# Patient Record
Sex: FEMALE | Race: WHITE | Employment: OTHER | URBAN - METROPOLITAN AREA
[De-identification: names, ages, dates, MRNs, and addresses within clinical notes are randomized per-mention and may not be internally consistent; named-entity substitution may affect disease eponyms.]

---

## 2017-01-12 ENCOUNTER — TRANSFERRED RECORDS (OUTPATIENT)
Dept: HEALTH INFORMATION MANAGEMENT | Facility: CLINIC | Age: 57
End: 2017-01-12

## 2017-06-17 ENCOUNTER — HEALTH MAINTENANCE LETTER (OUTPATIENT)
Age: 57
End: 2017-06-17

## 2018-06-23 ASSESSMENT — ENCOUNTER SYMPTOMS
WEAKNESS: 1
HEARTBURN: 1
CHILLS: 0
DIZZINESS: 0
ARTHRALGIAS: 1
CONSTIPATION: 0
HEMATURIA: 0
FEVER: 0
EYE PAIN: 0
HEADACHES: 0
ABDOMINAL PAIN: 1
NAUSEA: 0
MYALGIAS: 1
HEMATOCHEZIA: 0
DYSURIA: 0
FREQUENCY: 0
SHORTNESS OF BREATH: 0
SORE THROAT: 0
DIARRHEA: 0
NERVOUS/ANXIOUS: 0
JOINT SWELLING: 1
PARESTHESIAS: 0
PALPITATIONS: 0
COUGH: 0

## 2018-06-26 ENCOUNTER — RADIANT APPOINTMENT (OUTPATIENT)
Dept: MAMMOGRAPHY | Facility: CLINIC | Age: 58
End: 2018-06-26
Attending: PEDIATRICS
Payer: COMMERCIAL

## 2018-06-26 ENCOUNTER — OFFICE VISIT (OUTPATIENT)
Dept: PEDIATRICS | Facility: CLINIC | Age: 58
End: 2018-06-26
Payer: COMMERCIAL

## 2018-06-26 VITALS
BODY MASS INDEX: 38.27 KG/M2 | DIASTOLIC BLOOD PRESSURE: 72 MMHG | HEART RATE: 84 BPM | HEIGHT: 65 IN | WEIGHT: 229.7 LBS | TEMPERATURE: 97.6 F | OXYGEN SATURATION: 98 % | SYSTOLIC BLOOD PRESSURE: 103 MMHG

## 2018-06-26 DIAGNOSIS — M25.561 BILATERAL CHRONIC KNEE PAIN: ICD-10-CM

## 2018-06-26 DIAGNOSIS — G89.29 BILATERAL CHRONIC KNEE PAIN: ICD-10-CM

## 2018-06-26 DIAGNOSIS — R13.19 ESOPHAGEAL DYSPHAGIA: ICD-10-CM

## 2018-06-26 DIAGNOSIS — Z86.018 HISTORY OF DYSPLASTIC NEVUS: ICD-10-CM

## 2018-06-26 DIAGNOSIS — Z11.4 SCREENING FOR HIV (HUMAN IMMUNODEFICIENCY VIRUS): ICD-10-CM

## 2018-06-26 DIAGNOSIS — Z83.49 FAMILY HISTORY OF THYROID DISORDER: ICD-10-CM

## 2018-06-26 DIAGNOSIS — Z12.31 VISIT FOR SCREENING MAMMOGRAM: ICD-10-CM

## 2018-06-26 DIAGNOSIS — K44.9 HIATAL HERNIA: ICD-10-CM

## 2018-06-26 DIAGNOSIS — F43.9 STRESS: ICD-10-CM

## 2018-06-26 DIAGNOSIS — Z11.59 NEED FOR HEPATITIS C SCREENING TEST: ICD-10-CM

## 2018-06-26 DIAGNOSIS — Z82.61 FAMILY HISTORY OF RHEUMATOID ARTHRITIS: ICD-10-CM

## 2018-06-26 DIAGNOSIS — B37.2 CANDIDAL INTERTRIGO: ICD-10-CM

## 2018-06-26 DIAGNOSIS — Z83.3 FAMILY HISTORY OF DIABETES MELLITUS: ICD-10-CM

## 2018-06-26 DIAGNOSIS — M25.562 BILATERAL CHRONIC KNEE PAIN: ICD-10-CM

## 2018-06-26 DIAGNOSIS — R10.31 RLQ ABDOMINAL PAIN: ICD-10-CM

## 2018-06-26 DIAGNOSIS — E66.01 MORBID OBESITY (H): ICD-10-CM

## 2018-06-26 DIAGNOSIS — Z00.00 ROUTINE GENERAL MEDICAL EXAMINATION AT A HEALTH CARE FACILITY: Primary | ICD-10-CM

## 2018-06-26 LAB
ERYTHROCYTE [DISTWIDTH] IN BLOOD BY AUTOMATED COUNT: 13.7 % (ref 10–15)
ERYTHROCYTE [SEDIMENTATION RATE] IN BLOOD BY WESTERGREN METHOD: 28 MM/H (ref 0–30)
HBA1C MFR BLD: 5.7 % (ref 0–5.6)
HCT VFR BLD AUTO: 38 % (ref 35–47)
HGB BLD-MCNC: 11.9 G/DL (ref 11.7–15.7)
MCH RBC QN AUTO: 26.9 PG (ref 26.5–33)
MCHC RBC AUTO-ENTMCNC: 31.3 G/DL (ref 31.5–36.5)
MCV RBC AUTO: 86 FL (ref 78–100)
PLATELET # BLD AUTO: 397 10E9/L (ref 150–450)
RBC # BLD AUTO: 4.42 10E12/L (ref 3.8–5.2)
WBC # BLD AUTO: 10.7 10E9/L (ref 4–11)

## 2018-06-26 PROCEDURE — 77063 BREAST TOMOSYNTHESIS BI: CPT | Mod: TC

## 2018-06-26 PROCEDURE — 87389 HIV-1 AG W/HIV-1&-2 AB AG IA: CPT | Performed by: PEDIATRICS

## 2018-06-26 PROCEDURE — 99214 OFFICE O/P EST MOD 30 MIN: CPT | Mod: 25 | Performed by: PEDIATRICS

## 2018-06-26 PROCEDURE — 80061 LIPID PANEL: CPT | Performed by: PEDIATRICS

## 2018-06-26 PROCEDURE — 83036 HEMOGLOBIN GLYCOSYLATED A1C: CPT | Performed by: PEDIATRICS

## 2018-06-26 PROCEDURE — 99396 PREV VISIT EST AGE 40-64: CPT | Performed by: PEDIATRICS

## 2018-06-26 PROCEDURE — 86803 HEPATITIS C AB TEST: CPT | Performed by: PEDIATRICS

## 2018-06-26 PROCEDURE — 85652 RBC SED RATE AUTOMATED: CPT | Performed by: PEDIATRICS

## 2018-06-26 PROCEDURE — 86200 CCP ANTIBODY: CPT | Performed by: PEDIATRICS

## 2018-06-26 PROCEDURE — 36415 COLL VENOUS BLD VENIPUNCTURE: CPT | Performed by: PEDIATRICS

## 2018-06-26 PROCEDURE — 84443 ASSAY THYROID STIM HORMONE: CPT | Performed by: PEDIATRICS

## 2018-06-26 PROCEDURE — 86431 RHEUMATOID FACTOR QUANT: CPT | Performed by: PEDIATRICS

## 2018-06-26 PROCEDURE — 77067 SCR MAMMO BI INCL CAD: CPT | Mod: TC

## 2018-06-26 PROCEDURE — 85027 COMPLETE CBC AUTOMATED: CPT | Performed by: PEDIATRICS

## 2018-06-26 PROCEDURE — 80053 COMPREHEN METABOLIC PANEL: CPT | Performed by: PEDIATRICS

## 2018-06-26 RX ORDER — ECONAZOLE NITRATE 10 MG/G
CREAM TOPICAL DAILY
Qty: 85 G | Refills: 3 | Status: SHIPPED | OUTPATIENT
Start: 2018-06-26 | End: 2024-02-20

## 2018-06-26 RX ORDER — ECONAZOLE NITRATE 10 MG/G
CREAM TOPICAL DAILY
COMMUNITY
End: 2018-06-26

## 2018-06-26 ASSESSMENT — ENCOUNTER SYMPTOMS
JOINT SWELLING: 1
FEVER: 0
SHORTNESS OF BREATH: 0
WEAKNESS: 1
FREQUENCY: 0
MYALGIAS: 1
PARESTHESIAS: 0
SORE THROAT: 0
DYSURIA: 0
HEADACHES: 0
COUGH: 0
HEMATURIA: 0
ARTHRALGIAS: 1
EYE PAIN: 0
HEARTBURN: 1
ABDOMINAL PAIN: 1
NAUSEA: 0
PALPITATIONS: 0
DIARRHEA: 0
DIZZINESS: 0
HEMATOCHEZIA: 0
CONSTIPATION: 0
NERVOUS/ANXIOUS: 0
CHILLS: 0

## 2018-06-26 NOTE — PROGRESS NOTES
SUBJECTIVE:   CC: Berna Lee is an 58 year old woman who presents for preventive health visit.     Physical   Annual:     Getting at least 3 servings of Calcium per day:  NO    Bi-annual eye exam:  Yes    Dental care twice a year:  Yes    Sleep apnea or symptoms of sleep apnea:  Excessive snoring    Diet:  Regular (no restrictions) and Breakfast skipped    Frequency of exercise:  1 day/week    Duration of exercise:  15-30 minutes    Taking medications regularly:  Not Applicable    Additional concerns today:  YES (not getting enough of sleep, pain both knees, hip and shoulder pain, acid reflex, due to xrays from kidney stones, pt claims to have hiatal hernia, refill antifungal cream, pain in overy area, Pt  is concerned about her depression)          Today's PHQ-2 Score:   PHQ-2 ( 1999 [employer]) 6/23/2018   Q1: Little interest or pleasure in doing things 1   Q2: Feeling down, depressed or hopeless 1   PHQ-2 Score 2   Q1: Little interest or pleasure in doing things Several days   Q2: Feeling down, depressed or hopeless Several days   PHQ-2 Score 2       Abuse: Current or Past(Physical, Sexual or Emotional)- No  Do you feel safe in your environment - Yes    Social History   Substance Use Topics     Smoking status: Passive Smoke Exposure - Never Smoker     Smokeless tobacco: Never Used      Comment: Live with a smoker     Alcohol use 0.0 oz/week      Comment: occasional , social     Alcohol Use 6/23/2018   If you drink alcohol do you typically have greater than 3 drinks per day OR greater than 7 drinks per week? No   No flowsheet data found.    Reviewed orders with patient.  Reviewed health maintenance and updated orders accordingly - Yes    Patient over age 50, mutual decision to screen reflected in health maintenance.    Pertinent mammograms are reviewed under the imaging tab.  History of abnormal Pap smear: Status post benign hysterectomy. Health Maintenance and Surgical History updated.     Reviewed  and updated as needed this visit by clinical staff  Tobacco  Allergies  Meds  Med Hx  Surg Hx  Fam Hx  Soc Hx        Reviewed and updated as needed this visit by Provider    Concerns today:    Mood -  worried about depression - has been a very difficult year with taking care of her son who lives in [place] - he had been diagnosed with a brain tumor, then diagnosis changed to sarcoidosis.  Now, she is busy with planning her son's wedding - he is getting  in August.  She has had lower mood and increased stress, but denies thoughts of self harm and does not feel that she needs a medication.  Chronic pain has also caused her great frustrating and limited her ability to be active, compounding her mood symptoms.   Previously had good results with seeing a counselor after her  had a job change.    Knees started hurting in April - started as muscle pain - persistently painful and worsening since that time in the entire joint and behind the joint.  Left knee hurts more, but right knee also painful.  Gets some knee swelling.  Left ankle swells since she broke it.  Pain extends to ankle on the left side.   She has been limiting her activity due to pain and weight is climbing.   Strong family hx of RA and she is nervous about her current symptoms representing the onset of this issue.  Previously followed by [facility] for her ankle injury and she knows that she has had knee imaging performed there in the past.    Acid reflux - occasionally at night - worse if eats too late.  Hiatal hernia noted on past imaging.  Sometimes with eating food gets stuck and then is really painful.  This has been happening more often.  Food will get stuck in the esophagus with pain right under her sternum.  No blood in stools or black tarry stools.  Using TUMS intermittently.    Pain in ovary area - right side - had pain there with ovulation while she was menstruating and now pain comes on and lasts for several  "days. This has been happening chronically - worsening over time.  Denies associated abdominal bloating.    Hysterectomy when 36    Sweaty summer - yeast of infections of skin under breasts.   Has used several different products for this in the past and notes that econazole cream works the best.               Review of Systems   Constitutional: Negative for chills and fever.   HENT: Negative for congestion, ear pain, hearing loss and sore throat.    Eyes: Negative for pain and visual disturbance.   Respiratory: Negative for cough and shortness of breath.    Cardiovascular: Negative for chest pain, palpitations and peripheral edema.   Gastrointestinal: Positive for abdominal pain and heartburn. Negative for constipation, diarrhea, hematochezia and nausea.   Genitourinary: Negative for dysuria, frequency, genital sores, hematuria, pelvic pain, urgency, vaginal bleeding and vaginal discharge.   Musculoskeletal: Positive for arthralgias, joint swelling and myalgias.   Skin: Negative for rash.   Neurological: Positive for weakness. Negative for dizziness, headaches and paresthesias.   Psychiatric/Behavioral: Positive for mood changes. The patient is not nervous/anxious.           OBJECTIVE:   /72 (BP Location: Right arm, Patient Position: Chair, Cuff Size: Adult Large)  Pulse 84  Temp 97.6  F (36.4  C) (Tympanic)  Ht 5' 5\" (1.651 m)  Wt 229 lb 11.2 oz (104.2 kg)  SpO2 98%  BMI 38.22 kg/m2  Physical Exam  GENERAL: healthy, alert and no distress  EYES: Eyes grossly normal to inspection, PERRL and conjunctivae and sclerae normal  HENT: ear canals and TM's normal, nose and mouth without ulcers or lesions  NECK: no adenopathy, no asymmetry, masses, or scars and thyroid normal to palpation  RESP: lungs clear to auscultation - no rales, rhonchi or wheezes  BREAST: normal without masses, tenderness or nipple discharge and no palpable axillary masses or adenopathy  CV: regular rate and rhythm, normal S1 S2, no S3 or " S4, no murmur, click or rub, no peripheral edema and peripheral pulses strong  ABDOMEN: soft, nontender, no hepatosplenomegaly, no masses and bowel sounds normal  MS: bilateral knees with limited flexion, tenderness to palpation in left knee posterior to joint and along lateral joint line, no ballotable effusion present  SKIN: confluent erythematous patches with satellite lesions under breasts bilaterally - no open areas or breaks in skin  NEURO: Normal strength and tone, mentation intact and speech normal  PSYCH: mentation appears normal, affect normal/bright    Diagnostic Test Results:  pending    ASSESSMENT/PLAN:       ICD-10-CM    1. Routine general medical examination at a health care facility Z00.00 Hemoglobin A1c     TSH with free T4 reflex     Lipid panel reflex to direct LDL Fasting     Comprehensive metabolic panel   2. Bilateral chronic knee pain M25.561 ORTHOPEDICS ADULT REFERRAL    M25.562 Erythrocyte sedimentation rate auto    G89.29 Rheumatoid factor     Cyclic Citrullinated Peptide Antibody IgG     CBC with platelets    Recommended orthopedics evaluation promptly, additional lab work today to evaluate for underlying rheumatologic etiology, though most concerned that symptoms represent advanced OA   3. Esophageal dysphagia R13.10 GASTROENTEROLOGY ADULT REF PROCEDURE ONLY [facility]  [phone]; [facility]    Persistent episodes of food getting stuck in esophagus - needs endoscopy, possible esophageal stricture   4. Morbid obesity (H) E66.01 Hemoglobin A1c     TSH with free T4 reflex     Lipid panel reflex to direct LDL Fasting     Comprehensive metabolic panel    Discussed recommendations for lifestyle change   5. Hiatal hernia K44.9 GASTROENTEROLOGY ADULT REF PROCEDURE ONLY [facility]  [phone]; [facility]   6. RLQ abdominal pain R10.31 US Pelvic Complete with Transvaginal    Worsening of RLQ pain over time - recommended imaging to evaluate for ovarian abnormality   7. Candidal  "intertrigo B37.2 econazole nitrate 1 % cream   8. Family history of thyroid disorder Z83.49 TSH with free T4 reflex   9. Family history of rheumatoid arthritis Z82.61 Erythrocyte sedimentation rate auto     Rheumatoid factor     Cyclic Citrullinated Peptide Antibody IgG     CBC with platelets   10. Screening for HIV (human immunodeficiency virus) Z11.4 HIV Screening   11. History of dysplastic nevus Z86.018    12. Need for hepatitis C screening test Z11.59 **Hepatitis C Screen Reflex to RNA FUTURE anytime   13. Family history of diabetes mellitus Z83.3 Hemoglobin A1c   14. Visit for screening mammogram Z12.31 MA Screening Digital Bilateral   15.     Stress - F43.9 - discussed mood concerns at length - plan for patient to call to schedule follow up with a counselor who has been helpful for her in the past - patient feels comfortable watching these symptoms and working on self care/counseling for now - will return to see me if mood is worsening over time.    COUNSELING:  Special attention given to:        Regular exercise       Healthy diet/nutrition       Vision screening       Osteoporosis Prevention/Bone Health       Consider Hep C screening for patients born between 1945 and 1965       HIV screeninx in teen years, 1x in adult years, and at intervals if high risk    BP Readings from Last 1 Encounters:   18 103/72     Estimated body mass index is 38.22 kg/(m^2) as calculated from the following:    Height as of this encounter: 5' 5\" (1.651 m).    Weight as of this encounter: 229 lb 11.2 oz (104.2 kg).      Weight management plan: Discussed healthy diet and exercise guidelines and patient will follow up in 12 months in clinic to re-evaluate.     reports that she is a non-smoker but has been exposed to tobacco smoke. She has never used smokeless tobacco.      Counseling Resources:  ATP IV Guidelines  Pooled Cohorts Equation Calculator  Breast Cancer Risk Calculator  FRAX Risk Assessment  ICSI Preventive " Guidelines  Dietary Guidelines for Americans, 2010  USDA's MyPlate  ASA Prophylaxis  Lung CA Screening    Bing Fung MD  [facility]

## 2018-06-26 NOTE — PATIENT INSTRUCTIONS
Call for visit with [facility]    Expect a phone call to schedule an upper endoscopy    [facility] [phone] - call to set up a visit for a pelvic ultrasound    Lab work today    Antifungal cream refilled    Call for visit with your past counselor - let me know if you need orders.  Keep me posted about your mood and come back in if worsening please    Schedule your mammogram - orders are in - # to schedule [phone]              Preventive Health Recommendations  Female Ages 50 - 64    Yearly exam: See your health care provider every year in order to  o Review health changes.   o Discuss preventive care.    o Review your medicines if your doctor has prescribed any.      Get a Pap test every three years (unless you have an abnormal result and your provider advises testing more often).    If you get Pap tests with HPV test, you only need to test every 5 years, unless you have an abnormal result.     You do not need a Pap test if your uterus was removed (hysterectomy) and you have not had cancer.    You should be tested each year for STDs (sexually transmitted diseases) if you're at risk.     Have a mammogram every 1 to 2 years.    Have a colonoscopy at age 50, or have a yearly FIT test (stool test). These exams screen for colon cancer.      Have a cholesterol test every 5 years, or more often if advised.    Have a diabetes test (fasting glucose) every three years. If you are at risk for diabetes, you should have this test more often.     If you are at risk for osteoporosis (brittle bone disease), think about having a bone density scan (DEXA).    Shots: Get a flu shot each year. Get a tetanus shot every 10 years.    Nutrition:     Eat at least 5 servings of fruits and vegetables each day.    Eat whole-grain bread, whole-wheat pasta and brown rice instead of white grains and rice.    Get adequate Calcium and Vitamin D.     Lifestyle    Exercise at least 150 minutes a week (30  minutes a day, 5 days a week). This will help you control your weight and prevent disease.    Limit alcohol to one drink per day.    No smoking.     Wear sunscreen to prevent skin cancer.     See your dentist every six months for an exam and cleaning.    See your eye doctor every 1 to 2 years.

## 2018-06-26 NOTE — MR AVS SNAPSHOT
After Visit Summary   2018    Berna Lee    MRN: [id number]           Patient Information     Date Of Birth          1960        Visit Information        Provider Department      2018 10:40 AM Bing Fung MD [facility]        Today's Diagnoses     Routine general medical examination at a health care facility    -  1    Family history of thyroid disorder        Family history of diabetes mellitus        Visit for screening mammogram        Need for hepatitis C screening test        History of dysplastic nevus        Screening for HIV (human immunodeficiency virus)        Bilateral chronic knee pain        Esophageal dysphagia        Hiatal hernia        RLQ abdominal pain        Candidal intertrigo        Family history of rheumatoid arthritis          Care Instructions    Call for visit with [facility]    Expect a phone call to schedule an upper endoscopy    [facility] [phone] - call to set up a visit for a pelvic ultrasound    Lab work today    Antifungal cream refilled    Call for visit with your past counselor - let me know if you need orders.  Keep me posted about your mood and come back in if worsening please    Schedule your mammogram - orders are in - # to schedule [phone]              Preventive Health Recommendations  Female Ages 50 - 64    Yearly exam: See your health care provider every year in order to  o Review health changes.   o Discuss preventive care.    o Review your medicines if your doctor has prescribed any.      Get a Pap test every three years (unless you have an abnormal result and your provider advises testing more often).    If you get Pap tests with HPV test, you only need to test every 5 years, unless you have an abnormal result.     You do not need a Pap test if your uterus was removed (hysterectomy) and you have not had cancer.    You should be tested each year for STDs (sexually  transmitted diseases) if you're at risk.     Have a mammogram every 1 to 2 years.    Have a colonoscopy at age 50, or have a yearly FIT test (stool test). These exams screen for colon cancer.      Have a cholesterol test every 5 years, or more often if advised.    Have a diabetes test (fasting glucose) every three years. If you are at risk for diabetes, you should have this test more often.     If you are at risk for osteoporosis (brittle bone disease), think about having a bone density scan (DEXA).    Shots: Get a flu shot each year. Get a tetanus shot every 10 years.    Nutrition:     Eat at least 5 servings of fruits and vegetables each day.    Eat whole-grain bread, whole-wheat pasta and brown rice instead of white grains and rice.    Get adequate Calcium and Vitamin D.     Lifestyle    Exercise at least 150 minutes a week (30 minutes a day, 5 days a week). This will help you control your weight and prevent disease.    Limit alcohol to one drink per day.    No smoking.     Wear sunscreen to prevent skin cancer.     See your dentist every six months for an exam and cleaning.    See your eye doctor every 1 to 2 years.            Follow-ups after your visit        Additional Services     GASTROENTEROLOGY ADULT REF PROCEDURE ONLY [facility]ay [phone]; [facility]       Last Lab Result: Creatinine (mg/dL)       Date                     Value                 08/24/2015               0.71             ----------  Body mass index is 38.22 kg/(m^2).      Patient will be contacted to schedule procedure.     Please be aware that coverage of these services is subject to the terms and limitations of your health insurance plan.  Call member services at your health plan with any benefit or coverage questions.  Any procedures must be performed at a [place] facility OR coordinated by your clinic's referral office.    Please bring the following with you to your appointment:    (1) Any X-Rays, CTs or MRIs which have been  performed.  Contact the facility where they were done to arrange for  prior to your scheduled appointment.    (2) List of current medications   (3) This referral request   (4) Any documents/labs given to you for this referral            ORTHOPEDICS ADULT REFERRAL       Your provider has referred you to: [facility].      Please be aware that coverage of these services is subject to the terms and limitations of your health insurance plan.  Call member services at your health plan with any benefit or coverage questions.      Please bring the following to your appointment:    >>   Any x-rays, CTs or MRIs which have been performed.  Contact the facility where they were done to arrange for  prior to your scheduled appointment.    >>   List of current medications   >>   This referral request   >>   Any documents/labs given to you for this referral                  Future tests that were ordered for you today     Open Future Orders        Priority Expected Expires Ordered    US Pelvic Complete with Transvaginal Routine  6/26/2019 6/26/2018    *MA Screening Digital Bilateral Routine  6/26/2019 6/26/2018            Who to contact     If you have questions or need follow up information about today's clinic visit or your schedule please contact [facility] directly at [phone].  Normal or non-critical lab and imaging results will be communicated to you by MyChart, letter or phone within 4 business days after the clinic has received the results. If you do not hear from us within 7 days, please contact the clinic through MyChart or phone. If you have a critical or abnormal lab result, we will notify you by phone as soon as possible.  Submit refill requests through [employer] or call your pharmacy and they will forward the refill request to us. Please allow 3 business days for your refill to be completed.          Additional Information About Your Visit        MyChart Information     [employer] gives  "you secure access to your electronic health record. If you see a primary care provider, you can also send messages to your care team and make appointments. If you have questions, please call your primary care clinic.  If you do not have a primary care provider, please call [phone] and they will assist you.        Care EveryWhere ID     This is your Care EveryWhere ID. This could be used by other organizations to access your [facility] medical records  [id number]        Your Vitals Were     Pulse Temperature Height Pulse Oximetry BMI (Body Mass Index)       84 97.6  F (36.4  C) (Tympanic) 5' 5\" (1.651 m) 98% 38.22 kg/m2        Blood Pressure from Last 3 Encounters:   06/26/18 103/72   08/21/16 124/83   02/04/16 100/80    Weight from Last 3 Encounters:   06/26/18 229 lb 11.2 oz (104.2 kg)   08/21/16 230 lb (104.3 kg)   02/04/16 217 lb (98.4 kg)              We Performed the Following     **Hepatitis C Screen Reflex to RNA FUTURE anytime     CBC with platelets     Comprehensive metabolic panel     Cyclic Citrullinated Peptide Antibody IgG     Erythrocyte sedimentation rate auto     GASTROENTEROLOGY ADULT REF PROCEDURE ONLY [facility]  [phone]; [facility]     Hemoglobin A1c     HIV Screening     Lipid panel reflex to direct LDL Fasting     ORTHOPEDICS ADULT REFERRAL     Rheumatoid factor     TSH with free T4 reflex          Where to get your medicines      These medications were sent to [id number] - [place]SE [place]EL GUTIERRE[place] - [address]  [address], [place] [address]     Phone:  [phone]     econazole nitrate 1 % cream          Primary Care Provider Office Phone # Fax #    Bing Fung [phone] [address] [place] [address]        Equal Access to Services     PRIMITIVO CERVANTES AH: Kvng talberto Lucia, waaxda luqadaha, qaybta kaalmada mary, juliano recinos ah. So wac " [phone].    ATENCIÓN: Si kaylee arriaza, tiene a perez disposición servicios gratuitos de asistencia lingüística. Jeet al [phone].    We comply with applicable federal civil rights laws and [place] laws. We do not discriminate on the basis of race, color, national origin, age, disability, sex, sexual orientation, or gender identity.            Thank you!     Thank you for choosing [facility] [place]  for your care. Our goal is always to provide you with excellent care. Hearing back from our patients is one way we can continue to improve our services. Please take a few minutes to complete the written survey that you may receive in the mail after your visit with us. Thank you!             Your Updated Medication List - Protect others around you: Learn how to safely use, store and throw away your medicines at www.disposemymeds.org.          This list is accurate as of 6/26/18 11:38 AM.  Always use your most recent med list.                   Brand Name Dispense Instructions for use Diagnosis    ALLEGRA ALLERGY PO      Take 180 mg by mouth daily        BREATHE RIGHT           econazole nitrate 1 % cream     85 g    Apply topically daily    Candidal intertrigo       MUCINEX DM PO      Take by mouth At Bedtime        PROBIOTIC PRODUCT PO      Take by mouth daily

## 2018-06-27 LAB
ALBUMIN SERPL-MCNC: 3.7 G/DL (ref 3.4–5)
ALP SERPL-CCNC: 117 U/L (ref 40–150)
ALT SERPL W P-5'-P-CCNC: 16 U/L (ref 0–50)
ANION GAP SERPL CALCULATED.3IONS-SCNC: 8 MMOL/L (ref 3–14)
AST SERPL W P-5'-P-CCNC: 11 U/L (ref 0–45)
BILIRUB SERPL-MCNC: 0.4 MG/DL (ref 0.2–1.3)
BUN SERPL-MCNC: 21 MG/DL (ref 7–30)
CALCIUM SERPL-MCNC: 9.1 MG/DL (ref 8.5–10.1)
CHLORIDE SERPL-SCNC: 105 MMOL/L (ref 94–109)
CHOLEST SERPL-MCNC: 214 MG/DL
CO2 SERPL-SCNC: 26 MMOL/L (ref 20–32)
CREAT SERPL-MCNC: 0.68 MG/DL (ref 0.52–1.04)
GFR SERPL CREATININE-BSD FRML MDRD: 88 ML/MIN/1.7M2
GLUCOSE SERPL-MCNC: 98 MG/DL (ref 70–99)
HCV AB SERPL QL IA: NONREACTIVE
HDLC SERPL-MCNC: 84 MG/DL
HIV 1+2 AB+HIV1 P24 AG SERPL QL IA: NONREACTIVE
LDLC SERPL CALC-MCNC: 109 MG/DL
NONHDLC SERPL-MCNC: 130 MG/DL
POTASSIUM SERPL-SCNC: 3.9 MMOL/L (ref 3.4–5.3)
PROT SERPL-MCNC: 7.4 G/DL (ref 6.8–8.8)
RHEUMATOID FACT SER NEPH-ACNC: <20 IU/ML (ref 0–20)
SODIUM SERPL-SCNC: 139 MMOL/L (ref 133–144)
TRIGL SERPL-MCNC: 105 MG/DL
TSH SERPL DL<=0.005 MIU/L-ACNC: 2.03 MU/L (ref 0.4–4)

## 2018-06-28 LAB — CCP AB SER IA-ACNC: 1 U/ML

## 2018-07-09 ENCOUNTER — TRANSFERRED RECORDS (OUTPATIENT)
Dept: HEALTH INFORMATION MANAGEMENT | Facility: CLINIC | Age: 58
End: 2018-07-09

## 2019-06-09 ENCOUNTER — APPOINTMENT (OUTPATIENT)
Dept: CT IMAGING | Facility: CLINIC | Age: 59
End: 2019-06-09
Payer: COMMERCIAL

## 2019-06-09 ENCOUNTER — HOSPITAL ENCOUNTER (EMERGENCY)
Facility: CLINIC | Age: 59
Discharge: HOME | End: 2019-06-09
Admitting: PHYSICIAN ASSISTANT
Payer: COMMERCIAL

## 2019-06-09 VITALS
HEART RATE: 76 BPM | SYSTOLIC BLOOD PRESSURE: 134 MMHG | RESPIRATION RATE: 20 BRPM | DIASTOLIC BLOOD PRESSURE: 78 MMHG | TEMPERATURE: 96.9 F | OXYGEN SATURATION: 98 %

## 2019-06-09 DIAGNOSIS — N20.1 URETEROLITHIASIS: ICD-10-CM

## 2019-06-09 DIAGNOSIS — N13.30 HYDRONEPHROSIS: ICD-10-CM

## 2019-06-09 DIAGNOSIS — K44.9 HIATAL HERNIA: ICD-10-CM

## 2019-06-09 LAB
ALBUMIN SERPL-MCNC: 3.5 G/DL (ref 3.4–5)
ALBUMIN UR-MCNC: 30 MG/DL
ALP SERPL-CCNC: 119 U/L (ref 40–150)
ALT SERPL W P-5'-P-CCNC: 20 U/L (ref 0–50)
ANION GAP SERPL CALCULATED.3IONS-SCNC: 5 MMOL/L (ref 3–14)
APPEARANCE UR: ABNORMAL
AST SERPL W P-5'-P-CCNC: 14 U/L (ref 0–45)
BASOPHILS # BLD AUTO: 0.1 10E9/L (ref 0–0.2)
BASOPHILS NFR BLD AUTO: 0.7 %
BILIRUB SERPL-MCNC: 0.3 MG/DL (ref 0.2–1.3)
BILIRUB UR QL STRIP: NEGATIVE
BUN SERPL-MCNC: 17 MG/DL (ref 7–30)
CALCIUM SERPL-MCNC: 8.5 MG/DL (ref 8.5–10.1)
CHLORIDE SERPL-SCNC: 109 MMOL/L (ref 94–109)
CO2 SERPL-SCNC: 28 MMOL/L (ref 20–32)
COLOR UR AUTO: YELLOW
CREAT SERPL-MCNC: 0.86 MG/DL (ref 0.52–1.04)
DIFFERENTIAL METHOD BLD: ABNORMAL
EOSINOPHIL # BLD AUTO: 0.3 10E9/L (ref 0–0.7)
EOSINOPHIL NFR BLD AUTO: 2.3 %
ERYTHROCYTE [DISTWIDTH] IN BLOOD BY AUTOMATED COUNT: 16.2 % (ref 10–15)
GFR SERPL CREATININE-BSD FRML MDRD: 73 ML/MIN/{1.73_M2}
GLUCOSE SERPL-MCNC: 126 MG/DL (ref 70–99)
GLUCOSE UR STRIP-MCNC: NEGATIVE MG/DL
HCT VFR BLD AUTO: 32.8 % (ref 35–47)
HGB BLD-MCNC: 9.5 G/DL (ref 11.7–15.7)
HGB UR QL STRIP: ABNORMAL
IMM GRANULOCYTES # BLD: 0.1 10E9/L (ref 0–0.4)
IMM GRANULOCYTES NFR BLD: 0.8 %
KETONES UR STRIP-MCNC: NEGATIVE MG/DL
LEUKOCYTE ESTERASE UR QL STRIP: NEGATIVE
LIPASE SERPL-CCNC: 89 U/L (ref 73–393)
LYMPHOCYTES # BLD AUTO: 2 10E9/L (ref 0.8–5.3)
LYMPHOCYTES NFR BLD AUTO: 17.5 %
MCH RBC QN AUTO: 22.9 PG (ref 26.5–33)
MCHC RBC AUTO-ENTMCNC: 29 G/DL (ref 31.5–36.5)
MCV RBC AUTO: 79 FL (ref 78–100)
MONOCYTES # BLD AUTO: 1 10E9/L (ref 0–1.3)
MONOCYTES NFR BLD AUTO: 8.4 %
MUCOUS THREADS #/AREA URNS LPF: PRESENT /LPF
NEUTROPHILS # BLD AUTO: 8.2 10E9/L (ref 1.6–8.3)
NEUTROPHILS NFR BLD AUTO: 70.3 %
NITRATE UR QL: NEGATIVE
NRBC # BLD AUTO: 0 10*3/UL
NRBC BLD AUTO-RTO: 0 /100
PH UR STRIP: 5.5 PH (ref 5–7)
PLATELET # BLD AUTO: 454 10E9/L (ref 150–450)
POTASSIUM SERPL-SCNC: 4.1 MMOL/L (ref 3.4–5.3)
PROT SERPL-MCNC: 7.4 G/DL (ref 6.8–8.8)
RBC # BLD AUTO: 4.15 10E12/L (ref 3.8–5.2)
RBC #/AREA URNS AUTO: >182 /HPF (ref 0–2)
SODIUM SERPL-SCNC: 142 MMOL/L (ref 133–144)
SOURCE: ABNORMAL
SP GR UR STRIP: 1.02 (ref 1–1.03)
SQUAMOUS #/AREA URNS AUTO: 1 /HPF (ref 0–1)
UROBILINOGEN UR STRIP-MCNC: NORMAL MG/DL (ref 0–2)
WBC # BLD AUTO: 11.7 10E9/L (ref 4–11)
WBC #/AREA URNS AUTO: <1 /HPF (ref 0–5)
YEAST #/AREA URNS HPF: ABNORMAL /HPF

## 2019-06-09 PROCEDURE — 83690 ASSAY OF LIPASE: CPT | Performed by: PHYSICIAN ASSISTANT

## 2019-06-09 PROCEDURE — 85025 COMPLETE CBC W/AUTO DIFF WBC: CPT | Performed by: PHYSICIAN ASSISTANT

## 2019-06-09 PROCEDURE — 96361 HYDRATE IV INFUSION ADD-ON: CPT

## 2019-06-09 PROCEDURE — 99284 EMERGENCY DEPT VISIT MOD MDM: CPT | Mod: 25

## 2019-06-09 PROCEDURE — 81001 URINALYSIS AUTO W/SCOPE: CPT | Performed by: PHYSICIAN ASSISTANT

## 2019-06-09 PROCEDURE — 80053 COMPREHEN METABOLIC PANEL: CPT | Performed by: PHYSICIAN ASSISTANT

## 2019-06-09 PROCEDURE — 96374 THER/PROPH/DIAG INJ IV PUSH: CPT

## 2019-06-09 PROCEDURE — 96375 TX/PRO/DX INJ NEW DRUG ADDON: CPT

## 2019-06-09 PROCEDURE — 25000128 H RX IP 250 OP 636: Performed by: PHYSICIAN ASSISTANT

## 2019-06-09 PROCEDURE — 74176 CT ABD & PELVIS W/O CONTRAST: CPT

## 2019-06-09 RX ORDER — OXYCODONE HYDROCHLORIDE 5 MG/1
5 TABLET ORAL EVERY 6 HOURS PRN
Qty: 12 TABLET | Refills: 0 | Status: ON HOLD | OUTPATIENT
Start: 2019-06-09 | End: 2019-06-15

## 2019-06-09 RX ORDER — ONDANSETRON 2 MG/ML
4 INJECTION INTRAMUSCULAR; INTRAVENOUS EVERY 30 MIN PRN
Status: DISCONTINUED | OUTPATIENT
Start: 2019-06-09 | End: 2019-06-09 | Stop reason: HOSPADM

## 2019-06-09 RX ORDER — TAMSULOSIN HYDROCHLORIDE 0.4 MG/1
0.4 CAPSULE ORAL DAILY
Qty: 10 CAPSULE | Refills: 0 | Status: ON HOLD | OUTPATIENT
Start: 2019-06-09 | End: 2019-06-15

## 2019-06-09 RX ORDER — KETOROLAC TROMETHAMINE 15 MG/ML
15 INJECTION, SOLUTION INTRAMUSCULAR; INTRAVENOUS ONCE
Status: COMPLETED | OUTPATIENT
Start: 2019-06-09 | End: 2019-06-09

## 2019-06-09 RX ORDER — ONDANSETRON 4 MG/1
4 TABLET, ORALLY DISINTEGRATING ORAL EVERY 8 HOURS PRN
Qty: 10 TABLET | Refills: 0 | Status: ON HOLD | OUTPATIENT
Start: 2019-06-09 | End: 2019-06-15

## 2019-06-09 RX ORDER — SODIUM CHLORIDE 9 MG/ML
1000 INJECTION, SOLUTION INTRAVENOUS CONTINUOUS
Status: DISCONTINUED | OUTPATIENT
Start: 2019-06-09 | End: 2019-06-09 | Stop reason: HOSPADM

## 2019-06-09 RX ADMIN — KETOROLAC TROMETHAMINE 15 MG: 15 INJECTION, SOLUTION INTRAMUSCULAR; INTRAVENOUS at 15:59

## 2019-06-09 RX ADMIN — SODIUM CHLORIDE 1000 ML: 9 INJECTION, SOLUTION INTRAVENOUS at 16:00

## 2019-06-09 RX ADMIN — ONDANSETRON 4 MG: 2 INJECTION INTRAMUSCULAR; INTRAVENOUS at 15:57

## 2019-06-09 ASSESSMENT — ENCOUNTER SYMPTOMS
HEMATURIA: 0
FLANK PAIN: 1
ABDOMINAL PAIN: 1
DIARRHEA: 0
FEVER: 0
NAUSEA: 1
DYSURIA: 0
BLOOD IN STOOL: 0
VOMITING: 1
FREQUENCY: 0

## 2019-06-09 NOTE — ED AVS SNAPSHOT
[facility] Emergency Department  [address]  [facility] [address]  Phone:  [phone]  Fax:  [phone]                                    Berna Lee   MRN: [id number]    Department:  [facility] Emergency Department   Date of Visit:  6/9/2019           After Visit Summary Signature Page    I have received my discharge instructions, and my questions have been answered. I have discussed any challenges I see with this plan with the nurse or doctor.    ..........................................................................................................................................  Patient/Patient Representative Signature      ..........................................................................................................................................  Patient Representative Print Name and Relationship to Patient    ..................................................               ................................................  Date                                   Time    ..........................................................................................................................................  Reviewed by Signature/Title    ...................................................              ..............................................  Date                                               Time          22EPIC Rev 08/18

## 2019-06-09 NOTE — DISCHARGE INSTRUCTIONS
Tylenol and ibuprofen at home for pain control. If pain is not controlled, take oxycodone. Zofran for nausea. Begin taking the flomax. Strain your urine at home. Dr. Samson is the urologist that you have seen in the past, his information is above. Call if not seeing any improvement in 3-5 days. Return for changing or worsening symptoms, uncontrolled nausea or vomiting, fevers, worsening/uncontrolled pain, new concerns.       Discharge Instructions  Kidney Stones    Kidney stones are a common problem that can cause a lot of pain but fortunately are usually not dangerous and can be generally treated with medicine at home.  However, sometimes your condition may be worse than it seemed at first, or may get worse with time.     You need to follow-up with your regular doctor within 3 days.    Most kidney stones will pass on their own, but occasionally stones may need to be removed by an urologist. We will send you home with a urine strainer. Be sure to urinate into this, or urinate into a container and pour the urine through the fine filter to catch the kidney stone as it comes out. The stone will seem like a pebble or grain of sand. Be sure to save this in a Ziploc  bag and take it to the doctor?s office with you.       Return to the Emergency Department if:  Your pain is not controlled.  You are vomiting and can?t keep fluids or medications down.  You develop fever (>101).  You feel much more ill or develop new symptoms.  What can I do to help myself?  Be sure to drink plenty of fluids.  Staying active is good, and may help the stone to pass. You may do whatever you feel up to doing without restrictions.   Treatment:  Non-steroidal anti-inflammatory drugs (NSAIDs). This includes prescription medicines like Toradol  (ketorolac) and non-prescription medicines like Advil  (ibuprofen) and Nuprin  (ibuprofen). These pain relievers are very effective for kidney stones.  Narcotic pain pills. If you have been given a narcotic  such as Vicodin  (hydrocodone with acetaminophen), Percocet  (oxycodone with acetaminophen), or codeine, do not drive for four hours after you have taken it. If the narcotic contains Tylenol  (acetaminophen), do not take Tylenol  with it. All narcotics will cause constipation, so eat a high fiber diet.    Nausea medication.  Nausea and vomiting are common with kidney stones, so your physician may send you home with medicine for this.   Flomax  (tamsulosin). This medicine is sometimes used for men with prostate problems, but also can help kidney stones to pass. This medicine can lower blood pressure, and you may feel faint, especially when you first stand up. Be sure to get up gradually, sit down if you feel faint, and avoid activity where feeling faint would be dangerous, such as climbing ladders.   If you were given a prescription for medicine here today, be sure to read all of the information (including the package insert) that comes with your prescription.  This will include important information about the medicine, its side effects, and any warnings that you need to know about.  The pharmacist who fills the prescription can provide more information and answer questions you may have about the medicine.  If you have questions or concerns that the pharmacist cannot address, please call or return to the Emergency Department.   Opioid Medication Information    Pain medications are among the most commonly prescribed medicines, so we are including this information for all our patients. If you did not receive pain medication or get a prescription for pain medicine, you can ignore it.     You may have been given a prescription for an opioid (narcotic) pain medicine and/or have received a pain medicine while here in the Emergency Department. These medicines can make you drowsy or impaired. You must not drive, operate dangerous equipment, or engage in any other dangerous activities while taking these medications. If you  drive while taking these medications, you could be arrested for DUI, or driving under the influence. Do not drink any alcohol while you are taking these medications.     Opioid pain medications can cause addiction. If you have a history of chemical dependency of any type, you are at a higher risk of becoming addicted to pain medications.  Only take these prescribed medications to treat your pain when all other options have been tried. Take it for as short a time and as few doses as possible. Store your pain pills in a secure place, as they are frequently stolen and provide a dangerous opportunity for children or visitors in your house to start abusing these powerful medications. We will not replace any lost or stolen medicine.  As soon as your pain is better, you should flush all your remaining medication.     Many prescription pain medications contain Tylenol  (acetaminophen), including Vicodin , Tylenol #3 , Norco , Lortab , and Percocet .  You should not take any extra pills of Tylenol  if you are using these prescription medications or you can get very sick.  Do not ever take more than 3000 mg of acetaminophen in any 24 hour period.    All opioids tend to cause constipation. Drink plenty of water and eat foods that have a lot of fiber, such as fruits, vegetables, prune juice, apple juice and high fiber cereal.  Take a laxative if you don?t move your bowels at least every other day. Miralax , Milk of Magnesia, Colace , or Senna  can be used to keep you regular.      Remember that you can always come back to the Emergency Department if you are not able to see your regular doctor in the amount of time listed above, if you get any new symptoms, or if there is anything that worries you.

## 2019-06-09 NOTE — ED PROVIDER NOTES
History     Chief Complaint:  Flank Pain and Abdominal Pain    HPI   Berna Lee is a 59 year old female with a history of kidney stones and lithotripsy who presents to the emergency department today with flank pain and abdominal pain. Patient woke up at 0730 this morning with left flank pain that radiates to the left abdomen. She went back to bed until 1330 (2 hours ago), at which point she woke up with associated nausea and vomiting. The patient usually gets vomiting with her kidney stones. Patient rates her pain as 8/10 and states this is greatly increased from her previous kidney stone pain. She took Zofran, but was not able to keep it down. Patient denies diarrhea, fever, urinary changes, hematuria, blood in her stools.  She does have an urologist, Dr. Samson, who was performed lithotripsy for her stones in the past.     Allergies:  Augmentin  Codeine Sulfate  Ivp Dye [Contrast Dye]  Sulfa Drugs  Tetracyclines    Medications:    Mucinex  Allegra allergy     Past Medical History:    Allergic state   Depressive disorder   Blood transfusion   Dysplastic nevus  Kidney stones   Nephrolithiasis  Obesity  PONV (postoperative nausea and vomiting)     Past Surgical History:    Abdomen Surgery   Biopsy   Colonoscopy   Combined Cystoscopy, Retrogrades, Ureteroscopy, Laser Holmium Lithotripsy Ureter(S), Insert Stent   ENT Surgery   Genitourinary Surgery   [place] Tooth Surgery   Hysterectomy, Pap No Longer Indicated   Orthopedic Surgery   Soft Tissue Surgery     Family History:    Thyroid disorder      Rheumatiod Arthritis/HypoThyroid  Anxiety Disorder  Arthritis  Cerebrovascular Disease  Coronary Artery Disease  Depression  Diabetes  Gastrointestinal Disease  Genitourinary Problems  Hyperlipidemia  Hypertension  Lipids  Neurologic Disorder  Obesity  Osteoporosis  Thyroid Disease    Social History:  The patient was accompanied to the ED by .  Smoking Status: Passive smoke exposure, never smoker  Smokeless  Tobacco: Never  Alcohol Use: Yes   Marital Status:       Review of Systems   Constitutional: Negative for fever.   Gastrointestinal: Positive for abdominal pain (left), nausea and vomiting. Negative for blood in stool and diarrhea.   Genitourinary: Positive for flank pain (left). Negative for dysuria, frequency and hematuria.   All other systems reviewed and are negative.    Physical Exam     Patient Vitals for the past 24 hrs:   BP Temp Temp src Pulse Resp SpO2   06/09/19 1630 160/74 -- -- 76 -- 99 %   06/09/19 1600 168/86 -- -- 79 -- 98 %   06/09/19 1532 (!) 199/113 96.9  F (36.1  C) Temporal 97 20 99 %      Physical Exam  General: Resting in chair with occasional emesis, appears uncomfortable. Mildly diaphoretic.   Skin: Good turgor, no rash, no unusual bruising or prominent lesions.  HEENT: Head: Normocephalic, atraumatic, no visible masses.   Eyes: Conjunctiva clear.  Cardiac: Normal rate and regular rhythm, no murmur or gallop.   Lungs: Clear to auscultation.  Abdomen: Left CVA tenderness.   Musculoskeletal: Normal gait and station. No calf tenderness or swelling.   Neurologic: Oriented x 3. GCS: 15.  Psychiatric: Intact recent and remote memory, judgment and insight, normal mood and affect.    Emergency Department Course   Imaging:  Radiology findings were communicated with the patient and family who voiced understanding of the findings.  CT Abdomen Pelvis w/o Contrast   Final Result   IMPRESSION:      1. 5 mm stone in the proximal left ureter with mild left   hydronephrosis.   2. Additional nonobstructing 8 mm stone in the upper left kidney.   3. Moderate to large hiatal hernia.      ZO RICHARD MD      Report per radiology      Laboratory:  Laboratory findings were communicated with the patient and family who voiced understanding of the findings.  CBC: AWNL (WBC 11.7, HGB 9.5, )   CMP: 126 Glucose o/w WNL (Creatinine 0.86)   Lipase: 89   UA: slightly cloudy, yellow urine with large blood, 30  protein albumin, >182 RBC, many yeast, mucous present o/w WNL     Interventions:  1557: Zofran 4mg IV   1559: Toradol 15mg IV injection  1600:  0.9% NaCl 1L IV Bolus     Emergency Department Course:  Nursing notes and vitals reviewed.  1521: I performed an exam of the patient as documented above.   IV was inserted and blood was drawn for laboratory testing, results above.  The patient provided a urine sample here in the emergency department. This was sent for laboratory testing, findings above.  The patient was sent for a CT Abdomen Pelvis while in the emergency department, results above.   1638: Patient rechecked and updated.    1646: Findings and plan explained to the Patient and spouse. Patient discharged home with instructions regarding supportive care, medications, and reasons to return. The importance of close follow-up was reviewed. The patient was prescribed Zofran, Roxicodone, and Flomax.    I personally reviewed the laboratory and imaging results with the Patient and spouse and answered all related questions prior to discharge.     Impression & Plan    Medical Decision Making:  Berna Lee is a 59 year old female who presented with unilateral flank and abdominal pain consistent with renal colic. CT confirms a ureteral stone.  Obtaining CT was discussed to the patient kidney stones.  However, due to her significant pain, and ill appearance, it was pain is controlled with interventions in the Emergency Department. There is no fever or evidence of a urinary tract infection.  The patient will be discharged with opioid analgesics and Ibuprofen for pain. Side effects of narcotic medications were discussed. Flomax will be prescribed daily to attempt to ease stone passage.  Other etiologies for these symptoms such as AAA, pyelonephritis, muscular strain, appendicitis, amongst others are considered and have been excluded.  She also had a hiatal hernia on CT, patient has known of this for a great deal of  time.  She will follow with urology if not improving in the next week.  The patient was told to return if if increasing pain not controlled with pain meds, vomiting, and fever. All questions were answered prior to the patient's discharge. She was in agreement with the plan stated above.     Diagnosis:    ICD-10-CM    1. Ureterolithiasis N20.1    2. Hydronephrosis N13.30    3. Hiatal hernia K44.9        Disposition:  discharged to home    Discharge Medications:     Medication List      Started    ondansetron 4 MG ODT tab  Commonly known as:  ZOFRAN ODT  4 mg, Oral, EVERY 8 HOURS PRN     oxyCODONE 5 MG tablet  Commonly known as:  ROXICODONE  5 mg, Oral, EVERY 6 HOURS PRN     tamsulosin 0.4 MG capsule  Commonly known as:  FLOMAX  0.4 mg, Oral, DAILY            Scribe Disclosure:  SEBASTIEN Ellijillian Bell, am serving as a scribe at 5:00 PM on 6/9/2019 to document services personally performed by Jennifer Hutchins PA-C based on my observations and the provider's statements to me.    6/9/2019   [facility] EMERGENCY DEPARTMENT    This was created at least in part with a voice recognition software. Mistakes/typos may be present.      Jennifer Hutchins PA  06/09/19 [id number]

## 2019-06-09 NOTE — ED TRIAGE NOTES
Left flank pain radiates to left abdominal pain 7 :30 this morning. Nausea and vomiting. Patient states she has a history of kidney stones.   ABC intact alert and no distress.

## 2019-06-14 ENCOUNTER — APPOINTMENT (OUTPATIENT)
Dept: GENERAL RADIOLOGY | Facility: CLINIC | Age: 59
End: 2019-06-14
Attending: EMERGENCY MEDICINE
Payer: COMMERCIAL

## 2019-06-14 ENCOUNTER — APPOINTMENT (OUTPATIENT)
Dept: CT IMAGING | Facility: CLINIC | Age: 59
End: 2019-06-14
Attending: EMERGENCY MEDICINE
Payer: COMMERCIAL

## 2019-06-14 ENCOUNTER — HOSPITAL ENCOUNTER (OUTPATIENT)
Facility: CLINIC | Age: 59
Setting detail: OBSERVATION
Discharge: HOME | End: 2019-06-15
Attending: EMERGENCY MEDICINE | Admitting: INTERNAL MEDICINE
Payer: COMMERCIAL

## 2019-06-14 DIAGNOSIS — Z91.89 AT RISK FOR INADEQUATE PAIN CONTROL: ICD-10-CM

## 2019-06-14 DIAGNOSIS — N23 URETERAL COLIC: ICD-10-CM

## 2019-06-14 PROBLEM — N20.1 URETEROLITHIASIS: Status: ACTIVE | Noted: 2019-06-14

## 2019-06-14 LAB
ALBUMIN UR-MCNC: NEGATIVE MG/DL
ANION GAP SERPL CALCULATED.3IONS-SCNC: 6 MMOL/L (ref 3–14)
APPEARANCE UR: CLEAR
BILIRUB UR QL STRIP: NEGATIVE
BUN SERPL-MCNC: 16 MG/DL (ref 7–30)
CALCIUM SERPL-MCNC: 8.8 MG/DL (ref 8.5–10.1)
CHLORIDE SERPL-SCNC: 104 MMOL/L (ref 94–109)
CO2 SERPL-SCNC: 28 MMOL/L (ref 20–32)
COLOR UR AUTO: ABNORMAL
CREAT SERPL-MCNC: 0.68 MG/DL (ref 0.52–1.04)
ERYTHROCYTE [DISTWIDTH] IN BLOOD BY AUTOMATED COUNT: 16 % (ref 10–15)
GFR SERPL CREATININE-BSD FRML MDRD: >90 ML/MIN/{1.73_M2}
GLUCOSE SERPL-MCNC: 121 MG/DL (ref 70–99)
GLUCOSE UR STRIP-MCNC: NEGATIVE MG/DL
HCT VFR BLD AUTO: 31.8 % (ref 35–47)
HGB BLD-MCNC: 9.3 G/DL (ref 11.7–15.7)
HGB UR QL STRIP: NEGATIVE
KETONES UR STRIP-MCNC: ABNORMAL MG/DL
LEUKOCYTE ESTERASE UR QL STRIP: NEGATIVE
MCH RBC QN AUTO: 22.6 PG (ref 26.5–33)
MCHC RBC AUTO-ENTMCNC: 29.2 G/DL (ref 31.5–36.5)
MCV RBC AUTO: 77 FL (ref 78–100)
MUCOUS THREADS #/AREA URNS LPF: PRESENT /LPF
NITRATE UR QL: NEGATIVE
PH UR STRIP: 7.5 PH (ref 5–7)
PLATELET # BLD AUTO: 450 10E9/L (ref 150–450)
POTASSIUM SERPL-SCNC: 4.3 MMOL/L (ref 3.4–5.3)
RBC # BLD AUTO: 4.11 10E12/L (ref 3.8–5.2)
RBC #/AREA URNS AUTO: 4 /HPF (ref 0–2)
SODIUM SERPL-SCNC: 139 MMOL/L (ref 133–144)
SOURCE: ABNORMAL
SP GR UR STRIP: 1.02 (ref 1–1.03)
SQUAMOUS #/AREA URNS AUTO: 1 /HPF (ref 0–1)
UROBILINOGEN UR STRIP-MCNC: NORMAL MG/DL (ref 0–2)
WBC # BLD AUTO: 13.6 10E9/L (ref 4–11)
WBC #/AREA URNS AUTO: 1 /HPF (ref 0–5)

## 2019-06-14 PROCEDURE — 96361 HYDRATE IV INFUSION ADD-ON: CPT

## 2019-06-14 PROCEDURE — 25800030 ZZH RX IP 258 OP 636: Performed by: INTERNAL MEDICINE

## 2019-06-14 PROCEDURE — 96375 TX/PRO/DX INJ NEW DRUG ADDON: CPT

## 2019-06-14 PROCEDURE — 81001 URINALYSIS AUTO W/SCOPE: CPT | Performed by: EMERGENCY MEDICINE

## 2019-06-14 PROCEDURE — 96376 TX/PRO/DX INJ SAME DRUG ADON: CPT

## 2019-06-14 PROCEDURE — 85027 COMPLETE CBC AUTOMATED: CPT | Performed by: EMERGENCY MEDICINE

## 2019-06-14 PROCEDURE — G0378 HOSPITAL OBSERVATION PER HR: HCPCS

## 2019-06-14 PROCEDURE — 25000132 ZZH RX MED GY IP 250 OP 250 PS 637: Performed by: EMERGENCY MEDICINE

## 2019-06-14 PROCEDURE — 25000128 H RX IP 250 OP 636: Performed by: EMERGENCY MEDICINE

## 2019-06-14 PROCEDURE — 80048 BASIC METABOLIC PNL TOTAL CA: CPT | Performed by: EMERGENCY MEDICINE

## 2019-06-14 PROCEDURE — 96374 THER/PROPH/DIAG INJ IV PUSH: CPT

## 2019-06-14 PROCEDURE — 99219 ZZC INITIAL OBSERVATION CARE,LEVL II: CPT | Performed by: INTERNAL MEDICINE

## 2019-06-14 PROCEDURE — 87086 URINE CULTURE/COLONY COUNT: CPT | Performed by: EMERGENCY MEDICINE

## 2019-06-14 PROCEDURE — 25000132 ZZH RX MED GY IP 250 OP 250 PS 637: Performed by: INTERNAL MEDICINE

## 2019-06-14 PROCEDURE — 99285 EMERGENCY DEPT VISIT HI MDM: CPT | Mod: 25

## 2019-06-14 PROCEDURE — 25000128 H RX IP 250 OP 636: Performed by: INTERNAL MEDICINE

## 2019-06-14 PROCEDURE — 74176 CT ABD & PELVIS W/O CONTRAST: CPT

## 2019-06-14 PROCEDURE — 74019 RADEX ABDOMEN 2 VIEWS: CPT

## 2019-06-14 RX ORDER — NALOXONE HYDROCHLORIDE 0.4 MG/ML
.1-.4 INJECTION, SOLUTION INTRAMUSCULAR; INTRAVENOUS; SUBCUTANEOUS
Status: DISCONTINUED | OUTPATIENT
Start: 2019-06-14 | End: 2019-06-15 | Stop reason: HOSPADM

## 2019-06-14 RX ORDER — LORAZEPAM 2 MG/ML
0.5 INJECTION INTRAMUSCULAR EVERY 4 HOURS PRN
Status: DISCONTINUED | OUTPATIENT
Start: 2019-06-14 | End: 2019-06-15 | Stop reason: HOSPADM

## 2019-06-14 RX ORDER — OXYCODONE HYDROCHLORIDE 5 MG/1
5 TABLET ORAL ONCE
Status: COMPLETED | OUTPATIENT
Start: 2019-06-14 | End: 2019-06-14

## 2019-06-14 RX ORDER — AMOXICILLIN 250 MG
1 CAPSULE ORAL 2 TIMES DAILY
Status: DISCONTINUED | OUTPATIENT
Start: 2019-06-14 | End: 2019-06-15 | Stop reason: HOSPADM

## 2019-06-14 RX ORDER — BISACODYL 10 MG
10 SUPPOSITORY, RECTAL RECTAL DAILY PRN
Status: DISCONTINUED | OUTPATIENT
Start: 2019-06-14 | End: 2019-06-15 | Stop reason: HOSPADM

## 2019-06-14 RX ORDER — ACETAMINOPHEN 650 MG/1
650 SUPPOSITORY RECTAL EVERY 4 HOURS PRN
Status: DISCONTINUED | OUTPATIENT
Start: 2019-06-14 | End: 2019-06-15 | Stop reason: HOSPADM

## 2019-06-14 RX ORDER — ONDANSETRON 4 MG/1
4 TABLET, ORALLY DISINTEGRATING ORAL EVERY 6 HOURS PRN
Status: DISCONTINUED | OUTPATIENT
Start: 2019-06-14 | End: 2019-06-15 | Stop reason: HOSPADM

## 2019-06-14 RX ORDER — ONDANSETRON 2 MG/ML
4 INJECTION INTRAMUSCULAR; INTRAVENOUS ONCE
Status: COMPLETED | OUTPATIENT
Start: 2019-06-14 | End: 2019-06-14

## 2019-06-14 RX ORDER — ACETAMINOPHEN 325 MG/1
650 TABLET ORAL EVERY 4 HOURS PRN
Status: DISCONTINUED | OUTPATIENT
Start: 2019-06-14 | End: 2019-06-15 | Stop reason: HOSPADM

## 2019-06-14 RX ORDER — HYDROMORPHONE HYDROCHLORIDE 1 MG/ML
0.5 INJECTION, SOLUTION INTRAMUSCULAR; INTRAVENOUS; SUBCUTANEOUS
Status: COMPLETED | OUTPATIENT
Start: 2019-06-14 | End: 2019-06-14

## 2019-06-14 RX ORDER — PROCHLORPERAZINE MALEATE 5 MG
10 TABLET ORAL EVERY 6 HOURS PRN
Status: DISCONTINUED | OUTPATIENT
Start: 2019-06-14 | End: 2019-06-15 | Stop reason: HOSPADM

## 2019-06-14 RX ORDER — KETOROLAC TROMETHAMINE 15 MG/ML
15 INJECTION, SOLUTION INTRAMUSCULAR; INTRAVENOUS ONCE
Status: COMPLETED | OUTPATIENT
Start: 2019-06-14 | End: 2019-06-14

## 2019-06-14 RX ORDER — HYDRALAZINE HYDROCHLORIDE 20 MG/ML
10 INJECTION INTRAMUSCULAR; INTRAVENOUS EVERY 4 HOURS PRN
Status: DISCONTINUED | OUTPATIENT
Start: 2019-06-14 | End: 2019-06-15 | Stop reason: HOSPADM

## 2019-06-14 RX ORDER — POLYETHYLENE GLYCOL 3350 17 G/17G
17 POWDER, FOR SOLUTION ORAL DAILY PRN
Status: DISCONTINUED | OUTPATIENT
Start: 2019-06-14 | End: 2019-06-15 | Stop reason: HOSPADM

## 2019-06-14 RX ORDER — ONDANSETRON 2 MG/ML
4 INJECTION INTRAMUSCULAR; INTRAVENOUS EVERY 6 HOURS PRN
Status: DISCONTINUED | OUTPATIENT
Start: 2019-06-14 | End: 2019-06-15 | Stop reason: HOSPADM

## 2019-06-14 RX ORDER — OXYCODONE HYDROCHLORIDE 5 MG/1
5-10 TABLET ORAL EVERY 4 HOURS PRN
Status: DISCONTINUED | OUTPATIENT
Start: 2019-06-14 | End: 2019-06-15 | Stop reason: HOSPADM

## 2019-06-14 RX ORDER — IBUPROFEN 200 MG
200-400 TABLET ORAL
COMMUNITY
End: 2021-09-02

## 2019-06-14 RX ORDER — AMOXICILLIN 250 MG
2 CAPSULE ORAL 2 TIMES DAILY
Status: DISCONTINUED | OUTPATIENT
Start: 2019-06-14 | End: 2019-06-15 | Stop reason: HOSPADM

## 2019-06-14 RX ORDER — TAMSULOSIN HYDROCHLORIDE 0.4 MG/1
0.4 CAPSULE ORAL DAILY
Status: DISCONTINUED | OUTPATIENT
Start: 2019-06-15 | End: 2019-06-15 | Stop reason: HOSPADM

## 2019-06-14 RX ORDER — SODIUM CHLORIDE 9 MG/ML
INJECTION, SOLUTION INTRAVENOUS CONTINUOUS
Status: DISCONTINUED | OUTPATIENT
Start: 2019-06-14 | End: 2019-06-15 | Stop reason: HOSPADM

## 2019-06-14 RX ORDER — PROCHLORPERAZINE 25 MG
25 SUPPOSITORY, RECTAL RECTAL EVERY 12 HOURS PRN
Status: DISCONTINUED | OUTPATIENT
Start: 2019-06-14 | End: 2019-06-15 | Stop reason: HOSPADM

## 2019-06-14 RX ORDER — MORPHINE SULFATE 2 MG/ML
2 INJECTION, SOLUTION INTRAMUSCULAR; INTRAVENOUS
Status: DISCONTINUED | OUTPATIENT
Start: 2019-06-14 | End: 2019-06-15 | Stop reason: HOSPADM

## 2019-06-14 RX ADMIN — ONDANSETRON 4 MG: 2 INJECTION INTRAMUSCULAR; INTRAVENOUS at 13:51

## 2019-06-14 RX ADMIN — SODIUM CHLORIDE: 9 INJECTION, SOLUTION INTRAVENOUS at 22:36

## 2019-06-14 RX ADMIN — KETOROLAC TROMETHAMINE 15 MG: 15 INJECTION, SOLUTION INTRAMUSCULAR; INTRAVENOUS at 17:50

## 2019-06-14 RX ADMIN — ONDANSETRON 4 MG: 2 INJECTION INTRAMUSCULAR; INTRAVENOUS at 22:08

## 2019-06-14 RX ADMIN — OXYCODONE HYDROCHLORIDE 5 MG: 5 TABLET ORAL at 19:48

## 2019-06-14 RX ADMIN — SODIUM CHLORIDE 1000 ML: 9 INJECTION, SOLUTION INTRAVENOUS at 13:51

## 2019-06-14 RX ADMIN — HYDROMORPHONE HYDROCHLORIDE 0.5 MG: 1 INJECTION, SOLUTION INTRAMUSCULAR; INTRAVENOUS; SUBCUTANEOUS at 15:19

## 2019-06-14 RX ADMIN — OXYCODONE HYDROCHLORIDE 5 MG: 5 TABLET ORAL at 22:01

## 2019-06-14 ASSESSMENT — ACTIVITIES OF DAILY LIVING (ADL)
RETIRED_COMMUNICATION: 0-->UNDERSTANDS/COMMUNICATES WITHOUT DIFFICULTY
DRESS: 0-->INDEPENDENT
TRANSFERRING: 0-->INDEPENDENT
RETIRED_EATING: 0-->INDEPENDENT
FALL_HISTORY_WITHIN_LAST_SIX_MONTHS: NO
TOILETING: 0-->INDEPENDENT
AMBULATION: 0-->INDEPENDENT
COGNITION: 0 - NO COGNITION ISSUES REPORTED
BATHING: 0-->INDEPENDENT
SWALLOWING: 0-->SWALLOWS FOODS/LIQUIDS WITHOUT DIFFICULTY

## 2019-06-14 ASSESSMENT — ENCOUNTER SYMPTOMS
DYSURIA: 0
FEVER: 0
VOMITING: 1
ABDOMINAL PAIN: 1
FLANK PAIN: 1
NAUSEA: 1
BLOOD IN STOOL: 0
DIARRHEA: 0
HEMATURIA: 0
FREQUENCY: 0
CHILLS: 0

## 2019-06-14 ASSESSMENT — MIFFLIN-ST. JEOR: SCORE: 1650.89

## 2019-06-14 NOTE — ED TRIAGE NOTES
ABC's intact.  Alert and oriented x4.    Pt dx with kidney stones 6/9 in this ED.  Pt states pain improved on Tuesday and was completely resolved on Wednesday.  Pt states she could feel pain tracking lower and lower until it resolved.  At 0630 this morning, pain returned high in her L flank.  Took one oxycodone at 0800.  At 0830 hand nausea and episode of vomiting.  Took ODT zofran at 0840.  Nausea improved but continues with significant flank pain.  Pt does not report fevers at home.

## 2019-06-14 NOTE — ED PROVIDER NOTES
History     Chief Complaint:  Flank Pain    HPI   Berna Lee is a 59 year old female with a history of calcium oxalate kidney stones who presents with left flank pain. The patient originally developed left flank pain on Sunday, 5 days ago, at which time she was seen here in the ED by Jennifer Hutchins PA-C who obtained laboratory studies and CT abdomen pelvis, ultimately finding a 5 mm stone in the left proximal ureter as well as an 8 mm stone in the left upper kidney. The patient was discharged home with Flomax, Oxycodone, and Zofran and was doing improving steadily; she notes her pain migrated lower and lower until it resolved two days ago, though has not noticed the stone in the strainer. However, this morning round 0630, the patient reports her upper left flank pain returned and persisted, so she took Oxycodone around 0800. About 30 minutes later, the patient notes she became very nauseated and had one episode of vomiting after which she took ODT Zofran. Since then, she states her nausea has improved, but the flank pain has been worsening, so her spouse called her Dr. Samson's clinic and spoke with a nurse who recommended she come to the ED. Here, the patient states her pain is almost identical to her kidney stone pain, though somewhat more severe. She also endorses some upper abdominal pain from dry heaving, but denies any fevers, urinary symptoms, black or bloody stools, or other acute symptoms.    6/9/29 CT Abdomen Pelvis w/o Contrast  1. 5 mm stone in the proximal left ureter with mild left  hydronephrosis.  2. Additional nonobstructing 8 mm stone in the upper left kidney.  3. Moderate to large hiatal hernia.  As read by Radiology.    Allergies:  Augmentin  Codeine sulfate  Contrast dye  Sulfa drugs  Tetracyclines     Medications:    Allegra  Flomax  Oxycodone    Past Medical History:    Kidney stones  Obesity  Allergic state  Depression  Dysplastic nerves    Past Surgical History:    Abdominal  hysterectomy  Combined cystoscopy with retrogrades, lithotripsy, and right ureteral stent placement  Tonsillectomy and adenoidectomy  [place] teeth removal  Left wrist ganglion cyst removal  Left ankle surgery  Bilateral ingrown toenail removal    Family History:    Hypertension  Rheumatoid arthritis  Thyroid disease  Osteoporosis  Cerebrovascular disease  Hyperlipidemia  Coronary artery disease  Obesity  Neurological disorder  Sarcoidosis  Diabetes mellitus    Social History:  Smoking status: No - lives with a smoker  Alcohol use: Yes, socially  Drug use: No  PCP: Bing Fung  Presents to the ED with her spouse  Marital Status:  [2]     Review of Systems   Constitutional: Negative for chills and fever.   Gastrointestinal: Positive for abdominal pain, nausea and vomiting. Negative for blood in stool and diarrhea.   Genitourinary: Positive for flank pain. Negative for dysuria, frequency, hematuria and urgency.   All other systems reviewed and are negative.    Physical Exam     Patient Vitals for the past 24 hrs:   BP Temp Temp src Pulse Resp SpO2   06/14/19 2000 -- -- -- 91 -- 98 %   06/14/19 1900 139/70 -- -- 85 -- 95 %   06/14/19 1815 185/88 -- -- 83 -- 96 %   06/14/19 1800 (!) 192/96 -- -- 84 -- 95 %   06/14/19 1745 (!) 200/109 -- -- 89 -- 94 %   06/14/19 1730 (!) 198/113 -- -- 85 -- 93 %   06/14/19 1715 (!) 184/100 -- -- 85 -- --   06/14/19 1700 167/90 -- -- 83 -- 95 %   06/14/19 1645 172/89 -- -- 84 -- 94 %   06/14/19 1630 168/90 -- -- 84 -- 98 %   06/14/19 1545 (!) 181/102 -- -- 79 -- 94 %   06/14/19 1530 (!) 175/93 -- -- 80 -- 95 %   06/14/19 1224 (!) 187/103 98.8  F (37.1  C) Oral 82 20 98 %     Physical Exam  General: Resting on the bed.  Head: No obvious trauma to head.  Ears, Nose, Throat:  External ears normal.  Nose normal.    Eyes:  Conjunctivae clear.  Pupils are equal, round, and reactive.   Neck: Normal range of motion.  Neck supple.   CV: Regular rate and rhythm.  No murmurs.       Respiratory: Effort normal and breath sounds normal.  No wheezing or crackles.   Gastrointestinal: Soft.  No distension. There is no tenderness.  Musculoskeletal: left cva tenderness   Neuro: Alert. Moving all extremities appropriately.  Normal speech.    Skin: Skin is warm and dry.  No rash noted.     Emergency Department Course   Imaging:  Radiographic findings were communicated with the patient who voiced understanding of the findings.    XR KUB  4 mm calcification overlying the upper pole of the left  kidney. The bowel gas pattern is unremarkable.   As read by Radiology.    CT Abdomen Pelvis w/o contrast:  1. Moderate left hydronephrosis and hydroureter related to a 5 mm  calculus in the distal left ureter. Left perinephric infiltration is  likely related to obstruction, although superimposed pyelonephritis is  not excluded.  2. Moderate to large hiatal hernia.  3. Fatty infiltration of the liver.  As read by Radiology.    Laboratory:  CBC: WBC 13.6 (H), HGB 9.3 (L), o/w WNL ()  BMP: Glucose 121 (H), o/w WNL (Creatinine 0.68)  UA: Trace ketones, pH 7.5 (H), RBC 4 (H), mucous present, o/w negative  Urine culture: In process    Interventions:  1351: NS 1L IV Bolus  1351: 4 mg Zofran IV  1519: 0.5 mg Dilaudid IV  1750: 15 mg Toradol IV  1948: 5 mg Oxycodone PO    Emergency Department Course:  Past medical records, nursing notes, and vitals reviewed.  1520: I performed an exam of the patient and obtained history, as documented above.  IV inserted and blood drawn. UA performed, results above.  The patient was sent for a KUB x-ray while in the emergency department, findings above.    1724: I rechecked the patient. We will send her for a CT abdomen pelvis, findings above.    2003: I rechecked the patient. Findings and plan explained to the patient who consents to admission.     2027: Discussed the patient with Dr. Santos, who will admit the patient to an observation bed for further monitoring, evaluation,  and treatment.     Impression & Plan    Medical Decision Making:  Berna Lee is a 59-year-old female with history of known renal stones presents with left flank pain. Vital signs show hypertension initially, but this improved with pain control. A broad differential was pursued including, but not limited to infected stone, ureteral colic, electric/metabolic/renal dysfunction, obstruction, diverticulitis, colitis, etc. CBC shows mild leukocytosis at 13.6, which I suspect is stress demargination in the setting of pain. The patient has ongoing anemia at 9.3, which is unchanged from previous check. BMP shows no acute electrolyte, metabolic, or renal dysfunction. UA shows 4 red blood cells, but no overt signs of infection. Culture is pending. I do not suspect infected stone, pyelonephritis, or UTI. KUB was initially obtained, showing a 4 mm stone in the left kidney, but no other acute etiology. The patient's pain is much lower. After risk-benefit discussion with the patient, we opted to repeat a CT scan. CT shows moderate left hydronephrosis and hydroureter related to a 5 mm calculus in the distal left ureter. There is also some perinephric infiltration secondary to obstruction. There were no other acute findings noted on CT scan. The patient's pain was difficult to control, so I will plan to admit her for observation for pain control. She was discussed with the hospitalist who graciously accepted.    Diagnosis:    ICD-10-CM   1. At risk for inadequate pain control Z91.89   2. Ureteral colic N23     Disposition: Admitted to observation    Jenna King  6/14/2019   [facility] EMERGENCY DEPARTMENT    I, Jenna King, am serving as a scribe at 3:20 PM on 6/14/2019 to document services personally performed by Ilana Anglin MD based on my observations and the provider's statements to me.      Ilana Anglin MD  06/15/19 0028

## 2019-06-14 NOTE — ED NOTES
"Patient put on call light requesting pain medication. No orders for medication at this time. Patient was told that writer would speak with MD to get orders. Patient's  stated \"this is emergent, we have been here for three hours\". Writer apologized and stated that the ER was very busy today.  stated \"we know that but we need pain medication\". Patient then states she need \"IV pain medication\". MD notified and orders for Dilaudid where placed and administered per MAR.   "

## 2019-06-15 VITALS
DIASTOLIC BLOOD PRESSURE: 68 MMHG | RESPIRATION RATE: 18 BRPM | WEIGHT: 238.1 LBS | HEIGHT: 65 IN | OXYGEN SATURATION: 97 % | HEART RATE: 88 BPM | BODY MASS INDEX: 39.67 KG/M2 | SYSTOLIC BLOOD PRESSURE: 145 MMHG | TEMPERATURE: 98.4 F

## 2019-06-15 LAB
ANION GAP SERPL CALCULATED.3IONS-SCNC: 7 MMOL/L (ref 3–14)
BACTERIA SPEC CULT: NORMAL
BASOPHILS # BLD AUTO: 0 10E9/L (ref 0–0.2)
BASOPHILS NFR BLD AUTO: 0.2 %
BUN SERPL-MCNC: 13 MG/DL (ref 7–30)
CALCIUM SERPL-MCNC: 8.4 MG/DL (ref 8.5–10.1)
CHLORIDE SERPL-SCNC: 105 MMOL/L (ref 94–109)
CO2 SERPL-SCNC: 26 MMOL/L (ref 20–32)
CREAT SERPL-MCNC: 0.92 MG/DL (ref 0.52–1.04)
DIFFERENTIAL METHOD BLD: ABNORMAL
EOSINOPHIL # BLD AUTO: 0 10E9/L (ref 0–0.7)
EOSINOPHIL NFR BLD AUTO: 0.2 %
ERYTHROCYTE [DISTWIDTH] IN BLOOD BY AUTOMATED COUNT: 16.1 % (ref 10–15)
GFR SERPL CREATININE-BSD FRML MDRD: 67 ML/MIN/{1.73_M2}
GLUCOSE SERPL-MCNC: 114 MG/DL (ref 70–99)
HCT VFR BLD AUTO: 28.8 % (ref 35–47)
HGB BLD-MCNC: 8.5 G/DL (ref 11.7–15.7)
IMM GRANULOCYTES # BLD: 0.1 10E9/L (ref 0–0.4)
IMM GRANULOCYTES NFR BLD: 0.6 %
LYMPHOCYTES # BLD AUTO: 2.2 10E9/L (ref 0.8–5.3)
LYMPHOCYTES NFR BLD AUTO: 13.4 %
Lab: NORMAL
MCH RBC QN AUTO: 23 PG (ref 26.5–33)
MCHC RBC AUTO-ENTMCNC: 29.5 G/DL (ref 31.5–36.5)
MCV RBC AUTO: 78 FL (ref 78–100)
MONOCYTES # BLD AUTO: 1.2 10E9/L (ref 0–1.3)
MONOCYTES NFR BLD AUTO: 7.7 %
NEUTROPHILS # BLD AUTO: 12.5 10E9/L (ref 1.6–8.3)
NEUTROPHILS NFR BLD AUTO: 77.9 %
NRBC # BLD AUTO: 0 10*3/UL
NRBC BLD AUTO-RTO: 0 /100
PLATELET # BLD AUTO: 387 10E9/L (ref 150–450)
POTASSIUM SERPL-SCNC: 3.9 MMOL/L (ref 3.4–5.3)
RBC # BLD AUTO: 3.69 10E12/L (ref 3.8–5.2)
RETICS # AUTO: 77.9 10E9/L (ref 25–95)
RETICS/RBC NFR AUTO: 2.1 % (ref 0.5–2)
SODIUM SERPL-SCNC: 138 MMOL/L (ref 133–144)
SPECIMEN SOURCE: NORMAL
WBC # BLD AUTO: 16 10E9/L (ref 4–11)

## 2019-06-15 PROCEDURE — 96375 TX/PRO/DX INJ NEW DRUG ADDON: CPT

## 2019-06-15 PROCEDURE — 25000132 ZZH RX MED GY IP 250 OP 250 PS 637: Performed by: INTERNAL MEDICINE

## 2019-06-15 PROCEDURE — 85025 COMPLETE CBC W/AUTO DIFF WBC: CPT | Performed by: INTERNAL MEDICINE

## 2019-06-15 PROCEDURE — 40000847 ZZHCL STATISTIC MORPHOLOGY W/INTERP HISTOLOGY TC 85060: Performed by: INTERNAL MEDICINE

## 2019-06-15 PROCEDURE — 25000131 ZZH RX MED GY IP 250 OP 636 PS 637: Performed by: INTERNAL MEDICINE

## 2019-06-15 PROCEDURE — 99217 ZZC OBSERVATION CARE DISCHARGE: CPT | Performed by: PHYSICIAN ASSISTANT

## 2019-06-15 PROCEDURE — G0378 HOSPITAL OBSERVATION PER HR: HCPCS

## 2019-06-15 PROCEDURE — 80048 BASIC METABOLIC PNL TOTAL CA: CPT | Performed by: INTERNAL MEDICINE

## 2019-06-15 PROCEDURE — 85045 AUTOMATED RETICULOCYTE COUNT: CPT | Performed by: INTERNAL MEDICINE

## 2019-06-15 PROCEDURE — 36415 COLL VENOUS BLD VENIPUNCTURE: CPT | Performed by: INTERNAL MEDICINE

## 2019-06-15 PROCEDURE — 96361 HYDRATE IV INFUSION ADD-ON: CPT

## 2019-06-15 PROCEDURE — 96376 TX/PRO/DX INJ SAME DRUG ADON: CPT

## 2019-06-15 PROCEDURE — 25000128 H RX IP 250 OP 636: Performed by: INTERNAL MEDICINE

## 2019-06-15 PROCEDURE — 85060 BLOOD SMEAR INTERPRETATION: CPT | Performed by: INTERNAL MEDICINE

## 2019-06-15 PROCEDURE — 25800030 ZZH RX IP 258 OP 636: Performed by: INTERNAL MEDICINE

## 2019-06-15 RX ORDER — OXYCODONE HYDROCHLORIDE 5 MG/1
5-10 TABLET ORAL EVERY 4 HOURS PRN
Qty: 20 TABLET | Refills: 0 | Status: SHIPPED | OUTPATIENT
Start: 2019-06-15 | End: 2019-06-26

## 2019-06-15 RX ORDER — TAMSULOSIN HYDROCHLORIDE 0.4 MG/1
0.4 CAPSULE ORAL DAILY
Qty: 10 CAPSULE | Refills: 0 | Status: SHIPPED | OUTPATIENT
Start: 2019-06-15 | End: 2019-06-26

## 2019-06-15 RX ORDER — ONDANSETRON 4 MG/1
4 TABLET, ORALLY DISINTEGRATING ORAL EVERY 8 HOURS PRN
Qty: 10 TABLET | Refills: 0 | Status: SHIPPED | OUTPATIENT
Start: 2019-06-15 | End: 2019-06-26

## 2019-06-15 RX ADMIN — TAMSULOSIN HYDROCHLORIDE 0.4 MG: 0.4 CAPSULE ORAL at 07:53

## 2019-06-15 RX ADMIN — ONDANSETRON 4 MG: 4 TABLET, ORALLY DISINTEGRATING ORAL at 16:07

## 2019-06-15 RX ADMIN — SODIUM CHLORIDE: 9 INJECTION, SOLUTION INTRAVENOUS at 08:02

## 2019-06-15 RX ADMIN — SENNOSIDES AND DOCUSATE SODIUM 1 TABLET: 8.6; 5 TABLET ORAL at 07:53

## 2019-06-15 RX ADMIN — ONDANSETRON 4 MG: 2 INJECTION INTRAMUSCULAR; INTRAVENOUS at 04:31

## 2019-06-15 RX ADMIN — ACETAMINOPHEN 650 MG: 325 TABLET, FILM COATED ORAL at 16:07

## 2019-06-15 RX ADMIN — OXYCODONE HYDROCHLORIDE 10 MG: 5 TABLET ORAL at 14:23

## 2019-06-15 RX ADMIN — OXYCODONE HYDROCHLORIDE 10 MG: 5 TABLET ORAL at 02:21

## 2019-06-15 RX ADMIN — OXYCODONE HYDROCHLORIDE 10 MG: 5 TABLET ORAL at 07:53

## 2019-06-15 RX ADMIN — PROCHLORPERAZINE EDISYLATE 10 MG: 5 INJECTION INTRAMUSCULAR; INTRAVENOUS at 09:31

## 2019-06-15 NOTE — H&P
[facility]    History and Physical - Hospitalist Service       Date of Admission:  6/14/2019    Assessment & Plan   Berna Lee is a 59 year old female admitted on 6/14/2019. She has a past medical history significant for kidney stones and depression.  She presented to emergency room with left flank pain.  Found to have ureterolithiasis.    1.  Ureterolithiasis.  Urology consult.  Pain medications as needed.  N.p.o. after midnight.  Continuous IV fluids.  Tamsulosin 0.4 mg daily.    2.  Hypertension.  Likely related to pain.  Pain medications as needed.  IV hydralazine if needed.    3.  Anemia.  No obvious ongoing hemorrhage.  Check peripheral blood smear and reticulocyte count.     Diet: Regular Diet Adult  NPO for Medical/Clinical Reasons Except for: No Exceptions    DVT Prophylaxis: Ambulate every shift  Ashley Catheter: not present  Code Status: Full Code      Disposition Plan   Expected discharge: Tomorrow  Entered: Marcos Santos DO 06/14/2019, 9:42 PM         Marcos Santos, DO  [facility]    ______________________________________________________________________    Chief Complaint   Left flank pain.    History is obtained from the patient    History of Present Illness   Berna Lee is a 59 year old female who has a past medical history significant for depression and nephrolithiasis.  She presented to emergency room with left flank pain a few days ago.  Diagnosed with kidney stone at that time.  Sent home with pain medications and nausea medications.  Had actually felt better for the past 2 days.  Unfortunately, earlier today, he began having significantly increased left flank pain.  Was unable to control pain with medications at home.  Presented to emergency room for further evaluation.  She continues to have left flank pain at this time.  Pain medications given in the emergency room did help with pain.  She is also having nausea and vomiting today.  She  has felt chills.  No fevers.  No other complaints.    Review of Systems    The 10 point Review of Systems is negative other than noted in the HPI     Past Medical History    I have reviewed this patient's medical history and updated it with pertinent information if needed.   Past Medical History:   Diagnosis Date     Allergic state      Depressive disorder 2003 - 2004    treated with Celexa     History of blood transfusion     prior to hyst     Kidney stones 1989, 2007,1998,2003,2007     Nephrolithiasis 3/2/2015     PONV (postoperative nausea and vomiting)     n/v postop       Past Surgical History   I have reviewed this patient's surgical history and updated it with pertinent information if needed.  Past Surgical History:   Procedure Laterality Date     ABDOMEN SURGERY  Feb 1996    Abdominal Hysterectomy     BIOPSY  July 2013    mole on back     COLONOSCOPY  8/2/2013 Dr Sow [facility]     COLONOSCOPY  8/2/2013    Procedure: COLONOSCOPY;  Colonoscopy;  Surgeon: Nikita Sow MD;  Location:  GI     COMBINED CYSTOSCOPY, RETROGRADES, URETEROSCOPY, LASER HOLMIUM LITHOTRIPSY URETER(S), INSERT STENT Right 3/3/2015    Procedure: COMBINED CYSTOSCOPY, RETROGRADES, URETEROSCOPY, LASER HOLMIUM LITHOTRIPSY URETER(S), INSERT STENT;  Surgeon: Carlos Samson MD;  Location: [facility] OR     ENT SURGERY  1964    T&A as a child     GENITOURINARY SURGERY  3/3/15 Dr Samson    Kidney Stone Removed - Cystoscopy  Right side     HEAD & NECK SURGERY  1975    wisdom teeth removed     HYSTERECTOMY, PAP NO LONGER INDICATED       ORTHOPEDIC SURGERY  May 2015    Broken ankle - left     SOFT TISSUE SURGERY  1982    left wrist ganglion cyst     SOFT TISSUE SURGERY  1981    bilateral ingrown toenail removal surgery       Social History   I have reviewed this patient's social history and updated it with pertinent information if needed.  Social History     Tobacco Use     Smoking status: Passive Smoke Exposure - Never Smoker     Smokeless tobacco:  Never Used     Tobacco comment: Live with a smoker   Substance Use Topics     Alcohol use: Yes     Alcohol/week: 0.0 oz     Comment: occasional , social     Drug use: No       Family History   I have reviewed this patient's family history and updated it with pertinent information if needed.   Family History   Problem Relation Age of Onset     Hypertension Mother      Arthritis Mother         rheumatoid arthritis     Thyroid Disease Mother      Osteoporosis Mother      Neurologic Disorder Mother      Genitourinary Problems Mother      Cerebrovascular Disease Mother      Anxiety Disorder Mother      Obesity Mother      Unknown/Adopted Mother         Rheumatoid Arthritis/ HypoThyroid     Hypertension Father      Lipids Father      Coronary Artery Disease Father      Hyperlipidemia Father      Obesity Father      Depression Maternal Grandmother      Thyroid Disease Maternal Grandmother      Gastrointestinal Disease Maternal Grandmother      Diabetes Maternal Grandfather      Genitourinary Problems Maternal Grandfather      Thyroid Disease Sister      Obesity Sister      Hypertension Sister      Hyperlipidemia Sister      Thyroid Disease Sister      Obesity Sister      Unknown/Adopted Sister         Rheumatiod Arthritis/HypoThyroid     Obesity Paternal Grandmother      Unknown/Adopted Son         Sarcoidosis/Brain       Prior to Admission Medications   Prior to Admission Medications   Prescriptions Last Dose Informant Patient Reported? Taking?   Dextromethorphan-Guaifenesin (MUCINEX DM PO)   Yes No   Sig: Take by mouth At Bedtime    Fexofenadine HCl (ALLEGRA ALLERGY PO)   Yes No   Sig: Take 180 mg by mouth daily   Nasal Dilators (BREATHE RIGHT)   Yes No   PROBIOTIC PRODUCT PO   Yes No   Sig: Take by mouth daily   econazole nitrate 1 % cream   No No   Sig: Apply topically daily   ondansetron (ZOFRAN ODT) 4 MG ODT tab   No No   Sig: Take 1 tablet (4 mg) by mouth every 8 hours as needed for nausea   oxyCODONE (ROXICODONE) 5  MG tablet   No No   Sig: Take 1 tablet (5 mg) by mouth every 6 hours as needed for pain   tamsulosin (FLOMAX) 0.4 MG capsule   No No   Sig: Take 1 capsule (0.4 mg) by mouth daily for 10 doses      Facility-Administered Medications: None     Allergies   Allergies   Allergen Reactions     Augmentin Rash     Codeine Sulfate Rash     Ivp Dye [Contrast Dye] Rash     Sulfa Drugs Rash     Tetracyclines Rash       Physical Exam   Vital Signs: Temp: 97.7  F (36.5  C) Temp src: Oral BP: 155/75 Pulse: 91   Resp: 18 SpO2: 97 % O2 Device: None (Room air)    Weight: 238 lbs 1.6 oz    Gen:  NAD, A&Ox3.  Eyes:  PERRL, sclera anicteric.  OP:  MMM, no lesions.  Neck:  Supple.  CV:  Regular, no murmurs.  Lung:  CTA b/l, normal effort.  Ab:  +BS, soft.  Skin:  Warm, dry to touch.  No rash.  Ext:  No pitting edema LE b/l.      Data   Data reviewed today: I reviewed all medications, new labs and imaging results over the last 24 hours.     Recent Labs   Lab 06/14/19  1353 06/09/19  1601   WBC 13.6* 11.7*   HGB 9.3* 9.5*   MCV 77* 79    454*    142   POTASSIUM 4.3 4.1   CHLORIDE 104 109   CO2 28 28   BUN 16 17   CR 0.68 0.86   ANIONGAP 6 5   JULEE 8.8 8.5   * 126*   ALBUMIN  --  3.5   PROTTOTAL  --  7.4   BILITOTAL  --  0.3   ALKPHOS  --  119   ALT  --  20   AST  --  14   LIPASE  --  89

## 2019-06-15 NOTE — PLAN OF CARE
"PRIMARY DIAGNOSIS: ACUTE RENAL COLIC     OUTPATIENT/OBSERVATION GOALS TO BE MET BEFORE DISCHARGE  1. Pain Status: Improved-controlled with oral pain medications.     2. Tolerating adequate PO diet: NPO      3. Surgical Intervention planned: urology consult     4. Cleared by consultants (if involved): No     5. Return to near baseline physical activity: Yes     Discharge Planner Nurse   Safe discharge environment identified: Yes  Barriers to discharge: Yes       Entered by: Jose Boss 06/15/2019      Pt alert and oriented x4. She is having pain 4/10 in her L flank. Given oxycodone. Compazine for intermittent nausea. Independent in room. Regular diet now - hope to tolerate food w/ out nausea. Straining urine. NS @ 100ml/hr.Potential discharge this afternoon.      /71 (BP Location: Left arm)   Pulse 90   Temp 98  F (36.7  C) (Oral)   Resp 18   Ht 1.651 m (5' 5\")   Wt 108 kg (238 lb 1.6 oz)   SpO2 98%   BMI 39.62 kg/m      Please review provider order for any additional goals.   Nurse to notify provider when observation goals have been met and patient is ready for discharge.              "

## 2019-06-15 NOTE — CONSULTS
Urology Consult    Name:  Berna Lee  MRN:  [id number]  Age/: 59 year old, 1960    CC: ureteral stone    HPI: Berna Lee is a(n) 59 year old female with a history of nephrolithiasis who has previously undergone URS with Dr. Samson who was admitted from the ER last night with left flank pain.  Work-up revealed a 5 mm distal ureteral stone as well as a roughly 6 mm right renal pelvis stone.  Further work-up was notable for a mild leukocytosis with otherwise normal serum labs and negative urinalysis.  She has been afebrile with normal vitals other than hypertension.    On exam today she reports improvement in her pain with medication. Her nausea is currently controlled.     Past Medical History:  Past Medical History:   Diagnosis Date     Allergic state      Depressive disorder  -     treated with Celexa     History of blood transfusion     prior to hyst     Kidney stones , ,,,     Nephrolithiasis 3/2/2015     PONV (postoperative nausea and vomiting)     n/v postop       Past Surgical History:  Past Surgical History:   Procedure Laterality Date     ABDOMEN SURGERY  1996    Abdominal Hysterectomy     BIOPSY  2013    mole on back     COLONOSCOPY  2013 Dr Sow [facility]     COLONOSCOPY  2013    Procedure: COLONOSCOPY;  Colonoscopy;  Surgeon: Nikita Sow MD;  Location:  GI     COMBINED CYSTOSCOPY, RETROGRADES, URETEROSCOPY, LASER HOLMIUM LITHOTRIPSY URETER(S), INSERT STENT Right 3/3/2015    Procedure: COMBINED CYSTOSCOPY, RETROGRADES, URETEROSCOPY, LASER HOLMIUM LITHOTRIPSY URETER(S), INSERT STENT;  Surgeon: Carlos Samson MD;  Location:  OR     ENT SURGERY      T&A as a child     GENITOURINARY SURGERY  3/3/15 Dr Samson    Kidney Stone Removed - Cystoscopy  Right side     HEAD & NECK SURGERY      wisdom teeth removed     HYSTERECTOMY, PAP NO LONGER INDICATED       ORTHOPEDIC SURGERY  May 2015    Broken ankle - left     SOFT TISSUE  SURGERY  1982    left wrist ganglion cyst     SOFT TISSUE SURGERY  1981    bilateral ingrown toenail removal surgery       Allergies:     Allergies   Allergen Reactions     Augmentin Rash     Codeine Sulfate Rash     Ivp Dye [Contrast Dye] Rash     Sulfa Drugs Rash     Tetracyclines Rash       Medications:    No current facility-administered medications on file prior to encounter.   Current Outpatient Medications on File Prior to Encounter:  Fexofenadine HCl (ALLEGRA ALLERGY PO) Take 180 mg by mouth At Bedtime    ibuprofen (ADVIL/MOTRIN) 200 MG tablet Take 200-400 mg by mouth nightly as needed for mild pain With food/snack   Nasal Dilators (BREATHE RIGHT)    PROBIOTIC PRODUCT PO Take 1 capsule by mouth daily    econazole nitrate 1 % cream Apply topically daily (Patient not taking: Reported on 6/14/2019)       Social History:  Social History     Socioeconomic History     Marital status:      Spouse name: Not on file     Number of children: Not on file     Years of education: Not on file     Highest education level: Not on file   Occupational History     Not on file   Social Needs     Financial resource strain: Not on file     Food insecurity:     Worry: Not on file     Inability: Not on file     Transportation needs:     Medical: Not on file     Non-medical: Not on file   Tobacco Use     Smoking status: Passive Smoke Exposure - Never Smoker     Smokeless tobacco: Never Used     Tobacco comment: Live with a smoker   Substance and Sexual Activity     Alcohol use: Yes     Alcohol/week: 0.0 oz     Comment: occasional , social     Drug use: No     Sexual activity: Yes     Partners: Male     Birth control/protection: Post-menopausal, None     Comment: Hysterectomy 1996   Lifestyle     Physical activity:     Days per week: Not on file     Minutes per session: Not on file     Stress: Not on file   Relationships     Social connections:     Talks on phone: Not on file     Gets together: Not on file     Attends Buddhist  "service: Not on file     Active member of club or organization: Not on file     Attends meetings of clubs or organizations: Not on file     Relationship status: Not on file     Intimate partner violence:     Fear of current or ex partner: Not on file     Emotionally abused: Not on file     Physically abused: Not on file     Forced sexual activity: Not on file   Other Topics Concern     Parent/sibling w/ CABG, MI or angioplasty before 65F 55M? No   Social History Narrative     Not on file       Family History:  Family History   Problem Relation Age of Onset     Hypertension Mother      Arthritis Mother         rheumatoid arthritis     Thyroid Disease Mother      Osteoporosis Mother      Neurologic Disorder Mother      Genitourinary Problems Mother      Cerebrovascular Disease Mother      Anxiety Disorder Mother      Obesity Mother      Unknown/Adopted Mother         Rheumatoid Arthritis/ HypoThyroid     Hypertension Father      Lipids Father      Coronary Artery Disease Father      Hyperlipidemia Father      Obesity Father      Depression Maternal Grandmother      Thyroid Disease Maternal Grandmother      Gastrointestinal Disease Maternal Grandmother      Diabetes Maternal Grandfather      Genitourinary Problems Maternal Grandfather      Thyroid Disease Sister      Obesity Sister      Hypertension Sister      Hyperlipidemia Sister      Thyroid Disease Sister      Obesity Sister      Unknown/Adopted Sister         Rheumatiod Arthritis/HypoThyroid     Obesity Paternal Grandmother      Unknown/Adopted Son         Sarcoidosis/Brain       ROS:  The remainder of the complete ROS was negative unless noted in the HPI.    Exam:  /71 (BP Location: Left arm)   Pulse 90   Temp 98  F (36.7  C) (Oral)   Resp 18   Ht 1.651 m (5' 5\")   Wt 108 kg (238 lb 1.6 oz)   SpO2 98%   BMI 39.62 kg/m    General: Alert, interactive, & in NAD  Abdomen: Soft, nontender, nondistended.    Labs:  WBC 16, Hgb 8.5 (chronic anemia)  Cr " 0.92  UA with negative LE, negative nitrite, 4 RBC, 1 WBC    Imagin19 CT reviewed. 5mm left distal ureteral stone with 5mm left renal pelvis stone.     Assessment and Plan: Berna Lee is a(n) 59 year old female with symptomatic left ureteral stone.    -I discussed options with the patient and her  including stent placement today for pain control versus outpatient management with flexible ureteroscopy to clear all of her stone burden versus combined semirigid ureteroscopy and as well for her ureteral and renal pelvis stones.  She prefers to proceed with flexible ureteroscopy to attempt to clear all of her stone burden in one procedure.  We did discuss that roughly 10% of the time possible ureteroscopy is not successful on the first attempt due to narrowed ureteral size.  -Okay to discharge home today from a urology standpoint.  Please discharge with pain and nausea medication; no need for antibiotics.    -We will schedule her on  for flexible ureteroscopy with Dr. Reid.  Our clinic will contact her early next week with further details.    Discussed with Dr. Blankenship.     Delfino Garg MD  Urology PGY5  Pager [phone]

## 2019-06-15 NOTE — ED NOTES
[facility]  ED Nurse Handoff Report    Berna Lee is a 59 year old female   ED Chief complaint: Flank Pain  . ED Diagnosis:   Final diagnoses:   At risk for inadequate pain control   Ureteral colic     Allergies:   Allergies   Allergen Reactions     Augmentin Rash     Codeine Sulfate Rash     Ivp Dye [Contrast Dye] Rash     Sulfa Drugs Rash     Tetracyclines Rash       Code Status: Full Code  Activity level - Baseline/Home:  Independent. Activity Level - Current:   Independent. Lift room needed: No. Bariatric: No   Needed: Yes   Isolation: No. Infection: Not Applicable.     Vital Signs:   Vitals:    06/14/19 1745 06/14/19 1800 06/14/19 1815 06/14/19 1900   BP: (!) 200/109 (!) 192/96 185/88 139/70   Pulse: 89 84 83 85   Resp:       Temp:       TempSrc:       SpO2: 94% 95% 96% 95%       Cardiac Rhythm:  ,      Pain level: 0-10 Pain Scale: 5  Patient confused: No. Patient Falls Risk: No.   Elimination Status: Has voided   Patient Report - Initial Complaint: ABC's intact.  Alert and oriented x4.     Pt dx with kidney stones 6/9 in this ED.  Pt states pain improved on Tuesday and was completely resolved on Wednesday.  Pt states she could feel pain tracking lower and lower until it resolved.  At 0630 this morning, pain returned high in her L flank.  Took one oxycodone at 0800.  At 0830 hand nausea and episode of vomiting.  Took ODT zofran at 0840.  Nausea improved but continues with significant flank pain.  Pt does not report fevers at home.. Focused Assessment: Genitourinary - Genitourinary WDL: general symptoms   Genitourinary -  Signs and Symptoms: flank pain (left side)   Tests Performed: Lab, CT, KUB. Abnormal Results:   Labs Ordered and Resulted from Time of ED Arrival Up to the Time of Departure from the ED   CBC WITH PLATELETS - Abnormal; Notable for the following components:       Result Value    WBC 13.6 (*)     Hemoglobin 9.3 (*)     Hematocrit 31.8 (*)     MCV 77 (*)     MCH  22.6 (*)     MCHC 29.2 (*)     RDW 16.0 (*)     All other components within normal limits   BASIC METABOLIC PANEL - Abnormal; Notable for the following components:    Glucose 121 (*)     All other components within normal limits   ROUTINE UA WITH MICROSCOPIC - Abnormal; Notable for the following components:    Ketones Urine Trace (*)     pH Urine 7.5 (*)     RBC Urine 4 (*)     Mucous Urine Present (*)     All other components within normal limits   PULSE OXIMETRY NURSING   STRAIN URINE   URINE CULTURE AEROBIC BACTERIAL     CT Abdomen Pelvis w/o Contrast   Final Result   IMPRESSION:   1. Moderate left hydronephrosis and hydroureter related to a 5 mm   calculus in the distal left ureter. Left perinephric infiltration is   likely related to obstruction, although superimposed pyelonephritis is   not excluded.   2. Moderate to large hiatal hernia.   3. Fatty infiltration of the liver.      SHUKRI COYLE MD      XR KUB   Final Result   IMPRESSION: 4 mm calcification overlying the upper pole of the left   kidney. The bowel gas pattern is unremarkable.       ZO RICHARD MD      .   Treatments provided: See MAR  Family Comments:  present  OBS brochure/video discussed/provided to patient:  Yes  ED Medications:   Medications   0.9% sodium chloride BOLUS (0 mLs Intravenous Stopped 6/14/19 1751)   ondansetron (ZOFRAN) injection 4 mg (4 mg Intravenous Given 6/14/19 1351)   HYDROmorphone (PF) (DILAUDID) injection 0.5 mg (0.5 mg Intravenous Given 6/14/19 1519)   ketorolac (TORADOL) injection 15 mg (15 mg Intravenous Given 6/14/19 1750)   oxyCODONE (ROXICODONE) tablet 5 mg (5 mg Oral Given 6/14/19 1948)     Drips infusing:  No  For the majority of the shift, the patient's behavior Green. Interventions performed were none.     Severe Sepsis OR Septic Shock Diagnosis Present: No      ED Nurse Name/Phone Number: Joey CHANTALE Morataya,   8:21 PM    RECEIVING UNIT ED HANDOFF REVIEW    Above ED Nurse Handoff Report was reviewed:  Yes  Reviewed by: Yanni Melendez on June 14, 2019 at 8:49 PM

## 2019-06-15 NOTE — PHARMACY-ADMISSION MEDICATION HISTORY
Admission medication history interview status for this patient is complete. See [facility] admission navigator for allergy information, prior to admission medications and immunization status.     Medication history interview source(s):Patient  Medication history resources (including written lists, pill bottles, clinic record):EPIC    Changes made to PTA medication list:  Added: Ibuprofen prn  Deleted: Mucinex dm  Changed: times    Medication reconciliation/reorder completed by provider prior to medication history? No    Do you take OTC medications (eg tylenol, ibuprofen, fish oil, eye/ear drops, etc)? Y(Y/N)    For patients on insulin therapy: N (Y/N)  Lantus/levemir/NPH/Mix 70/30 dose:   (Y/N) (see Med list for doses)   Sliding scale Novolog Y/N  If Yes, do you have a baseline novolog pre-meal dose:  units with meals  Patients eat three meals a day:   Y/N    How many episodes of hypoglycemia do you have per week: _______  How many missed doses do you have per week: ______  How many times do you check your blood glucose per day: _______  Do you have a Continuous glucose monitor (CGM)   Y/N (remind pt that not approved for hospital use)   Any Barriers to therapy - Be specific :  cost of medications, comfortable with giving injections (if applicable), comfortable and confident with current diabetes regimen: Y/N ______________      Prior to Admission medications    Medication Sig Last Dose Taking? Auth Provider   Fexofenadine HCl (ALLEGRA ALLERGY PO) Take 180 mg by mouth At Bedtime  6/13/2019 at Unknown time Yes Reported, Patient   ibuprofen (ADVIL/MOTRIN) 200 MG tablet Take 200-400 mg by mouth nightly as needed for mild pain With food/snack  Yes Unknown, Entered By History   Nasal Dilators (BREATHE RIGHT)   Yes Reported, Patient   ondansetron (ZOFRAN ODT) 4 MG ODT tab Take 1 tablet (4 mg) by mouth every 8 hours as needed for nausea 6/14/2019 at Unknown time Yes Jennifer Hutchins PA   oxyCODONE (ROXICODONE) 5 MG tablet  Take 1 tablet (5 mg) by mouth every 6 hours as needed for pain 6/14/2019 at Unknown time Yes Jennifer Hutchins PA   PROBIOTIC PRODUCT PO Take 1 capsule by mouth daily  6/13/2019 at am Yes Reported, Patient   tamsulosin (FLOMAX) 0.4 MG capsule Take 1 capsule (0.4 mg) by mouth daily for 10 doses 6/13/2019 at hs, 4 pills left to take Yes Jennifer Hutchins PA   econazole nitrate 1 % cream Apply topically daily  Patient not taking: Reported on 6/14/2019 Not Taking at this time at Unknown time  Bing Fung MD

## 2019-06-15 NOTE — PLAN OF CARE
PRIMARY DIAGNOSIS: ACUTE RENAL COLIC    OUTPATIENT/OBSERVATION GOALS TO BE MET BEFORE DISCHARGE  1. Pain Status: Improved-controlled with oral pain medications.    2. Tolerating adequate PO diet: Yes    3. Surgical Intervention planned: No    4. Cleared by consultants (if involved): Yes    5. Return to near baseline physical activity: Yes    Discharge Planner Nurse   Safe discharge environment identified: Yes  Barriers to discharge: No       Entered by: Prachi Corbett 06/15/2019 6:34 PM     Please review provider order for any additional goals.   Nurse to notify provider when observation goals have been met and patient is ready for discharge.    Patient is Alert and Oriented x4. Vitals are Temp: 98.4  F (36.9  C) Temp src: Oral BP: 145/68 Pulse: 88   Resp: 18 SpO2: 97 % O2 Device: None (Room air)   Pt is complaining of 4/10 pain in their Left flank pain.  Tylenol given for pain.  Pt was reporting nausea with increased pain. Oral Zofran was given with relief. They are independent with Activity.    Pt is on a Regular diet. Patient has Normal Saline 0.9% running at 100 mL per hour.  Plan: pt would like to go home and manage her pain at home. To finish bag of NS then pt will discharge home with pain meds and antiemetics.

## 2019-06-15 NOTE — PLAN OF CARE
PRIMARY DIAGNOSIS: ACUTE RENAL COLIC    OUTPATIENT/OBSERVATION GOALS TO BE MET BEFORE DISCHARGE  1. Pain Status: Improved-controlled with oral pain medications.    2. Tolerating adequate PO diet: NPO     3. Surgical Intervention planned: Not yet     4. Cleared by consultants (if involved): No    5. Return to near baseline physical activity: Yes    Discharge Planner Nurse   Safe discharge environment identified: Yes  Barriers to discharge: Yes       Entered by: Yanni Melendez 06/15/2019 1:23 AM     A&Ox4, hypertensive otherwise VSS. NPO at midnight. IVF infusing. Pt up independent, straining urine. Received oxycodone for L flank pain. Also received IV zofran for nausea. Urology consult in AM. Will continue to monitor.   Temp: 98.9  F (37.2  C) Temp src: Oral BP: 167/69 Pulse: 89   Resp: 16 SpO2: 96 % O2 Device: None (Room air)       Please review provider order for any additional goals.   Nurse to notify provider when observation goals have been met and patient is ready for discharge.

## 2019-06-15 NOTE — PLAN OF CARE
"PRIMARY DIAGNOSIS: ACUTE RENAL COLIC    OUTPATIENT/OBSERVATION GOALS TO BE MET BEFORE DISCHARGE  1. Pain Status: Improved-controlled with oral pain medications.    2. Tolerating adequate PO diet: NPO     3. Surgical Intervention planned: urology consult    4. Cleared by consultants (if involved): No    5. Return to near baseline physical activity: Yes    Discharge Planner Nurse   Safe discharge environment identified: Yes  Barriers to discharge: Yes       Entered by: Jose Boss 06/15/2019     Pt alert and oriented x4. She is having pain 5/10 in her L flank. Given oxycodone. Compazine for intermittent nausea. Independent in room. Has been NPO since midnight. Straining urine. NS @ 100ml/hr.     /83 (BP Location: Left arm)   Pulse 95   Temp 98.2  F (36.8  C) (Oral)   Resp 18   Ht 1.651 m (5' 5\")   Wt 108 kg (238 lb 1.6 oz)   SpO2 95%   BMI 39.62 kg/m         Please review provider order for any additional goals.   Nurse to notify provider when observation goals have been met and patient is ready for discharge.  "

## 2019-06-15 NOTE — PROGRESS NOTES
[facility]    Medicine Progress Note - Hospitalist Service       Date of Admission:  6/14/2019  Assessment & Plan   Berna Lee is a 59 year old female admitted on 6/14/2019 with left flank pain and found to have ureterolithiasis with a 5 mm stone in the distal left ureter.     1.  Ureterolithiasis.  Urology consult.  Pain medications as needed.  Remain N.p.o. for possible procedure today.  Continuous IV fluids.  Tamsulosin 0.4 mg daily.     2.  Hypertension.  Likely related to pain.  Pain medications as needed.  IV hydralazine if needed.     3.  Anemia. Discussed with patient. Her hgb has dropped from 11.9 to the 9-range over the course of 1 year. Had a normal colonoscopy in 2013. She reports noticing fatigue and decreased exercise tolerance (fatigue after going up a set of stairs) but no chest pain or SOB. She denies melena, BRBPR, vaginal bleeding, or any other active bleeding symptoms. Anemia needs to be further worked up in primary care setting.     Diet: NPO for Medical/Clinical Reasons Except for: No Exceptions    DVT Prophylaxis: Low Risk/Ambulatory with no VTE prophylaxis indicated  Ashley Catheter: not present  Code Status: Full Code      Disposition Plan   Expected discharge: Today, recommended to prior living arrangement once adequate pain management/ tolerating PO medications and urology consult completed.  Entered: Alicia Zamora PA-C 06/15/2019, 10:36 AM       The patient's care was discussed with the Patient.    Alicia Zamora PA-C  Hospitalist Service  [facility]    ______________________________________________________________________    Interval History   Patient reports that pain is doing a lot better today than yesterday. Has some ongoing mild nausea but no emesis since she arrived to floor. No fever. Some chills. No dysuria or gross hematuria.    Data reviewed today: I reviewed all medications, new labs and imaging results over the last 24 hours. I  personally reviewed:    Results for orders placed or performed during the hospital encounter of 06/14/19 (from the past 24 hour(s))   CBC (platelets, no diff)   Result Value Ref Range    WBC 13.6 (H) 4.0 - 11.0 10e9/L    RBC Count 4.11 3.8 - 5.2 10e12/L    Hemoglobin 9.3 (L) 11.7 - 15.7 g/dL    Hematocrit 31.8 (L) 35.0 - 47.0 %    MCV 77 (L) 78 - 100 fl    MCH 22.6 (L) 26.5 - 33.0 pg    MCHC 29.2 (L) 31.5 - 36.5 g/dL    RDW 16.0 (H) 10.0 - 15.0 %    Platelet Count 450 150 - 450 10e9/L   Basic metabolic panel (BMP)   Result Value Ref Range    Sodium 139 133 - 144 mmol/L    Potassium 4.3 3.4 - 5.3 mmol/L    Chloride 104 94 - 109 mmol/L    Carbon Dioxide 28 20 - 32 mmol/L    Anion Gap 6 3 - 14 mmol/L    Glucose 121 (H) 70 - 99 mg/dL    Urea Nitrogen 16 7 - 30 mg/dL    Creatinine 0.68 0.52 - 1.04 mg/dL    GFR Estimate >90 >60 mL/min/[1.73_m2]    GFR Estimate If Black >90 >60 mL/min/[1.73_m2]    Calcium 8.8 8.5 - 10.1 mg/dL   XR KUB    Narrative    XR KUB 6/14/2019 4:03 PM    HISTORY: Pain.    COMPARISON: None.      Impression    IMPRESSION: 4 mm calcification overlying the upper pole of the left  kidney. The bowel gas pattern is unremarkable.     ZO RICHARD MD   UA with Microscopic   Result Value Ref Range    Color Urine Light Yellow     Appearance Urine Clear     Glucose Urine Negative NEG^Negative mg/dL    Bilirubin Urine Negative NEG^Negative    Ketones Urine Trace (A) NEG^Negative mg/dL    Specific Gravity Urine 1.016 1.003 - 1.035    Blood Urine Negative NEG^Negative    pH Urine 7.5 (H) 5.0 - 7.0 pH    Protein Albumin Urine Negative NEG^Negative mg/dL    Urobilinogen mg/dL Normal 0.0 - 2.0 mg/dL    Nitrite Urine Negative NEG^Negative    Leukocyte Esterase Urine Negative NEG^Negative    Source Midstream Urine     WBC Urine 1 0 - 5 /HPF    RBC Urine 4 (H) 0 - 2 /HPF    Squamous Epithelial /HPF Urine 1 0 - 1 /HPF    Mucous Urine Present (A) NEG^Negative /LPF   Urine Culture   Result Value Ref Range    Specimen  Description Midstream Urine     Special Requests Specimen received in preservative     Culture Micro PENDING    CT Abdomen Pelvis w/o Contrast    Narrative    CT ABDOMEN AND PELVIS WITHOUT CONTRAST   6/14/2019 6:46 PM     HISTORY: Flank pain, recurrent stone disease suspected.    TECHNIQUE: No IV contrast material. Radiation dose for this scan was  reduced using automated exposure control, adjustment of the mA and/or  kV according to patient size, or iterative reconstruction technique.    COMPARISON: 6/9/2019    FINDINGS: There is moderate left hydronephrosis and hydroureter  related to a 5 mm calculus in the distal left ureter. There is left  perinephric infiltration. There is an additional nonobstructing 5 mm  calculus in the left renal collecting system. No right-sided urinary  tract calculi.    Patient has a moderate to large hiatal hernia. There is a fatty  infiltrated liver. Within the limits of an unenhanced study, no focal  abnormalities demonstrated in the liver, gallbladder, pancreas,  spleen, or adrenal glands. No enlarged abdominal or retroperitoneal  lymph nodes. No bowel obstruction, free air, or ascites. No pelvic  adenopathy, free fluid, or mass. The uterus is absent.      Impression    IMPRESSION:  1. Moderate left hydronephrosis and hydroureter related to a 5 mm  calculus in the distal left ureter. Left perinephric infiltration is  likely related to obstruction, although superimposed pyelonephritis is  not excluded.  2. Moderate to large hiatal hernia.  3. Fatty infiltration of the liver.    SHUKRI COYLE MD   Basic metabolic panel   Result Value Ref Range    Sodium 138 133 - 144 mmol/L    Potassium 3.9 3.4 - 5.3 mmol/L    Chloride 105 94 - 109 mmol/L    Carbon Dioxide 26 20 - 32 mmol/L    Anion Gap 7 3 - 14 mmol/L    Glucose 114 (H) 70 - 99 mg/dL    Urea Nitrogen 13 7 - 30 mg/dL    Creatinine 0.92 0.52 - 1.04 mg/dL    GFR Estimate 67 >60 mL/min/[1.73_m2]    GFR Estimate If Black 78 >60  mL/min/[1.73_m2]    Calcium 8.4 (L) 8.5 - 10.1 mg/dL   CBC with platelets differential   Result Value Ref Range    WBC 16.0 (H) 4.0 - 11.0 10e9/L    RBC Count 3.69 (L) 3.8 - 5.2 10e12/L    Hemoglobin 8.5 (L) 11.7 - 15.7 g/dL    Hematocrit 28.8 (L) 35.0 - 47.0 %    MCV 78 78 - 100 fl    MCH 23.0 (L) 26.5 - 33.0 pg    MCHC 29.5 (L) 31.5 - 36.5 g/dL    RDW 16.1 (H) 10.0 - 15.0 %    Platelet Count 387 150 - 450 10e9/L    Diff Method Automated Method     % Neutrophils 77.9 %    % Lymphocytes 13.4 %    % Monocytes 7.7 %    % Eosinophils 0.2 %    % Basophils 0.2 %    % Immature Granulocytes 0.6 %    Nucleated RBCs 0 0 /100    Absolute Neutrophil 12.5 (H) 1.6 - 8.3 10e9/L    Absolute Lymphocytes 2.2 0.8 - 5.3 10e9/L    Absolute Monocytes 1.2 0.0 - 1.3 10e9/L    Absolute Eosinophils 0.0 0.0 - 0.7 10e9/L    Absolute Basophils 0.0 0.0 - 0.2 10e9/L    Abs Immature Granulocytes 0.1 0 - 0.4 10e9/L    Absolute Nucleated RBC 0.0    Reticulocyte Count   Result Value Ref Range    % Retic 2.1 (H) 0.5 - 2.0 %    Absolute Retic 77.9 25 - 95 10e9/L       Physical Exam   Vital Signs: Temp: 98.2  F (36.8  C) Temp src: Oral BP: 170/83 Pulse: 95   Resp: 18 SpO2: 95 % O2 Device: None (Room air)    Weight: 238 lbs 1.6 oz  GENERAL:  Comfortable.  PSYCH: pleasant, oriented, No acute distress.  HEENT:  Atraumatic, normocephalic. PERRLA. Normal conjunctiva, normal hearing, and oropharynx is normal.  NECK:  Supple, no neck vein distention, adenopathy or bruits, normal thyroid.  HEART:  Normal S1, S2 with no murmur, no pericardial rub, gallops or S3 or S4.  LUNGS:  Clear to auscultation, normal respiratory effort. No wheezing, rales or ronchi.  GI:  Soft, no hepatosplenomegaly, normal bowel sounds. Non-tender, non distended.   EXTREMITIES:  No pedal edema, +2 pulses bilateral and equal.  SKIN:  Dry to touch, No rash, wound or ulcerations.  NEUROLOGIC:  CN 2-12 intact, BL 5/5 symmetric upper and lower extremity strength, sensation is intact with no  focal deficits.     Data   Results for orders placed or performed during the hospital encounter of 06/14/19   XR KUB    Narrative    XR KUB 6/14/2019 4:03 PM    HISTORY: Pain.    COMPARISON: None.      Impression    IMPRESSION: 4 mm calcification overlying the upper pole of the left  kidney. The bowel gas pattern is unremarkable.     ZO RICHARD MD   CT Abdomen Pelvis w/o Contrast    Narrative    CT ABDOMEN AND PELVIS WITHOUT CONTRAST   6/14/2019 6:46 PM     HISTORY: Flank pain, recurrent stone disease suspected.    TECHNIQUE: No IV contrast material. Radiation dose for this scan was  reduced using automated exposure control, adjustment of the mA and/or  kV according to patient size, or iterative reconstruction technique.    COMPARISON: 6/9/2019    FINDINGS: There is moderate left hydronephrosis and hydroureter  related to a 5 mm calculus in the distal left ureter. There is left  perinephric infiltration. There is an additional nonobstructing 5 mm  calculus in the left renal collecting system. No right-sided urinary  tract calculi.    Patient has a moderate to large hiatal hernia. There is a fatty  infiltrated liver. Within the limits of an unenhanced study, no focal  abnormalities demonstrated in the liver, gallbladder, pancreas,  spleen, or adrenal glands. No enlarged abdominal or retroperitoneal  lymph nodes. No bowel obstruction, free air, or ascites. No pelvic  adenopathy, free fluid, or mass. The uterus is absent.      Impression    IMPRESSION:  1. Moderate left hydronephrosis and hydroureter related to a 5 mm  calculus in the distal left ureter. Left perinephric infiltration is  likely related to obstruction, although superimposed pyelonephritis is  not excluded.  2. Moderate to large hiatal hernia.  3. Fatty infiltration of the liver.    SHUKRI COYLE MD   CBC (platelets, no diff)   Result Value Ref Range    WBC 13.6 (H) 4.0 - 11.0 10e9/L    RBC Count 4.11 3.8 - 5.2 10e12/L    Hemoglobin 9.3 (L) 11.7 -  15.7 g/dL    Hematocrit 31.8 (L) 35.0 - 47.0 %    MCV 77 (L) 78 - 100 fl    MCH 22.6 (L) 26.5 - 33.0 pg    MCHC 29.2 (L) 31.5 - 36.5 g/dL    RDW 16.0 (H) 10.0 - 15.0 %    Platelet Count 450 150 - 450 10e9/L   Basic metabolic panel (BMP)   Result Value Ref Range    Sodium 139 133 - 144 mmol/L    Potassium 4.3 3.4 - 5.3 mmol/L    Chloride 104 94 - 109 mmol/L    Carbon Dioxide 28 20 - 32 mmol/L    Anion Gap 6 3 - 14 mmol/L    Glucose 121 (H) 70 - 99 mg/dL    Urea Nitrogen 16 7 - 30 mg/dL    Creatinine 0.68 0.52 - 1.04 mg/dL    GFR Estimate >90 >60 mL/min/[1.73_m2]    GFR Estimate If Black >90 >60 mL/min/[1.73_m2]    Calcium 8.8 8.5 - 10.1 mg/dL   UA with Microscopic   Result Value Ref Range    Color Urine Light Yellow     Appearance Urine Clear     Glucose Urine Negative NEG^Negative mg/dL    Bilirubin Urine Negative NEG^Negative    Ketones Urine Trace (A) NEG^Negative mg/dL    Specific Gravity Urine 1.016 1.003 - 1.035    Blood Urine Negative NEG^Negative    pH Urine 7.5 (H) 5.0 - 7.0 pH    Protein Albumin Urine Negative NEG^Negative mg/dL    Urobilinogen mg/dL Normal 0.0 - 2.0 mg/dL    Nitrite Urine Negative NEG^Negative    Leukocyte Esterase Urine Negative NEG^Negative    Source Midstream Urine     WBC Urine 1 0 - 5 /HPF    RBC Urine 4 (H) 0 - 2 /HPF    Squamous Epithelial /HPF Urine 1 0 - 1 /HPF    Mucous Urine Present (A) NEG^Negative /LPF   Basic metabolic panel   Result Value Ref Range    Sodium 138 133 - 144 mmol/L    Potassium 3.9 3.4 - 5.3 mmol/L    Chloride 105 94 - 109 mmol/L    Carbon Dioxide 26 20 - 32 mmol/L    Anion Gap 7 3 - 14 mmol/L    Glucose 114 (H) 70 - 99 mg/dL    Urea Nitrogen 13 7 - 30 mg/dL    Creatinine 0.92 0.52 - 1.04 mg/dL    GFR Estimate 67 >60 mL/min/[1.73_m2]    GFR Estimate If Black 78 >60 mL/min/[1.73_m2]    Calcium 8.4 (L) 8.5 - 10.1 mg/dL   CBC with platelets differential   Result Value Ref Range    WBC 16.0 (H) 4.0 - 11.0 10e9/L    RBC Count 3.69 (L) 3.8 - 5.2 10e12/L    Hemoglobin  8.5 (L) 11.7 - 15.7 g/dL    Hematocrit 28.8 (L) 35.0 - 47.0 %    MCV 78 78 - 100 fl    MCH 23.0 (L) 26.5 - 33.0 pg    MCHC 29.5 (L) 31.5 - 36.5 g/dL    RDW 16.1 (H) 10.0 - 15.0 %    Platelet Count 387 150 - 450 10e9/L    Diff Method Automated Method     % Neutrophils 77.9 %    % Lymphocytes 13.4 %    % Monocytes 7.7 %    % Eosinophils 0.2 %    % Basophils 0.2 %    % Immature Granulocytes 0.6 %    Nucleated RBCs 0 0 /100    Absolute Neutrophil 12.5 (H) 1.6 - 8.3 10e9/L    Absolute Lymphocytes 2.2 0.8 - 5.3 10e9/L    Absolute Monocytes 1.2 0.0 - 1.3 10e9/L    Absolute Eosinophils 0.0 0.0 - 0.7 10e9/L    Absolute Basophils 0.0 0.0 - 0.2 10e9/L    Abs Immature Granulocytes 0.1 0 - 0.4 10e9/L    Absolute Nucleated RBC 0.0    Reticulocyte Count   Result Value Ref Range    % Retic 2.1 (H) 0.5 - 2.0 %    Absolute Retic 77.9 25 - 95 10e9/L   Urine Culture   Result Value Ref Range    Specimen Description Midstream Urine     Special Requests Specimen received in preservative     Culture Micro PENDING

## 2019-06-15 NOTE — PLAN OF CARE
ROOM # 208-2    Living Situation (if not independent, order SW consult): Home with    Facility name: n/a   : Saleem spouse [phone]    Activity level at baseline: Ind  Activity level on admit: SBA      Patient registered to observation; given Patient Bill of Rights; given the opportunity to ask questions about observation status and their plan of care.  Patient has been oriented to the observation room, bathroom and call light is in place.    Discussed discharge goals and expectations with patient/family.

## 2019-06-15 NOTE — PLAN OF CARE
Patient's After Visit Summary was reviewed with patient and/or spouse.   Patient verbalized understanding of After Visit Summary, recommended follow up and was given an opportunity to ask questions.   Discharge medications sent home with patient/family: YES, Pt sent home with Oxycodone and Zofran. Pt educated on when to take medication next.     Discharged with spouse    OBSERVATION patient END time: 1815

## 2019-06-15 NOTE — PLAN OF CARE
PRIMARY DIAGNOSIS: ACUTE RENAL COLIC    OUTPATIENT/OBSERVATION GOALS TO BE MET BEFORE DISCHARGE  1. Pain Status: Improved-controlled with oral pain medications.    2. Tolerating adequate PO diet: NPO     3. Surgical Intervention planned: Not yet     4. Cleared by consultants (if involved): No    5. Return to near baseline physical activity: Yes    Discharge Planner Nurse   Safe discharge environment identified: Yes  Barriers to discharge: Yes       Entered by: Yanni Melendez 06/15/2019 5:19 AM     VSS. NPO at midnight. IVF infusing. Pt up independent, straining urine. Receiving oxycodone and IV toradol for L flank pain. Zofran given for nausea. Urology consult in AM. Will continue to monitor.  Please review provider order for any additional goals.   Nurse to notify provider when observation goals have been met and patient is ready for discharge.

## 2019-06-16 NOTE — DISCHARGE SUMMARY
[facility] Outpatient / Observation Unit  Discharge Summary        Berna Lee MRN# [id number]   YOB: 1960 Age: 59 year old     Date of Admission:  6/14/2019  Date of Discharge:  6/15/2019  7:31 PM  Admitting Physician:  Marcos Santos DO  Discharge Physician: Alicia Zamora PA-C  Discharging Service: Hospitalist      Primary Provider: Bing Fung  Primary Care Physician Phone Number: [phone]         Primary Discharge Diagnoses:    Berna Lee was admitted on 6/14/2019 for acute renal colic.     1. Acute renal colic-sxs improved. Stone not passed. Urology consulted and discussed treatment options with patient. Patient chose to proceed with trial of stone passage at home. She was supplied with scripts for oxycodone, zofran, and flomax and recommended to continuing straining urine at home. She will be placed on the outpatient schedule Thursday 6/20 for flex ureteroscopy w/ Dr. Reid.          Secondary Discharge Diagnoses:     Past Medical History:   Diagnosis Date     Allergic state      Depressive disorder 2003 - 2004    treated with Celexa     History of blood transfusion     prior to hyst     Kidney stones 1989, 2007,1998,2003,2007     Nephrolithiasis 3/2/2015     PONV (postoperative nausea and vomiting)     n/v postop                Code Status:      Full Code        Brief Hospital Summary:        Reason for your hospital stay      You are evaluated in the hospital for pain related to a kidney stone.    You were registered to observation for assistance with symptom control.    Urology was consulted and discussed the treatment options with you.  You   preferred to try passing the stone at home rather than undergo a procedure   at this time.  Urology will contact plan to follow up with you in the   outpatient setting.  We also discussed that you have anemia compared to   your hemoglobin level last year.  Your hemoglobin is not low enough to   need a transfusion, but you  should undergo outpatient work-up for this   with your primary care doctor.  Make an appointment to see them within the   next week for hospital follow-up and to address what the first steps of   work-up for your anemia should be.             Please refer to initial admission history and physical for further details.   Briefly, Berna Lee was admitted on 6/14/2019 with acute renal colic.   Initial work up in the ED did not reveal evidence of grossly infected stone or significant renal failure/ATN to require inpatient hospitalization. Pt was registered to the Observation Unit for pain control and supportive measures.     Pt was resuscitated with IVF, and anti-emetics. Urine was strained and stone likely not passed.   Urology consult was obtained.   Pain control was transitioned from IV to PO form. Labs reviewed and significant results addressed. On the day of discharge, pt's pain was controlled and was able to tolerate PO intake.  Medications were reviewed and adjustments made as necessary. Pt is instructed to follow up as below.               Significant Labs & Imaging During Hospitalization:        Results for orders placed or performed during the hospital encounter of 06/14/19   XR KUB    Narrative    XR KUB 6/14/2019 4:03 PM    HISTORY: Pain.    COMPARISON: None.      Impression    IMPRESSION: 4 mm calcification overlying the upper pole of the left  kidney. The bowel gas pattern is unremarkable.     ZO RICHARD MD   CT Abdomen Pelvis w/o Contrast    Narrative    CT ABDOMEN AND PELVIS WITHOUT CONTRAST   6/14/2019 6:46 PM     HISTORY: Flank pain, recurrent stone disease suspected.    TECHNIQUE: No IV contrast material. Radiation dose for this scan was  reduced using automated exposure control, adjustment of the mA and/or  kV according to patient size, or iterative reconstruction technique.    COMPARISON: 6/9/2019    FINDINGS: There is moderate left hydronephrosis and hydroureter  related to a 5 mm  calculus in the distal left ureter. There is left  perinephric infiltration. There is an additional nonobstructing 5 mm  calculus in the left renal collecting system. No right-sided urinary  tract calculi.    Patient has a moderate to large hiatal hernia. There is a fatty  infiltrated liver. Within the limits of an unenhanced study, no focal  abnormalities demonstrated in the liver, gallbladder, pancreas,  spleen, or adrenal glands. No enlarged abdominal or retroperitoneal  lymph nodes. No bowel obstruction, free air, or ascites. No pelvic  adenopathy, free fluid, or mass. The uterus is absent.      Impression    IMPRESSION:  1. Moderate left hydronephrosis and hydroureter related to a 5 mm  calculus in the distal left ureter. Left perinephric infiltration is  likely related to obstruction, although superimposed pyelonephritis is  not excluded.  2. Moderate to large hiatal hernia.  3. Fatty infiltration of the liver.    SHUKRI COYLE MD   CBC (platelets, no diff)   Result Value Ref Range    WBC 13.6 (H) 4.0 - 11.0 10e9/L    RBC Count 4.11 3.8 - 5.2 10e12/L    Hemoglobin 9.3 (L) 11.7 - 15.7 g/dL    Hematocrit 31.8 (L) 35.0 - 47.0 %    MCV 77 (L) 78 - 100 fl    MCH 22.6 (L) 26.5 - 33.0 pg    MCHC 29.2 (L) 31.5 - 36.5 g/dL    RDW 16.0 (H) 10.0 - 15.0 %    Platelet Count 450 150 - 450 10e9/L   Basic metabolic panel (BMP)   Result Value Ref Range    Sodium 139 133 - 144 mmol/L    Potassium 4.3 3.4 - 5.3 mmol/L    Chloride 104 94 - 109 mmol/L    Carbon Dioxide 28 20 - 32 mmol/L    Anion Gap 6 3 - 14 mmol/L    Glucose 121 (H) 70 - 99 mg/dL    Urea Nitrogen 16 7 - 30 mg/dL    Creatinine 0.68 0.52 - 1.04 mg/dL    GFR Estimate >90 >60 mL/min/[1.73_m2]    GFR Estimate If Black >90 >60 mL/min/[1.73_m2]    Calcium 8.8 8.5 - 10.1 mg/dL   UA with Microscopic   Result Value Ref Range    Color Urine Light Yellow     Appearance Urine Clear     Glucose Urine Negative NEG^Negative mg/dL    Bilirubin Urine Negative NEG^Negative     Ketones Urine Trace (A) NEG^Negative mg/dL    Specific Gravity Urine 1.016 1.003 - 1.035    Blood Urine Negative NEG^Negative    pH Urine 7.5 (H) 5.0 - 7.0 pH    Protein Albumin Urine Negative NEG^Negative mg/dL    Urobilinogen mg/dL Normal 0.0 - 2.0 mg/dL    Nitrite Urine Negative NEG^Negative    Leukocyte Esterase Urine Negative NEG^Negative    Source Midstream Urine     WBC Urine 1 0 - 5 /HPF    RBC Urine 4 (H) 0 - 2 /HPF    Squamous Epithelial /HPF Urine 1 0 - 1 /HPF    Mucous Urine Present (A) NEG^Negative /LPF   Basic metabolic panel   Result Value Ref Range    Sodium 138 133 - 144 mmol/L    Potassium 3.9 3.4 - 5.3 mmol/L    Chloride 105 94 - 109 mmol/L    Carbon Dioxide 26 20 - 32 mmol/L    Anion Gap 7 3 - 14 mmol/L    Glucose 114 (H) 70 - 99 mg/dL    Urea Nitrogen 13 7 - 30 mg/dL    Creatinine 0.92 0.52 - 1.04 mg/dL    GFR Estimate 67 >60 mL/min/[1.73_m2]    GFR Estimate If Black 78 >60 mL/min/[1.73_m2]    Calcium 8.4 (L) 8.5 - 10.1 mg/dL   CBC with platelets differential   Result Value Ref Range    WBC 16.0 (H) 4.0 - 11.0 10e9/L    RBC Count 3.69 (L) 3.8 - 5.2 10e12/L    Hemoglobin 8.5 (L) 11.7 - 15.7 g/dL    Hematocrit 28.8 (L) 35.0 - 47.0 %    MCV 78 78 - 100 fl    MCH 23.0 (L) 26.5 - 33.0 pg    MCHC 29.5 (L) 31.5 - 36.5 g/dL    RDW 16.1 (H) 10.0 - 15.0 %    Platelet Count 387 150 - 450 10e9/L    Diff Method Automated Method     % Neutrophils 77.9 %    % Lymphocytes 13.4 %    % Monocytes 7.7 %    % Eosinophils 0.2 %    % Basophils 0.2 %    % Immature Granulocytes 0.6 %    Nucleated RBCs 0 0 /100    Absolute Neutrophil 12.5 (H) 1.6 - 8.3 10e9/L    Absolute Lymphocytes 2.2 0.8 - 5.3 10e9/L    Absolute Monocytes 1.2 0.0 - 1.3 10e9/L    Absolute Eosinophils 0.0 0.0 - 0.7 10e9/L    Absolute Basophils 0.0 0.0 - 0.2 10e9/L    Abs Immature Granulocytes 0.1 0 - 0.4 10e9/L    Absolute Nucleated RBC 0.0    Reticulocyte Count   Result Value Ref Range    % Retic 2.1 (H) 0.5 - 2.0 %    Absolute Retic 77.9 25 - 95  10e9/L   Urology IP Consult: nephrolithiasis; Consultant may enter orders: Yes; Patient to be seen: Routine - within 24 hours; Requested Clinic/Group: [facility]; Requesting provider? Hospitalist (if different from attending physician)    Delfino Puckett MD     6/15/2019  3:24 PM  Urology Consult    Name:  Berna Lee  MRN:  [id number]  Age/: 59 year old, 1960    CC: ureteral stone    HPI: Berna Lee is a(n) 59 year old female with a   history of nephrolithiasis who has previously undergone URS with   Dr. Samson who was admitted from the ER last night with left   flank pain.  Work-up revealed a 5 mm distal ureteral stone as   well as a roughly 6 mm right renal pelvis stone.  Further work-up   was notable for a mild leukocytosis with otherwise normal serum   labs and negative urinalysis.  She has been afebrile with normal   vitals other than hypertension.    On exam today she reports improvement in her pain with   medication. Her nausea is currently controlled.     Past Medical History:  Past Medical History:   Diagnosis Date     Allergic state      Depressive disorder  -     treated with Celexa     History of blood transfusion     prior to hyst     Kidney stones , ,,,     Nephrolithiasis 3/2/2015     PONV (postoperative nausea and vomiting)     n/v postop       Past Surgical History:  Past Surgical History:   Procedure Laterality Date     ABDOMEN SURGERY  1996    Abdominal Hysterectomy     BIOPSY  2013    mole on back     COLONOSCOPY  2013 Dr Sow [facility]     COLONOSCOPY  2013    Procedure: COLONOSCOPY;  Colonoscopy;  Surgeon: Nikita Sow MD;  Location:  GI     COMBINED CYSTOSCOPY, RETROGRADES, URETEROSCOPY, LASER HOLMIUM   LITHOTRIPSY URETER(S), INSERT STENT Right 3/3/2015    Procedure: COMBINED CYSTOSCOPY, RETROGRADES, URETEROSCOPY, LASER   HOLMIUM LITHOTRIPSY URETER(S), INSERT STENT;  Surgeon: Chapin    Carlos DENIS MD;  Location: [facility] OR     ENT SURGERY  1964    T&A as a child     GENITOURINARY SURGERY  3/3/15 Dr Samson    Kidney Stone Removed - Cystoscopy  Right side     HEAD & NECK SURGERY  1975    wisdom teeth removed     HYSTERECTOMY, PAP NO LONGER INDICATED       ORTHOPEDIC SURGERY  May 2015    Broken ankle - left     SOFT TISSUE SURGERY  1982    left wrist ganglion cyst     SOFT TISSUE SURGERY  1981    bilateral ingrown toenail removal surgery       Allergies:     Allergies   Allergen Reactions     Augmentin Rash     Codeine Sulfate Rash     Ivp Dye [Contrast Dye] Rash     Sulfa Drugs Rash     Tetracyclines Rash       Medications:    No current facility-administered medications on file prior to   encounter.   Current Outpatient Medications on File Prior to Encounter:  Fexofenadine HCl (ALLEGRA ALLERGY PO) Take 180 mg by mouth At   Bedtime    ibuprofen (ADVIL/MOTRIN) 200 MG tablet Take 200-400 mg by mouth   nightly as needed for mild pain With food/snack   Nasal Dilators (BREATHE RIGHT)    PROBIOTIC PRODUCT PO Take 1 capsule by mouth daily    econazole nitrate 1 % cream Apply topically daily (Patient not   taking: Reported on 6/14/2019)       Social History:  Social History     Socioeconomic History     Marital status:      Spouse name: Not on file     Number of children: Not on file     Years of education: Not on file     Highest education level: Not on file   Occupational History     Not on file   Social Needs     Financial resource strain: Not on file     Food insecurity:     Worry: Not on file     Inability: Not on file     Transportation needs:     Medical: Not on file     Non-medical: Not on file   Tobacco Use     Smoking status: Passive Smoke Exposure - Never Smoker     Smokeless tobacco: Never Used     Tobacco comment: Live with a smoker   Substance and Sexual Activity     Alcohol use: Yes     Alcohol/week: 0.0 oz     Comment: occasional , social     Drug use: No     Sexual activity: Yes      Partners: Male     Birth control/protection: Post-menopausal, None     Comment: Hysterectomy 1996   Lifestyle     Physical activity:     Days per week: Not on file     Minutes per session: Not on file     Stress: Not on file   Relationships     Social connections:     Talks on phone: Not on file     Gets together: Not on file     Attends Sikhism service: Not on file     Active member of club or organization: Not on file     Attends meetings of clubs or organizations: Not on file     Relationship status: Not on file     Intimate partner violence:     Fear of current or ex partner: Not on file     Emotionally abused: Not on file     Physically abused: Not on file     Forced sexual activity: Not on file   Other Topics Concern     Parent/sibling w/ CABG, MI or angioplasty before 65F 55M? No   Social History Narrative     Not on file       Family History:  Family History   Problem Relation Age of Onset     Hypertension Mother      Arthritis Mother         rheumatoid arthritis     Thyroid Disease Mother      Osteoporosis Mother      Neurologic Disorder Mother      Genitourinary Problems Mother      Cerebrovascular Disease Mother      Anxiety Disorder Mother      Obesity Mother      Unknown/Adopted Mother         Rheumatoid Arthritis/ HypoThyroid     Hypertension Father      Lipids Father      Coronary Artery Disease Father      Hyperlipidemia Father      Obesity Father      Depression Maternal Grandmother      Thyroid Disease Maternal Grandmother      Gastrointestinal Disease Maternal Grandmother      Diabetes Maternal Grandfather      Genitourinary Problems Maternal Grandfather      Thyroid Disease Sister      Obesity Sister      Hypertension Sister      Hyperlipidemia Sister      Thyroid Disease Sister      Obesity Sister      Unknown/Adopted Sister         Rheumatiod Arthritis/HypoThyroid     Obesity Paternal Grandmother      Unknown/Adopted Son         Sarcoidosis/Brain       ROS:  The remainder of the complete ROS  "was negative unless noted in   the HPI.    Exam:  /71 (BP Location: Left arm)   Pulse 90   Temp 98  F (36.7    C) (Oral)   Resp 18   Ht 1.651 m (5' 5\")   Wt 108 kg (238 lb   1.6 oz)   SpO2 98%   BMI 39.62 kg/m    General: Alert, interactive, & in NAD  Abdomen: Soft, nontender, nondistended.    Labs:  WBC 16, Hgb 8.5 (chronic anemia)  Cr 0.92  UA with negative LE, negative nitrite, 4 RBC, 1 WBC    Imagin19 CT reviewed. 5mm left distal ureteral stone with   5mm left renal pelvis stone.     Assessment and Plan: Berna Lee is a(n) 59 year old   female with symptomatic left ureteral stone.    -I discussed options with the patient and her  including   stent placement today for pain control versus outpatient   management with flexible ureteroscopy to clear all of her stone   burden versus combined semirigid ureteroscopy and as well for her   ureteral and renal pelvis stones.  She prefers to proceed with   flexible ureteroscopy to attempt to clear all of her stone burden   in one procedure.  We did discuss that roughly 10% of the time   possible ureteroscopy is not successful on the first attempt due   to narrowed ureteral size.  -Okay to discharge home today from a urology standpoint.  Please   discharge with pain and nausea medication; no need for   antibiotics.    -We will schedule her on  for flexible ureteroscopy   with Dr. Reid.  Our clinic will contact her early next week   with further details.    Discussed with Dr. Blankenship.     Delfino Garg MD  Urology PGY5  Pager [phone]     Urine Culture   Result Value Ref Range    Specimen Description Midstream Urine     Special Requests Specimen received in preservative     Culture Micro Culture in progress            Pending Results:        Unresulted Labs Ordered in the Past 30 Days of this Admission     Date and Time Order Name Status Description    6/15/2019 0003 Blood Morphology Pathologist Review In process     " 6/14/2019 1504 Urine Culture Preliminary               Consultations This Hospital Stay:      Consultation during this admission received from urology         Discharge Instructions and Follow-Up:      Follow-up Appointments     Follow-up and recommended labs and tests       Follow up with primary care provider, Bing Fung, within 7 days   for hospital follow- up.  Get a CBC with differential checked at that   appointment.  Follow up with urology next week.           Pt instructed to follow up with PCP in 7 days and with Urologist if indicated.   Continue to strain urine if stone not passed.   Follow-up Labs None        Discharge Disposition:      Discharged to home         Discharge Medications:        Discharge Medication List as of 6/15/2019  5:58 PM      CONTINUE these medications which have CHANGED    Details   ondansetron (ZOFRAN ODT) 4 MG ODT tab Take 1 tablet (4 mg) by mouth every 8 hours as needed for nausea, Disp-10 tablet, R-0, E-Prescribe      oxyCODONE (ROXICODONE) 5 MG tablet Take 1-2 tablets (5-10 mg) by mouth every 4 hours as needed for pain, Disp-20 tablet, R-0, Local Print      tamsulosin (FLOMAX) 0.4 MG capsule Take 1 capsule (0.4 mg) by mouth daily, Disp-10 capsule, R-0, E-Prescribe         CONTINUE these medications which have NOT CHANGED    Details   Fexofenadine HCl (ALLEGRA ALLERGY PO) Take 180 mg by mouth At Bedtime , Historical      ibuprofen (ADVIL/MOTRIN) 200 MG tablet Take 200-400 mg by mouth nightly as needed for mild pain With food/snack, Historical      Nasal Dilators (BREATHE RIGHT) Historical      PROBIOTIC PRODUCT PO Take 1 capsule by mouth daily , Historical      econazole nitrate 1 % cream Apply topically dailyDisp-85 g, [id number]                 Allergies:         Allergies   Allergen Reactions     Augmentin Rash     Codeine Sulfate Rash     Ivp Dye [Contrast Dye] Rash     Sulfa Drugs Rash     Tetracyclines Rash           Condition and Physical on Discharge:     "  Discharge condition: Stable   Vitals: Blood pressure 145/68, pulse 88, temperature 98.4  F (36.9  C), temperature source Oral, resp. rate 18, height 1.651 m (5' 5\"), weight 108 kg (238 lb 1.6 oz), SpO2 97 %, not currently breastfeeding.  238 lbs 1.6 oz      GENERAL:  Comfortable.  PSYCH: pleasant, oriented, No acute distress.  HEENT:  PERRLA. Normal conjunctiva, normal hearing, nasal mucosa and Oropharynx are normal.  NECK:  Supple, no neck vein distention, adenopathy or bruits, normal thyroid.  HEART:  Normal S1, S2 with no murmur, no pericardial rub, gallops or S3 or S4.  LUNGS:  Clear to auscultation, normal Respiratory effort. No wheezing, rales or ronchi.  ABDOMEN:  Soft, no hepatosplenomegaly, normal bowel sounds. Non-tender, non distended.   EXTREMITIES:  No pedal edema, +2 pulses bilateral and equal.  SKIN:  Dry to touch, No rash, wound or ulcerations.  NEUROLOGIC:  CN 2-12 intact, BL 5/5 symmetric upper and lower extremity strength, sensation is intact with no focal deficits.     "

## 2019-06-17 ENCOUNTER — TELEPHONE (OUTPATIENT)
Dept: PEDIATRICS | Facility: CLINIC | Age: 59
End: 2019-06-17

## 2019-06-17 DIAGNOSIS — N20.0 NEPHROLITHIASIS: Primary | ICD-10-CM

## 2019-06-17 LAB — COPATH REPORT: NORMAL

## 2019-06-17 RX ORDER — CEFAZOLIN SODIUM 1 G
1 VIAL (EA) INJECTION SEE ADMIN INSTRUCTIONS
Status: CANCELLED | OUTPATIENT
Start: 2019-06-17

## 2019-06-19 ASSESSMENT — PATIENT HEALTH QUESTIONNAIRE - PHQ9
10. IF YOU CHECKED OFF ANY PROBLEMS, HOW DIFFICULT HAVE THESE PROBLEMS MADE IT FOR YOU TO DO YOUR WORK, TAKE CARE OF THINGS AT HOME, OR GET ALONG WITH OTHER PEOPLE: SOMEWHAT DIFFICULT
SUM OF ALL RESPONSES TO PHQ QUESTIONS 1-9: 12
SUM OF ALL RESPONSES TO PHQ QUESTIONS 1-9: 12

## 2019-06-19 NOTE — OR NURSING
CBC on 6/15/19 shows Hgb 8.5, hematocrit 28.8 and RBC 3.69,  Pt is being worked up for anemia.  Dr. Reid's office notified.

## 2019-06-20 ENCOUNTER — APPOINTMENT (OUTPATIENT)
Dept: GENERAL RADIOLOGY | Facility: CLINIC | Age: 59
End: 2019-06-20
Attending: UROLOGY
Payer: COMMERCIAL

## 2019-06-20 ENCOUNTER — HOSPITAL ENCOUNTER (OUTPATIENT)
Facility: CLINIC | Age: 59
Discharge: HOME | End: 2019-06-20
Attending: UROLOGY | Admitting: UROLOGY
Payer: COMMERCIAL

## 2019-06-20 ENCOUNTER — ANESTHESIA EVENT (OUTPATIENT)
Dept: SURGERY | Facility: CLINIC | Age: 59
End: 2019-06-20
Payer: COMMERCIAL

## 2019-06-20 ENCOUNTER — ANESTHESIA (OUTPATIENT)
Dept: SURGERY | Facility: CLINIC | Age: 59
End: 2019-06-20
Payer: COMMERCIAL

## 2019-06-20 VITALS
TEMPERATURE: 97.1 F | OXYGEN SATURATION: 96 % | SYSTOLIC BLOOD PRESSURE: 146 MMHG | WEIGHT: 232 LBS | BODY MASS INDEX: 38.65 KG/M2 | HEIGHT: 65 IN | HEART RATE: 62 BPM | RESPIRATION RATE: 18 BRPM | DIASTOLIC BLOOD PRESSURE: 84 MMHG

## 2019-06-20 DIAGNOSIS — N20.0 NEPHROLITHIASIS: ICD-10-CM

## 2019-06-20 DIAGNOSIS — N20.1 URETEROLITHIASIS: Primary | ICD-10-CM

## 2019-06-20 LAB — INTERPRETATION ECG - MUSE: NORMAL

## 2019-06-20 PROCEDURE — 88300 SURGICAL PATH GROSS: CPT | Performed by: UROLOGY

## 2019-06-20 PROCEDURE — 25500064 ZZH RX 255 OP 636: Performed by: UROLOGY

## 2019-06-20 PROCEDURE — 27210794 ZZH OR GENERAL SUPPLY STERILE: Performed by: UROLOGY

## 2019-06-20 PROCEDURE — 27211027 ZZHC OR PVP LASER FIBER OPNP: Performed by: UROLOGY

## 2019-06-20 PROCEDURE — 25000128 H RX IP 250 OP 636

## 2019-06-20 PROCEDURE — C1769 GUIDE WIRE: HCPCS | Performed by: UROLOGY

## 2019-06-20 PROCEDURE — 82365 CALCULUS SPECTROSCOPY: CPT | Performed by: UROLOGY

## 2019-06-20 PROCEDURE — 36000060 ZZH SURGERY LEVEL 3 W FLUORO 1ST 30 MIN: Performed by: UROLOGY

## 2019-06-20 PROCEDURE — 25000125 ZZHC RX 250

## 2019-06-20 PROCEDURE — 52356 CYSTO/URETERO W/LITHOTRIPSY: CPT | Mod: LT | Performed by: UROLOGY

## 2019-06-20 PROCEDURE — 36000058 ZZH SURGERY LEVEL 3 EA 15 ADDTL MIN: Performed by: UROLOGY

## 2019-06-20 PROCEDURE — 25800030 ZZH RX IP 258 OP 636: Performed by: ANESTHESIOLOGY

## 2019-06-20 PROCEDURE — 71000012 ZZH RECOVERY PHASE 1 LEVEL 1 FIRST HR: Performed by: UROLOGY

## 2019-06-20 PROCEDURE — C1894 INTRO/SHEATH, NON-LASER: HCPCS | Performed by: UROLOGY

## 2019-06-20 PROCEDURE — 88300 SURGICAL PATH GROSS: CPT | Mod: 26 | Performed by: UROLOGY

## 2019-06-20 PROCEDURE — 25800025 ZZH RX 258: Performed by: UROLOGY

## 2019-06-20 PROCEDURE — 71000013 ZZH RECOVERY PHASE 1 LEVEL 1 EA ADDTL HR: Performed by: UROLOGY

## 2019-06-20 PROCEDURE — 40000306 ZZH STATISTIC PRE PROC ASSESS II: Performed by: UROLOGY

## 2019-06-20 PROCEDURE — 71000027 ZZH RECOVERY PHASE 2 EACH 15 MINS: Performed by: UROLOGY

## 2019-06-20 PROCEDURE — C2617 STENT, NON-COR, TEM W/O DEL: HCPCS | Performed by: UROLOGY

## 2019-06-20 PROCEDURE — 74420 UROGRAPHY RTRGR +-KUB: CPT | Mod: 26 | Performed by: UROLOGY

## 2019-06-20 PROCEDURE — 93010 ELECTROCARDIOGRAM REPORT: CPT | Performed by: INTERNAL MEDICINE

## 2019-06-20 PROCEDURE — 40000277 XR SURGERY CARM FLUORO LESS THAN 5 MIN W STILLS: Mod: TC

## 2019-06-20 PROCEDURE — 37000009 ZZH ANESTHESIA TECHNICAL FEE, EACH ADDTL 15 MIN: Performed by: UROLOGY

## 2019-06-20 PROCEDURE — 25000128 H RX IP 250 OP 636: Performed by: ANESTHESIOLOGY

## 2019-06-20 PROCEDURE — 25000125 ZZHC RX 250: Performed by: UROLOGY

## 2019-06-20 PROCEDURE — 37000008 ZZH ANESTHESIA TECHNICAL FEE, 1ST 30 MIN: Performed by: UROLOGY

## 2019-06-20 PROCEDURE — 25000128 H RX IP 250 OP 636: Performed by: UROLOGY

## 2019-06-20 DEVICE — STENT URETERAL POLARIS ULTRA 6FRX24CM M0061921320: Type: IMPLANTABLE DEVICE | Site: URETER | Status: FUNCTIONAL

## 2019-06-20 RX ORDER — LIDOCAINE HYDROCHLORIDE 10 MG/ML
INJECTION, SOLUTION INFILTRATION; PERINEURAL PRN
Status: DISCONTINUED | OUTPATIENT
Start: 2019-06-20 | End: 2019-06-20

## 2019-06-20 RX ORDER — ONDANSETRON 2 MG/ML
4 INJECTION INTRAMUSCULAR; INTRAVENOUS EVERY 30 MIN PRN
Status: DISCONTINUED | OUTPATIENT
Start: 2019-06-20 | End: 2019-06-20 | Stop reason: HOSPADM

## 2019-06-20 RX ORDER — HYDRALAZINE HYDROCHLORIDE 20 MG/ML
2.5-5 INJECTION INTRAMUSCULAR; INTRAVENOUS EVERY 10 MIN PRN
Status: DISCONTINUED | OUTPATIENT
Start: 2019-06-20 | End: 2019-06-20 | Stop reason: HOSPADM

## 2019-06-20 RX ORDER — HYDROMORPHONE HYDROCHLORIDE 1 MG/ML
.3-.5 INJECTION, SOLUTION INTRAMUSCULAR; INTRAVENOUS; SUBCUTANEOUS EVERY 10 MIN PRN
Status: DISCONTINUED | OUTPATIENT
Start: 2019-06-20 | End: 2019-06-20 | Stop reason: HOSPADM

## 2019-06-20 RX ORDER — OXYBUTYNIN CHLORIDE 5 MG/1
5 TABLET, EXTENDED RELEASE ORAL DAILY
Qty: 15 TABLET | Refills: 0 | Status: SHIPPED | OUTPATIENT
Start: 2019-06-20 | End: 2019-07-10

## 2019-06-20 RX ORDER — DEXAMETHASONE SODIUM PHOSPHATE 4 MG/ML
INJECTION, SOLUTION INTRA-ARTICULAR; INTRALESIONAL; INTRAMUSCULAR; INTRAVENOUS; SOFT TISSUE PRN
Status: DISCONTINUED | OUTPATIENT
Start: 2019-06-20 | End: 2019-06-20

## 2019-06-20 RX ORDER — AMOXICILLIN 250 MG
1-2 CAPSULE ORAL 2 TIMES DAILY PRN
Qty: 20 TABLET | Refills: 0 | Status: SHIPPED | OUTPATIENT
Start: 2019-06-20 | End: 2019-06-26

## 2019-06-20 RX ORDER — LIDOCAINE 40 MG/G
CREAM TOPICAL
Status: DISCONTINUED | OUTPATIENT
Start: 2019-06-20 | End: 2019-06-20 | Stop reason: HOSPADM

## 2019-06-20 RX ORDER — LABETALOL 20 MG/4 ML (5 MG/ML) INTRAVENOUS SYRINGE
10
Status: DISCONTINUED | OUTPATIENT
Start: 2019-06-20 | End: 2019-06-20 | Stop reason: HOSPADM

## 2019-06-20 RX ORDER — PROPOFOL 10 MG/ML
INJECTION, EMULSION INTRAVENOUS PRN
Status: DISCONTINUED | OUTPATIENT
Start: 2019-06-20 | End: 2019-06-20

## 2019-06-20 RX ORDER — SODIUM CHLORIDE, SODIUM LACTATE, POTASSIUM CHLORIDE, CALCIUM CHLORIDE 600; 310; 30; 20 MG/100ML; MG/100ML; MG/100ML; MG/100ML
INJECTION, SOLUTION INTRAVENOUS CONTINUOUS
Status: DISCONTINUED | OUTPATIENT
Start: 2019-06-20 | End: 2019-06-20 | Stop reason: HOSPADM

## 2019-06-20 RX ORDER — NALOXONE HYDROCHLORIDE 0.4 MG/ML
.1-.4 INJECTION, SOLUTION INTRAMUSCULAR; INTRAVENOUS; SUBCUTANEOUS
Status: DISCONTINUED | OUTPATIENT
Start: 2019-06-20 | End: 2019-06-20 | Stop reason: HOSPADM

## 2019-06-20 RX ORDER — TAMSULOSIN HYDROCHLORIDE 0.4 MG/1
0.4 CAPSULE ORAL DAILY
Qty: 7 CAPSULE | Refills: 0 | Status: SHIPPED | OUTPATIENT
Start: 2019-06-20 | End: 2019-07-10

## 2019-06-20 RX ORDER — CEFAZOLIN SODIUM 2 G/100ML
2 INJECTION, SOLUTION INTRAVENOUS
Status: COMPLETED | OUTPATIENT
Start: 2019-06-20 | End: 2019-06-20

## 2019-06-20 RX ORDER — CEFAZOLIN SODIUM 1 G/3ML
1 INJECTION, POWDER, FOR SOLUTION INTRAMUSCULAR; INTRAVENOUS SEE ADMIN INSTRUCTIONS
Status: DISCONTINUED | OUTPATIENT
Start: 2019-06-20 | End: 2019-06-20 | Stop reason: HOSPADM

## 2019-06-20 RX ORDER — FENTANYL CITRATE 50 UG/ML
25-50 INJECTION, SOLUTION INTRAMUSCULAR; INTRAVENOUS
Status: DISCONTINUED | OUTPATIENT
Start: 2019-06-20 | End: 2019-06-20 | Stop reason: HOSPADM

## 2019-06-20 RX ORDER — ONDANSETRON 2 MG/ML
INJECTION INTRAMUSCULAR; INTRAVENOUS PRN
Status: DISCONTINUED | OUTPATIENT
Start: 2019-06-20 | End: 2019-06-20

## 2019-06-20 RX ORDER — GLYCOPYRROLATE 0.2 MG/ML
INJECTION, SOLUTION INTRAMUSCULAR; INTRAVENOUS PRN
Status: DISCONTINUED | OUTPATIENT
Start: 2019-06-20 | End: 2019-06-20

## 2019-06-20 RX ORDER — ONDANSETRON 4 MG/1
4 TABLET, ORALLY DISINTEGRATING ORAL EVERY 30 MIN PRN
Status: DISCONTINUED | OUTPATIENT
Start: 2019-06-20 | End: 2019-06-20 | Stop reason: HOSPADM

## 2019-06-20 RX ORDER — FENTANYL CITRATE 50 UG/ML
INJECTION, SOLUTION INTRAMUSCULAR; INTRAVENOUS PRN
Status: DISCONTINUED | OUTPATIENT
Start: 2019-06-20 | End: 2019-06-20

## 2019-06-20 RX ORDER — MEPERIDINE HYDROCHLORIDE 50 MG/ML
12.5 INJECTION INTRAMUSCULAR; INTRAVENOUS; SUBCUTANEOUS
Status: DISCONTINUED | OUTPATIENT
Start: 2019-06-20 | End: 2019-06-20 | Stop reason: HOSPADM

## 2019-06-20 RX ADMIN — FENTANYL CITRATE 50 MCG: 50 INJECTION, SOLUTION INTRAMUSCULAR; INTRAVENOUS at 12:14

## 2019-06-20 RX ADMIN — SODIUM CHLORIDE, POTASSIUM CHLORIDE, SODIUM LACTATE AND CALCIUM CHLORIDE: 600; 310; 30; 20 INJECTION, SOLUTION INTRAVENOUS at 11:22

## 2019-06-20 RX ADMIN — CEFAZOLIN SODIUM 2 G: 2 INJECTION, SOLUTION INTRAVENOUS at 12:09

## 2019-06-20 RX ADMIN — ONDANSETRON 4 MG: 2 INJECTION INTRAMUSCULAR; INTRAVENOUS at 12:33

## 2019-06-20 RX ADMIN — FENTANYL CITRATE 25 MCG: 50 INJECTION INTRAMUSCULAR; INTRAVENOUS at 14:46

## 2019-06-20 RX ADMIN — PROPOFOL 160 MG: 10 INJECTION, EMULSION INTRAVENOUS at 12:14

## 2019-06-20 RX ADMIN — HYDROMORPHONE HYDROCHLORIDE 0.5 MG: 1 INJECTION, SOLUTION INTRAMUSCULAR; INTRAVENOUS; SUBCUTANEOUS at 15:21

## 2019-06-20 RX ADMIN — GLYCOPYRROLATE 0.2 MG: 0.2 INJECTION, SOLUTION INTRAMUSCULAR; INTRAVENOUS at 12:14

## 2019-06-20 RX ADMIN — LIDOCAINE HYDROCHLORIDE 40 MG: 10 INJECTION, SOLUTION INFILTRATION; PERINEURAL at 12:14

## 2019-06-20 RX ADMIN — FENTANYL CITRATE 50 MCG: 50 INJECTION, SOLUTION INTRAMUSCULAR; INTRAVENOUS at 12:30

## 2019-06-20 RX ADMIN — ROCURONIUM BROMIDE 25 MG: 10 INJECTION INTRAVENOUS at 12:14

## 2019-06-20 RX ADMIN — MIDAZOLAM 2 MG: 1 INJECTION INTRAMUSCULAR; INTRAVENOUS at 12:13

## 2019-06-20 RX ADMIN — DEXAMETHASONE SODIUM PHOSPHATE 4 MG: 4 INJECTION, SOLUTION INTRA-ARTICULAR; INTRALESIONAL; INTRAMUSCULAR; INTRAVENOUS; SOFT TISSUE at 12:14

## 2019-06-20 RX ADMIN — SODIUM CHLORIDE, POTASSIUM CHLORIDE, SODIUM LACTATE AND CALCIUM CHLORIDE: 600; 310; 30; 20 INJECTION, SOLUTION INTRAVENOUS at 12:24

## 2019-06-20 ASSESSMENT — PATIENT HEALTH QUESTIONNAIRE - PHQ9: SUM OF ALL RESPONSES TO PHQ QUESTIONS 1-9: 12

## 2019-06-20 ASSESSMENT — MIFFLIN-ST. JEOR: SCORE: 1623.23

## 2019-06-20 NOTE — BRIEF OP NOTE
[facility] Brief Operative Note    Pre-operative diagnosis: left kidney stone   Post-operative diagnosis Same   Procedure: Procedure(s):  cystoscopy, left ureteroscopy holmium laser lithotripsy and stone extraction, left ureteral stent placement, left retrograde pyelogram   Surgeon: Willis Reid MD   Assistants(s): none   Estimated blood loss: Minimal    Specimens: ID Type Source Tests Collected by Time Destination   1 : Left Ureteral Stone Calculus/Stone Ureter, Left STONE ANALYSIS Willis Reid MD 6/20/2019 12:48 PM         Findings: Ureteral stone and upper pole stone fragmented and removed. Stone free at end of case.    Willis Reid MD  Urology  [facility]  Clinic Phone [phone]

## 2019-06-20 NOTE — ANESTHESIA PREPROCEDURE EVALUATION
Anesthesia Pre-Procedure Evaluation    Patient: Berna Lee   MRN: [id number] : 1960          Preoperative Diagnosis: left kidney stone    Procedure(s):  cystoscopy left ureteroscopy holmium laser lithotripsy left stent placement    Past Medical History:   Diagnosis Date     Allergic state      Depressive disorder  -     treated with Celexa     History of blood transfusion     prior to hyst     Kidney stones , ,,,     Nephrolithiasis 3/2/2015     PONV (postoperative nausea and vomiting)     n/v postop     Past Surgical History:   Procedure Laterality Date     ABDOMEN SURGERY  1996    Abdominal Hysterectomy     BIOPSY  2013    mole on back     COLONOSCOPY  2013 Dr Sow [facility]     COLONOSCOPY  2013    Procedure: COLONOSCOPY;  Colonoscopy;  Surgeon: Nikita Sow MD;  Location:  GI     COMBINED CYSTOSCOPY, RETROGRADES, URETEROSCOPY, LASER HOLMIUM LITHOTRIPSY URETER(S), INSERT STENT Right 3/3/2015    Procedure: COMBINED CYSTOSCOPY, RETROGRADES, URETEROSCOPY, LASER HOLMIUM LITHOTRIPSY URETER(S), INSERT STENT;  Surgeon: Carlos Samson MD;  Location: [facility] OR     ENT SURGERY  1964    T&A as a child     GENITOURINARY SURGERY  3/3/15 Dr Samson    Kidney Stone Removed - Cystoscopy  Right side     HEAD & NECK SURGERY      wisdom teeth removed     HYSTERECTOMY, PAP NO LONGER INDICATED       ORTHOPEDIC SURGERY  May 2015    Broken ankle - left     SOFT TISSUE SURGERY      left wrist ganglion cyst     SOFT TISSUE SURGERY      bilateral ingrown toenail removal surgery     Anesthesia Evaluation     . Pt has had prior anesthetic.     History of anesthetic complications   - PONV        ROS/MED HX    ENT/Pulmonary:       Neurologic:  - neg neurologic ROS     Cardiovascular:     (+) hypertension----. : . . . :. .       METS/Exercise Tolerance:     Hematologic:     (+) Anemia, -      Musculoskeletal:         GI/Hepatic:  - neg GI/hepatic ROS      "  Renal/Genitourinary:     (+) Nephrolithiasis ,       Endo:     (+) Obesity, .      Psychiatric:     (+) psychiatric history depression      Infectious Disease:  - neg infectious disease ROS       Malignancy:         Other:                          Physical Exam  Normal systems: cardiovascular and pulmonary    Airway   Mallampati: II  TM distance: >3 FB  Neck ROM: full    Dental     Cardiovascular       Pulmonary             Lab Results   Component Value Date    WBC 16.0 (H) 06/15/2019    HGB 8.5 (L) 06/15/2019    HCT 28.8 (L) 06/15/2019     06/15/2019    SED 28 06/26/2018     06/15/2019    POTASSIUM 3.9 06/15/2019    CHLORIDE 105 06/15/2019    CO2 26 06/15/2019    BUN 13 06/15/2019    CR 0.92 06/15/2019     (H) 06/15/2019    JULEE 8.4 (L) 06/15/2019    ALBUMIN 3.5 06/09/2019    PROTTOTAL 7.4 06/09/2019    ALT 20 06/09/2019    AST 14 06/09/2019    ALKPHOS 119 06/09/2019    BILITOTAL 0.3 06/09/2019    LIPASE 89 06/09/2019    TSH 2.03 06/26/2018       Preop Vitals  BP Readings from Last 3 Encounters:   06/15/19 145/68   06/09/19 134/78   06/26/18 103/72    Pulse Readings from Last 3 Encounters:   06/15/19 88   06/09/19 76   06/26/18 84      Resp Readings from Last 3 Encounters:   06/15/19 18   06/09/19 20   08/21/16 12    SpO2 Readings from Last 3 Encounters:   06/15/19 97%   06/09/19 98%   06/26/18 98%      Temp Readings from Last 1 Encounters:   06/15/19 98.4  F (36.9  C) (Oral)    Ht Readings from Last 1 Encounters:   06/14/19 1.651 m (5' 5\")      Wt Readings from Last 1 Encounters:   06/14/19 108 kg (238 lb 1.6 oz)    Estimated body mass index is 39.62 kg/m  as calculated from the following:    Height as of 6/14/19: 1.651 m (5' 5\").    Weight as of 6/14/19: 108 kg (238 lb 1.6 oz).       Anesthesia Plan      History & Physical Review  History and physical reviewed and following examination; no interval change.    ASA Status:  2 .    NPO Status:  > 8 hours    Plan for General and ETT with " Intravenous and Propofol induction. Maintenance will be Balanced.    PONV prophylaxis:  Ondansetron (or other 5HT-3) and Dexamethasone or Solumedrol       Postoperative Care  Postoperative pain management:  IV analgesics.      Consents  Anesthetic plan, risks, benefits and alternatives discussed with:  Patient.  Use of blood products discussed: Yes.   Use of blood products discussed with Patient.  Consented to blood products.  .                 Joshua Woodard MD                    .

## 2019-06-20 NOTE — OP NOTE
DATE OF SERVICE: 6/20/2019  PREOPERATIVE DIAGNOSIS: Left nephrolithiasis  POSTOPERATIVE DIAGNOSIS: Left nephrolithiasis    PROCEDURES PERFORMED:   1. Cystourethroscopy  2. Left ureteroscopy with holmium laser lithotripsy and stone extraction  3. Left retrograde pyelography with interpretation of intraoperative fluoroscopic imaging  4. Left ureteral stent placement    STAFF SURGEON: Willis Reid MD  ANESTHESIA: General  ESTIMATED BLOOD LOSS: 1 cc  DRAINS/TUBES: Left 6 Portuguese x 24 cm double-J ureteral stent   COMPLICATIONS: None.   SPECIMEN: Stone for analysis  SIGNIFICANT FINDINGS: 5mm distal ureteral stone fragmented and removed. Stone in upper pole of kidney identified, grasped and removed. Stone free at end of case.    BRIEF OPERATIVE INDICATIONS: Berna Lee is a 59 year old female who recently presented with left ureteral calculi. After a discussion of all risks, benefits, and alternatives, the patient elected to proceed with definitive stone management. The patient understands the potential need for more than one procedure to eliminate all stone burden.      DESCRIPTION OF PROCEDURE:  After informed consent was verified, the patient was transported to the operating room, placed supine on the table. After adequate anesthesia was induced, she was placed in dorsal lithotomy and prepped and draped in the usual sterile fashion. A timeout was taken to confirm correct patient, procedure and laterality. Pre-operative IV antibiotics were administered.    A 22 Portuguese rigid cystoscope was inserted per a well-lubricated urethra. The anterior urethra was unremarkable. The ureteral orifices were orthotopic bilaterally. The left ureteral orifice was identified and cannulated with a Sensor wire and 6-Fr open-ended catheter. The wire passed without resistance into the upper pole.  A retrograde pyelogram showed a distal ureteral stone with mild proximal hydronephrosis but no other filling defects.  A semirigid  ureteroscope was advanced under direct vision alongside the safety wire to the level of the stone. A 5 mm stone was noted in the left ureter. A 200 micron laser fiber at settings of 0.6 J and 6 Hz was used to fragment the stone. All stones >1mm were basketed and withdrawn. With the ureteral stone removed, a superstiff wire and ureteral access sheath were placed. A flexible cystoscope was advanced into the left renal pelvis and the 5 mm stone noted on imaging was identified in the upper pole. This was grasped and removed intact. Complete evaluation of the calyceal system demonstrated no further stones. Pullback ureteroscopy was performed and showed no retained stone fragments or ureteral injury. A 6 Fr x 24 cm double-J stent was advanced over the Sensor wire, and a good proximal curl was seen in the renal pelvis and the distal curl was seen in the bladder. The bladder was drained.   The patient tolerated the procedure well.  No apparent complications. She was transported to the postanesthesia care unit in stable condition.      PLAN: Return in 1-2 weeks for cystoscopy and ureteral stent removal in the office.    Willis Reid MD  Urology  [facility]  Clinic Phone [phone]

## 2019-06-20 NOTE — ANESTHESIA POSTPROCEDURE EVALUATION
Patient: Berna Lee    Procedure(s):  cystoscopy, left ureteroscopy holmium laser lithotripsy and stone extraction, left ureteral stent placement, left retrograde pyelogram    Diagnosis:left kidney stone  Diagnosis Additional Information: No value filed.    Anesthesia Type:  General, ETT    Note:  Anesthesia Post Evaluation    Patient location during evaluation: PACU  Patient participation: Able to fully participate in evaluation  Level of consciousness: awake  Pain management: adequate  Airway patency: patent  Cardiovascular status: acceptable  Respiratory status: acceptable  Hydration status: acceptable  PONV: none     Anesthetic complications: None          Last vitals:  Vitals:    06/20/19 1500 06/20/19 1521 06/20/19 1536   BP: 134/77  148/86   Pulse:   79   Resp: 24 18 20   Temp: 97.4  F (36.3  C)  97.1  F (36.2  C)   SpO2: 96%  98%         Electronically Signed By: Joshua Woodard MD  June 20, 2019  4:00 PM

## 2019-06-20 NOTE — ANESTHESIA CARE TRANSFER NOTE
Patient: Berna Lee    Procedure(s):  cystoscopy, left ureteroscopy holmium laser lithotripsy and stone extraction, left ureteral stent placement, left retrograde pyelogram    Diagnosis: left kidney stone  Diagnosis Additional Information: No value filed.    Anesthesia Type:   General, ETT     Note:  Airway :Face Mask  Patient transferred to:PACU  Comments: Pt to PACU, VSS, rport to RNHandoff Report: Identifed the Patient, Identified the Reponsible Provider, Reviewed the pertinent medical history, Discussed the surgical course, Reviewed Intra-OP anesthesia mangement and issues during anesthesia, Set expectations for post-procedure period and Allowed opportunity for questions and acknowledgement of understanding      Vitals: (Last set prior to Anesthesia Care Transfer)    CRNA VITALS  6/20/2019 1317 - 6/20/2019 1353      6/20/2019             Pulse:  98    SpO2:  99 %    Resp Rate (observed):  13                Electronically Signed By: PALLAVI Burkett CRNA  June 20, 2019  1:53 PM

## 2019-06-20 NOTE — DISCHARGE INSTRUCTIONS
GENERAL ANESTHESIA OR SEDATION ADULT DISCHARGE INSTRUCTIONS   SPECIAL PRECAUTIONS FOR 24 HOURS AFTER SURGERY    IT IS NOT UNUSUAL TO FEEL LIGHT-HEADED OR FAINT, UP TO 24 HOURS AFTER SURGERY OR WHILE TAKING PAIN MEDICATION.  IF YOU HAVE THESE SYMPTOMS; SIT FOR A FEW MINUTES BEFORE STANDING AND HAVE SOMEONE ASSIST YOU WHEN YOU GET UP TO WALK OR USE THE BATHROOM.    YOU SHOULD REST AND RELAX FOR THE NEXT 24 HOURS AND YOU MUST MAKE ARRANGEMENTS TO HAVE SOMEONE STAY WITH YOU FOR AT LEAST 24 HOURS AFTER YOUR DISCHARGE.  AVOID HAZARDOUS AND STRENUOUS ACTIVITIES.  DO NOT MAKE IMPORTANT DECISIONS FOR 24 HOURS.    DO NOT DRIVE ANY VEHICLE OR OPERATE MECHANICAL EQUIPMENT FOR 24 HOURS FOLLOWING THE END OF YOUR SURGERY.  EVEN THOUGH YOU MAY FEEL NORMAL, YOUR REACTIONS MAY BE AFFECTED BY THE MEDICATION YOU HAVE RECEIVED.    DO NOT DRINK ALCOHOLIC BEVERAGES FOR 24 HOURS FOLLOWING YOUR SURGERY.    DRINK CLEAR LIQUIDS (APPLE JUICE, GINGER ALE, 7-UP, BROTH, ETC.).  PROGRESS TO YOUR REGULAR DIET AS YOU FEEL ABLE.    YOU MAY HAVE A DRY MOUTH, A SORE THROAT, MUSCLES ACHES OR TROUBLE SLEEPING.  THESE SHOULD GO AWAY AFTER 24 HOURS.    CALL YOUR DOCTOR FOR ANY OF THE FOLLOWING:  SIGNS OF INFECTION (FEVER, GROWING TENDERNESS AT THE SURGERY SITE, A LARGE AMOUNT OF DRAINAGE OR BLEEDING, SEVERE PAIN, FOUL-SMELLING DRAINAGE, REDNESS OR SWELLING.    IT HAS BEEN OVER 8 TO 10 HOURS SINCE SURGERY AND YOU ARE STILL NOT ABLE TO URINATE (PASS WATER).     CYSTOSCOPY DISCHARGE INSTRUCTIONS  [facility]  [address]  [phone]    YOU MAY GO BACK TO YOUR NORMAL DIET AND ACTIVITY, UNLESS YOUR DOCTOR TELLS YOU NOT TO.    FOR THE NEXT TWO DAYS, YOU MAY NOTICE:    SOME BLOOD IN YOUR URINE.  SOME BURNING WHEN YOU URINATE.  AN URGE TO URINATE MORE OFTEN.  BLADDER SPASMS.    THESE ARE NORMAL AFTER THE PROCEDURE.  THEY SHOULD GO AWAY AFTER A DAY OR TWO.  TO RELIEVE THESE PROBLEMS:     DRINK 6 TO 8 LARGE GLASSES OF WATER EACH DAY  (INCLUDES DRINKS AT MEALS).  THIS WILL HELP CLEAR THE URINE.    TAKE WARM BATHS TO RELIEVE PAIN AND BLADDER SPASMS.  DO NOT ADD ANYTHING TO THE BATH WATER.    YOUR DOCTOR MAY PRESCRIBE PAIN MEDICINE.  YOU MAY ALSO TAKE TYLENOL (ACETAMINOPHEN) FOR PAIN.    CALL YOUR SURGEON IF YOU HAVE:    A FEVER OVER 101 DEGREES.  CHECK YOUR TEMPERATURE UNDER YOUR TONGUE.    CHILLS.    FAILURE TO URINATE (NO URINE COMES OUT WHEN YOU TRY TO USE THE TOILET).  TRY SOAKING IN A BATHTUB FULL OF WARM WATER.  IF STILL NO URINE, CALL YOUR DOCTOR.    A LOT OF BLOOD IN THE URINE, OR BLOOD CLOTS LARGER THAN A NICKEL.      PAIN IN THE BACK OR BELLY AREA (ABDOMEN).    PAIN OR SPASMS THAT ARE NOT RELIEVED BY WARM TUB BATHS AND PAIN MEDICINE.      SEVERE PAIN, BURNING OR OTHER PROBLEMS WHILE PASSING URINE.    PAIN THAT GETS WORSE AFTER TWO DAYS.        STENT INFORMATION/DISCHARGE INSTRUCTIONS  [facility]  [address]  [phone]    During surgery, a stent may be placed in the ureter.  The ureter is the tube that drains urine from the kidney to the bladder.  The stent is placed to dilate (open) the ureter so stone fragments can pass easily through the ureter or to decrease ureteral swelling after surgery or to relieve an obstruction.      The stent is made of silicone.  The upper end of the stent curls in the kidney while the lower end rests in the bladder.    While the stent is in place you may experience the following symptoms:  Blood and/or small blood clots in the urine  Bladder spasms (frequency and urgency of urination)  Discomfort or aching in the back or side where the stent is  Burning or discomfort at the end of urine stream    To decrease these symptoms you should:  Take antispasmodic medication as prescribed (Detrol, Ditropan, etc.)  Drink plenty of fluids but avoid caffeine and citrus (include cranberry)  If you are having discomfort in back or side, decrease activity    Please call your physician or  the physician on call if you experience:  Fever greater than 101 degrees  Severe pain not relieved by pain medication or rest    Please make an appointment for the removal of the stent according to your physician's instructions.

## 2019-06-21 LAB — COPATH REPORT: NORMAL

## 2019-06-23 LAB
APPEARANCE STONE: NORMAL
COMPN STONE: NORMAL
NUMBER STONE: 6
SIZE STONE: NORMAL MM
WT STONE: 54 MG

## 2019-06-26 ENCOUNTER — OFFICE VISIT (OUTPATIENT)
Dept: PEDIATRICS | Facility: CLINIC | Age: 59
End: 2019-06-26
Payer: COMMERCIAL

## 2019-06-26 VITALS
HEART RATE: 85 BPM | TEMPERATURE: 98.3 F | WEIGHT: 230.9 LBS | BODY MASS INDEX: 38.47 KG/M2 | DIASTOLIC BLOOD PRESSURE: 70 MMHG | SYSTOLIC BLOOD PRESSURE: 118 MMHG | OXYGEN SATURATION: 100 % | HEIGHT: 65 IN | RESPIRATION RATE: 16 BRPM

## 2019-06-26 DIAGNOSIS — D64.9 NORMOCYTIC ANEMIA: ICD-10-CM

## 2019-06-26 DIAGNOSIS — Z83.3 FAMILY HISTORY OF DIABETES MELLITUS: ICD-10-CM

## 2019-06-26 DIAGNOSIS — Z82.61 FAMILY HISTORY OF RHEUMATOID ARTHRITIS: ICD-10-CM

## 2019-06-26 DIAGNOSIS — M13.0 POLYARTHRITIS: ICD-10-CM

## 2019-06-26 DIAGNOSIS — N20.1 URETEROLITHIASIS: Primary | ICD-10-CM

## 2019-06-26 DIAGNOSIS — Z83.49 FAMILY HISTORY OF THYROID DISORDER: ICD-10-CM

## 2019-06-26 LAB
ERYTHROCYTE [DISTWIDTH] IN BLOOD BY AUTOMATED COUNT: 16.7 % (ref 10–15)
ERYTHROCYTE [SEDIMENTATION RATE] IN BLOOD BY WESTERGREN METHOD: 40 MM/H (ref 0–30)
HCT VFR BLD AUTO: 33.5 % (ref 35–47)
HGB BLD-MCNC: 9.8 G/DL (ref 11.7–15.7)
MCH RBC QN AUTO: 22.5 PG (ref 26.5–33)
MCHC RBC AUTO-ENTMCNC: 29.3 G/DL (ref 31.5–36.5)
MCV RBC AUTO: 77 FL (ref 78–100)
PLATELET # BLD AUTO: 500 10E9/L (ref 150–450)
RBC # BLD AUTO: 4.35 10E12/L (ref 3.8–5.2)
WBC # BLD AUTO: 10.9 10E9/L (ref 4–11)

## 2019-06-26 PROCEDURE — 86431 RHEUMATOID FACTOR QUANT: CPT | Performed by: PEDIATRICS

## 2019-06-26 PROCEDURE — 82728 ASSAY OF FERRITIN: CPT | Performed by: PEDIATRICS

## 2019-06-26 PROCEDURE — 99213 OFFICE O/P EST LOW 20 MIN: CPT | Mod: GC | Performed by: STUDENT IN AN ORGANIZED HEALTH CARE EDUCATION/TRAINING PROGRAM

## 2019-06-26 PROCEDURE — 86200 CCP ANTIBODY: CPT | Performed by: PEDIATRICS

## 2019-06-26 PROCEDURE — 83540 ASSAY OF IRON: CPT | Performed by: PEDIATRICS

## 2019-06-26 PROCEDURE — 36415 COLL VENOUS BLD VENIPUNCTURE: CPT | Performed by: PEDIATRICS

## 2019-06-26 PROCEDURE — 83550 IRON BINDING TEST: CPT | Performed by: PEDIATRICS

## 2019-06-26 PROCEDURE — 84443 ASSAY THYROID STIM HORMONE: CPT | Performed by: PEDIATRICS

## 2019-06-26 PROCEDURE — 85652 RBC SED RATE AUTOMATED: CPT | Performed by: PEDIATRICS

## 2019-06-26 PROCEDURE — 80061 LIPID PANEL: CPT | Performed by: PEDIATRICS

## 2019-06-26 PROCEDURE — 85027 COMPLETE CBC AUTOMATED: CPT | Performed by: PEDIATRICS

## 2019-06-26 ASSESSMENT — MIFFLIN-ST. JEOR: SCORE: 1618.24

## 2019-06-26 NOTE — PATIENT INSTRUCTIONS
Thanks for coming in today! Here is what we discussed:       1) anemia - lets get some blood tests today looking at your iron levels. Also, we should have gastroenterology do an endoscopy/colonoscopy to look for GI bleeding (as well as eval the hiatal hernia). They will call you to schedule.  We will send you a message on [employer] with lab results.     2) Stent/stone - keep up the good work with recovery and see urology as planned    3) See Dr. Fung in 6 weeks.

## 2019-06-26 NOTE — PROGRESS NOTES
Subjective     Berna Lee is a 59 year old female who presents to clinic today for the following health issues:    History of Present Illness        CKD:   NSAID use::  Ibuprofen (Motrin) and naproxen (Anaprox, Naprosyn, Naprelan)    Mental Health Follow-up:  Patient presents to follow-up on Depression.Patient's depression since last visit has been:  Worse  The patient is not having other symptoms associated with depression.      Any significant life events: financial concerns and health concerns  Patient is not feeling anxious or having panic attacks.  Patient has no concerns about alcohol or drug use.     Social History  Tobacco Use    Smoking status: Passive Smoke Exposure - Never Smoker    Smokeless tobacco: Never Used    Tobacco comment: Live with a smoker  Alcohol use: Yes    Alcohol/week: 0.0 oz    Comment: occasional , social  Drug use: No      Today's PHQ-9         PHQ-9 Total Score:     (P) 12   PHQ-9 Q9 Thoughts of better off dead/self-harm past 2 weeks :   (P) Not at all   Thoughts of suicide or self harm:      Self-harm Plan:        Self-harm Action:          Safety concerns for self or others:           Hypertension: She presents for follow up of hypertension.  She does not check blood pressure  regularly outside of the clinic. Outside blood pressures have been over 140/90. She does not follow a low salt diet.     Hypothyroidism:     Since last visit, patient describes the following symptoms::  Depression, Fatigue and Weight gain    Weight gain::  6-10 lbs.    She eats 0-1 servings of fruits and vegetables daily.She consumes 2 sweetened beverage(s) daily.  She is taking medications regularly.  Answers for HPI/ROS submitted by the patient on 6/19/2019   Chronic problems general questions HPI Form  If you checked off any problems, how difficult have these problems made it for you to do your work, take care of things at home, or get along with other people?: Somewhat difficult  PHQ9 TOTAL SCORE:  "12     ED/UC Followup:    Facility:  [facility]   Date of visit: 6/14-6/15/2019  Reason for visit: Anemia, kidney stones taken care of    Current Status: Patient states that she still has a stent in, kidney's feel okay, still on flomax, drinking a lot. Patient states that she feels \"winded\" going up stairs, heart rate increasing. Hemoglobin at 8.5 on 6/15, low for her as she is usually on the higher end.      Monday will get stent out. Pain is better. Doing tylenol 2 pills (unclear dose) q6h and ibuprofen in PM.   Plan for oxybutinin and flomax is until Friday.     Anemia - since hysterectomy at age 37 has been on higher end of hemoglobin. Hasn't had anemia since uterine bleeding (needed transfusion prior to hysterectomy). Diet is \"probably bad\" but declines to specify.    EtOH: 1 drink per week maxNo tobacco,  smokes.     Does also take tums prn and prilosec if she has tums a few days in a row.    Gets winded going up stairs this spring - figures it's from a very sedentary winter gaining weight. Feeling fatigued. Getting edema in her ankles and feet. Fitbit shows HR goes way high just from doing one flight of stairs. [place] Used to john. No recent activity.     Restless legs - ok, doesn't bring them up until I ask. Mucinex D makes it worse, so stopped it.   -------------------------------------    Patient Active Problem List   Diagnosis     Family history of thyroid disorder     History of dysplastic nevus     Nephrolithiasis     Family history of diabetes mellitus     Obesity     Ureterolithiasis     Past Surgical History:   Procedure Laterality Date     ABDOMEN SURGERY  Feb 1996    Abdominal Hysterectomy     BIOPSY  July 2013    mole on back     COLONOSCOPY  8/2/2013 Dr Sow [facility]     COLONOSCOPY  8/2/2013    Procedure: COLONOSCOPY;  Colonoscopy;  Surgeon: Nikita Sow MD;  Location:  GI     COMBINED CYSTOSCOPY, RETROGRADES, URETEROSCOPY, LASER " HOLMIUM LITHOTRIPSY URETER(S), INSERT STENT Right 3/3/2015    Procedure: COMBINED CYSTOSCOPY, RETROGRADES, URETEROSCOPY, LASER HOLMIUM LITHOTRIPSY URETER(S), INSERT STENT;  Surgeon: Carlos Samson MD;  Location: [facility] OR     ENT SURGERY  1964    T&A as a child     GENITOURINARY SURGERY  3/3/15 Dr Samson    Kidney Stone Removed - Cystoscopy  Right side     HEAD & NECK SURGERY  1975    wisdom teeth removed     HYSTERECTOMY, PAP NO LONGER INDICATED       LASER HOLMIUM LITHOTRIPSY URETER(S), INSERT STENT, COMBINED Left 6/20/2019    Procedure: 1. Cystourethroscopy 2. Left ureteroscopy with holmium laser lithotripsy and stone extraction 3. Left retrograde pyelography with interpretation of intraoperative fluoroscopic imaging 4. Left ureteral stent placement;  Surgeon: Willis Reid MD;  Location: [facility] OR     ORTHOPEDIC SURGERY  May 2015    Broken ankle - left     SOFT TISSUE SURGERY  1982    left wrist ganglion cyst     SOFT TISSUE SURGERY  1981    bilateral ingrown toenail removal surgery       Social History     Tobacco Use     Smoking status: Passive Smoke Exposure - Never Smoker     Smokeless tobacco: Never Used     Tobacco comment: Live with a smoker   Substance Use Topics     Alcohol use: Yes     Alcohol/week: 0.0 oz     Comment: occasional , social     Family History   Problem Relation Age of Onset     Hypertension Mother      Arthritis Mother         rheumatoid arthritis     Thyroid Disease Mother      Osteoporosis Mother      Neurologic Disorder Mother      Genitourinary Problems Mother      Cerebrovascular Disease Mother      Anxiety Disorder Mother      Obesity Mother      Unknown/Adopted Mother         Rheumatoid Arthritis/ HypoThyroid     Hypertension Father      Lipids Father      Coronary Artery Disease Father      Hyperlipidemia Father      Obesity Father      Depression Maternal Grandmother      Thyroid Disease Maternal Grandmother      Gastrointestinal Disease Maternal Grandmother       "Osteoporosis Maternal Grandmother      Diabetes Maternal Grandfather      Genitourinary Problems Maternal Grandfather      Thyroid Disease Sister      Obesity Sister      Hypertension Sister      Hyperlipidemia Sister      Thyroid Disease Sister      Obesity Sister      Unknown/Adopted Sister         Rheumatiod Arthritis/HypoThyroid     Obesity Paternal Grandmother      Unknown/Adopted Son         Sarcoidosis/Brain           Reviewed and updated as needed this visit by Provider  Meds         Review of Systems   ROS COMP: Constitutional, HEENT, cardiovascular, pulmonary, gi and gu systems are negative, except as otherwise noted.      Objective    /70 (BP Location: Right arm, Patient Position: Sitting, Cuff Size: Adult Large)   Pulse 85   Temp 98.3  F (36.8  C) (Oral)   Resp 16   Ht 1.651 m (5' 5\")   Wt 104.7 kg (230 lb 14.4 oz)   SpO2 100%   BMI 38.42 kg/m    Body mass index is 38.42 kg/m .  Physical Exam    GENERAL: healthy, alert and no distress. Pale compared to baseline per patient   EYES: Eyes grossly normal to inspection, PERRL and conjunctivae and sclerae normal  HENT: ear canals and TM's normal, nose and mouth without ulcers or lesions  NECK: no adenopathy, no asymmetry, masses, or scars and thyroid normal to palpation  RESP: lungs clear to auscultation - no rales, rhonchi or wheezes  CV: regular rate and rhythm, normal S1 S2, no S3 or S4, no murmur, click or rub, no peripheral edema and peripheral pulses strong  ABDOMEN: soft, nontender, no hepatosplenomegaly, no masses and bowel sounds normal  MS: no gross musculoskeletal defects noted, no edema  NEURO: Normal strength and tone, mentation intact and speech normal  PSYCH: mentation appears normal, affect normal/bright    Diagnostic Test Results:  Results for orders placed or performed in visit on 06/26/19 (from the past 24 hour(s))   Iron and iron binding capacity   Result Value Ref Range    Iron 23 (L) 35 - 180 ug/dL    Iron Binding Cap 431 " (H) 240 - 430 ug/dL    Iron Saturation Index 5 (L) 15 - 46 %   Ferritin   Result Value Ref Range    Ferritin 5 (L) 8 - 252 ng/mL   TSH with free T4 reflex   Result Value Ref Range    TSH 0.63 0.40 - 4.00 mU/L   CBC with platelets   Result Value Ref Range    WBC 10.9 4.0 - 11.0 10e9/L    RBC Count 4.35 3.8 - 5.2 10e12/L    Hemoglobin 9.8 (L) 11.7 - 15.7 g/dL    Hematocrit 33.5 (L) 35.0 - 47.0 %    MCV 77 (L) 78 - 100 fl    MCH 22.5 (L) 26.5 - 33.0 pg    MCHC 29.3 (L) 31.5 - 36.5 g/dL    RDW 16.7 (H) 10.0 - 15.0 %    Platelet Count 500 (H) 150 - 450 10e9/L   Erythrocyte sedimentation rate auto   Result Value Ref Range    Sed Rate 40 (H) 0 - 30 mm/h   Lipid panel reflex to direct LDL Fasting   Result Value Ref Range    Cholesterol 211 (H) <200 mg/dL    Triglycerides 182 (H) <150 mg/dL    HDL Cholesterol 74 >49 mg/dL    LDL Cholesterol Calculated 101 (H) <100 mg/dL    Non HDL Cholesterol 137 (H) <130 mg/dL           Assessment & Plan     1. Ureterolithiasis  Doing well, has urology f/up in place for stent removal. Will discuss flomax and oxybutinin with them.     2. Normocytic anemia  Actually now microcytic on labs from 6/26. Consistent with iron deficiency. Will recommend iron supplement as well as GI evaluation for occult bleed.   - Iron and iron binding capacity  - Ferritin  - CBC with platelets  - GASTROENTEROLOGY ADULT REF PROCEDURE ONLY Deb  [phone]; No Provider Preference  - Erythrocyte sedimentation rate auto    3. Family history of thyroid disorder  - TSH with free T4 reflex    4. Family history of diabetes mellitus  - Lipid panel reflex to direct LDL Fasting    5. Polyarthritis  ESR elevated, awaiting other labs to determine f/up plan.   - Erythrocyte sedimentation rate auto  - Rheumatoid factor  - Cyclic Citrullinated Peptide Antibody IgG    6. Family history of rheumatoid arthritis  - Erythrocyte sedimentation rate auto  - Rheumatoid factor  - Cyclic Citrullinated Peptide Antibody IgG    "  BMI:   Estimated body mass index is 38.42 kg/m  as calculated from the following:    Height as of this encounter: 1.651 m (5' 5\").    Weight as of this encounter: 104.7 kg (230 lb 14.4 oz).   Weight management plan: Patient was referred to their PCP to discuss a diet and exercise plan.        Patient Instructions   Thanks for coming in today! Here is what we discussed:       1) anemia - lets get some blood tests today looking at your iron levels. Also, we should have gastroenterology do an endoscopy/colonoscopy to look for GI bleeding (as well as eval the hiatal hernia). They will call you to schedule.  We will send you a message on [employer] with lab results.     2) Stent/stone - keep up the good work with recovery and see urology as planned    3) See Dr. Fung in 6 weeks.       No follow-ups on file.    Leandra Melvin MD  [facility]    I have seen the patient, discussed with the resident and agree with the history, physical and plan as documented above.    Bing Fung MD  Internal Medicine - Pediatrics        "

## 2019-06-27 DIAGNOSIS — D50.9 IRON DEFICIENCY ANEMIA, UNSPECIFIED IRON DEFICIENCY ANEMIA TYPE: Primary | ICD-10-CM

## 2019-06-27 LAB
CCP AB SER IA-ACNC: 1 U/ML
CHOLEST SERPL-MCNC: 211 MG/DL
FERRITIN SERPL-MCNC: 5 NG/ML (ref 8–252)
HDLC SERPL-MCNC: 74 MG/DL
IRON SATN MFR SERPL: 5 % (ref 15–46)
IRON SERPL-MCNC: 23 UG/DL (ref 35–180)
LDLC SERPL CALC-MCNC: 101 MG/DL
NONHDLC SERPL-MCNC: 137 MG/DL
TIBC SERPL-MCNC: 431 UG/DL (ref 240–430)
TRIGL SERPL-MCNC: 182 MG/DL
TSH SERPL DL<=0.005 MIU/L-ACNC: 0.63 MU/L (ref 0.4–4)

## 2019-06-28 LAB — RHEUMATOID FACT SER NEPH-ACNC: <20 IU/ML (ref 0–20)

## 2019-07-01 ENCOUNTER — OFFICE VISIT (OUTPATIENT)
Dept: UROLOGY | Facility: CLINIC | Age: 59
End: 2019-07-01
Payer: COMMERCIAL

## 2019-07-01 VITALS — BODY MASS INDEX: 38.32 KG/M2 | OXYGEN SATURATION: 97 % | HEIGHT: 65 IN | WEIGHT: 230 LBS | HEART RATE: 88 BPM

## 2019-07-01 DIAGNOSIS — N20.0 NEPHROLITHIASIS: Primary | ICD-10-CM

## 2019-07-01 LAB
ALBUMIN UR-MCNC: 30 MG/DL
APPEARANCE UR: CLEAR
BILIRUB UR QL STRIP: NEGATIVE
COLOR UR AUTO: YELLOW
GLUCOSE UR STRIP-MCNC: NEGATIVE MG/DL
HGB UR QL STRIP: ABNORMAL
KETONES UR STRIP-MCNC: NEGATIVE MG/DL
LEUKOCYTE ESTERASE UR QL STRIP: ABNORMAL
NITRATE UR QL: NEGATIVE
PH UR STRIP: 6 PH (ref 5–7)
SOURCE: ABNORMAL
SP GR UR STRIP: 1.02 (ref 1–1.03)
UROBILINOGEN UR STRIP-ACNC: 0.2 EU/DL (ref 0.2–1)

## 2019-07-01 PROCEDURE — 52310 CYSTOSCOPY AND TREATMENT: CPT | Mod: 58 | Performed by: UROLOGY

## 2019-07-01 PROCEDURE — 81003 URINALYSIS AUTO W/O SCOPE: CPT | Mod: QW | Performed by: UROLOGY

## 2019-07-01 RX ORDER — CIPROFLOXACIN 500 MG/1
500 TABLET, FILM COATED ORAL ONCE
Qty: 1 TABLET | Refills: 0 | Status: SHIPPED | OUTPATIENT
Start: 2019-07-01 | End: 2019-07-10

## 2019-07-01 RX ORDER — NITROFURANTOIN 25; 75 MG/1; MG/1
100 CAPSULE ORAL ONCE
Qty: 1 CAPSULE | Refills: 0 | Status: SHIPPED | OUTPATIENT
Start: 2019-07-01 | End: 2019-07-01

## 2019-07-01 ASSESSMENT — PAIN SCALES - GENERAL: PAINLEVEL: NO PAIN (1)

## 2019-07-01 ASSESSMENT — MIFFLIN-ST. JEOR: SCORE: 1614.15

## 2019-07-01 NOTE — NURSING NOTE
Prior to the start of the procedure and with procedural staff participation, I verbally confirmed the patient s identity using two indicators, relevant allergies, that the procedure was appropriate and matched the consent or emergent situation, and that the correct equipment/implants were available. Immediately prior to starting the procedure I conducted the Time Out with the procedural staff and re-confirmed the patient s name, procedure, and site/side. (The Joint Commission universal protocol was followed.)  Yes    Sedation (Moderate or Deep): None  Pt has signed the consent form stating that we will be doing a CYSTOSCOPY (with or without stent removal) today, and that it is the correct procedure. I verbally confirmed the patient s identity using two indicators, relevant allergies, and that the correct equipment was available. Post-op information given to the pt as needed at check-out. I have sent an appropriate antibiotic to the pharmacy in our building as recommended by the MD. MADHU Dallas CMA    No UA at this time

## 2019-07-01 NOTE — PATIENT INSTRUCTIONS
"AFTER YOUR CYSTOSCOPY  ?  ?  You have just completed a cystoscopy, or \"cysto\", which allowed your physician to learn more about your bladder (or to remove a stent placed after surgery). We suggest that you continue to avoid caffeine, fruit juice, and alcohol for the next 24 hours, however, you are encouraged to return to your normal activities.  ?  ?  A few things that are considered normal after your cystoscopy:  ?  * small amount of bleeding (or spotting) that clears within the next 24 hours  ?  * slight burning sensation with urination  ?  * sensation of needing to void (urinate) more frequently  ?  * the feeling of \"air\" in your urine  ?  * mild discomfort that is relieved with Tylenol    * bladder spasms  ?  ?  ?  Please contact our office promptly if you:  ?  * develop a fever above 101 degrees  ?  * are unable to urinate  ?  * develop bright red blood that does not stop  ?  * experience severe pain or swelling  ?  ?  ?  And of course, please contact our office with any concerns or questions [phone]  ?      AFTER YOUR CYSTOSCOPY        You have just completed a cystoscopy, or \"cysto\", which allowed your physician to learn more about your bladder (or to remove a stent placed after surgery). We suggest that you continue to avoid caffeine, fruit juice, and alcohol for the next 24 hours, however, you are encouraged to return to your normal activities.         A few things that are considered normal after your cystoscopy:     * Small amount of bleeding (or spotting) that clears within the next 24 hours     * Slight burning sensation with urination     * Sensation to of needing to avoid more frequently     * The feeling of \"air\" in your urine     * Mild discomfort that is relieved with Tylenol        Please contact our office promptly if you:     * Develop a fever above 101 degrees     * Are unable to urinate     * Develop bright red blood that does not stop     * Severe pain or swelling         Please contact " our office with any concerns or questions @[facility].

## 2019-07-01 NOTE — PROGRESS NOTES
Berna Lee is a 59 year old female with an indwelling ureteral stent in need of removal. Left ureteroscopy 6/20/2019. Here for stent removal. Stone free at end of case.    Calculi composed primarily of   calcium oxalate monohydrate.     CYSTOSCOPY PROCEDURE:  After sterile preparation and draping of the patient,  a 17-Sami flexible cystoscope was introduced via the urethra.  It was passed without difficulty into the bladder.  The urethra was open without evidence of stricture.  The ureteral orifices were orthotopic.  The double J stent was seen coming out the left side.  It was grasped with an alligator forceps and extracted intact without difficulty.  The patient tolerated the procedure well    A/P Successful stent removal  Prophylactic antibiotic ordered  Stone prevention counseling provided today    Watch for any new onset fevers, signs of UTI.  May expect some pain after removal.  If this is severe, or last many hours, you may need to return for replacement of stent.    Willis Reid MD  Urology  [facility]

## 2019-07-16 ENCOUNTER — HOSPITAL ENCOUNTER (OUTPATIENT)
Facility: CLINIC | Age: 59
Discharge: HOME | End: 2019-07-16
Attending: INTERNAL MEDICINE | Admitting: INTERNAL MEDICINE
Payer: COMMERCIAL

## 2019-07-16 VITALS
WEIGHT: 230 LBS | HEART RATE: 86 BPM | SYSTOLIC BLOOD PRESSURE: 127 MMHG | OXYGEN SATURATION: 93 % | BODY MASS INDEX: 38.32 KG/M2 | DIASTOLIC BLOOD PRESSURE: 83 MMHG | HEIGHT: 65 IN | RESPIRATION RATE: 16 BRPM

## 2019-07-16 LAB
COLONOSCOPY: NORMAL
UPPER GI ENDOSCOPY: NORMAL

## 2019-07-16 PROCEDURE — 40000104 ZZH STATISTIC MODERATE SEDATION < 10 MIN: Performed by: INTERNAL MEDICINE

## 2019-07-16 PROCEDURE — 88305 TISSUE EXAM BY PATHOLOGIST: CPT | Performed by: INTERNAL MEDICINE

## 2019-07-16 PROCEDURE — 25000128 H RX IP 250 OP 636: Performed by: INTERNAL MEDICINE

## 2019-07-16 PROCEDURE — 25000125 ZZHC RX 250: Performed by: INTERNAL MEDICINE

## 2019-07-16 PROCEDURE — 99153 MOD SED SAME PHYS/QHP EA: CPT

## 2019-07-16 PROCEDURE — G0500 MOD SEDAT ENDO SERVICE >5YRS: HCPCS | Performed by: INTERNAL MEDICINE

## 2019-07-16 PROCEDURE — 88305 TISSUE EXAM BY PATHOLOGIST: CPT | Mod: 26 | Performed by: INTERNAL MEDICINE

## 2019-07-16 PROCEDURE — 45378 DIAGNOSTIC COLONOSCOPY: CPT | Performed by: INTERNAL MEDICINE

## 2019-07-16 PROCEDURE — 43239 EGD BIOPSY SINGLE/MULTIPLE: CPT | Performed by: INTERNAL MEDICINE

## 2019-07-16 RX ORDER — ONDANSETRON 4 MG/1
4 TABLET, ORALLY DISINTEGRATING ORAL EVERY 6 HOURS PRN
Status: DISCONTINUED | OUTPATIENT
Start: 2019-07-16 | End: 2019-07-16 | Stop reason: HOSPADM

## 2019-07-16 RX ORDER — NALOXONE HYDROCHLORIDE 0.4 MG/ML
.1-.4 INJECTION, SOLUTION INTRAMUSCULAR; INTRAVENOUS; SUBCUTANEOUS
Status: DISCONTINUED | OUTPATIENT
Start: 2019-07-16 | End: 2019-07-16 | Stop reason: HOSPADM

## 2019-07-16 RX ORDER — ONDANSETRON 2 MG/ML
4 INJECTION INTRAMUSCULAR; INTRAVENOUS EVERY 6 HOURS PRN
Status: DISCONTINUED | OUTPATIENT
Start: 2019-07-16 | End: 2019-07-16 | Stop reason: HOSPADM

## 2019-07-16 RX ORDER — FLUMAZENIL 0.1 MG/ML
0.2 INJECTION, SOLUTION INTRAVENOUS
Status: DISCONTINUED | OUTPATIENT
Start: 2019-07-16 | End: 2019-07-16 | Stop reason: HOSPADM

## 2019-07-16 RX ORDER — FENTANYL CITRATE 50 UG/ML
INJECTION, SOLUTION INTRAMUSCULAR; INTRAVENOUS PRN
Status: DISCONTINUED | OUTPATIENT
Start: 2019-07-16 | End: 2019-07-16 | Stop reason: HOSPADM

## 2019-07-16 RX ORDER — LIDOCAINE 40 MG/G
CREAM TOPICAL
Status: DISCONTINUED | OUTPATIENT
Start: 2019-07-16 | End: 2019-07-16 | Stop reason: HOSPADM

## 2019-07-16 RX ORDER — ONDANSETRON 2 MG/ML
4 INJECTION INTRAMUSCULAR; INTRAVENOUS
Status: DISCONTINUED | OUTPATIENT
Start: 2019-07-16 | End: 2019-07-16 | Stop reason: HOSPADM

## 2019-07-16 ASSESSMENT — MIFFLIN-ST. JEOR: SCORE: 1614.15

## 2019-07-16 NOTE — DISCHARGE INSTRUCTIONS
Understanding H. pylori and Ulcers  Traditionally, ulcers, or sores in the lining of your digestive tract, were thought to be caused by too much spicy food, stress, or an anxious personality. We now know that most ulcers are probably due to infection with bacteria known as Helicobacter pylori (H. pylori).     H. pylori invade and disturb the lining of the digestive tract. Acid may weaken the area, causing an ulcer.      Common Ulcer Symptoms  Burning, cramping, or hunger-like pain in the stomach area, often one to three hours after a meal or in the middle of the night  Pain that gets better or worse with eating  Nausea or vomiting  Black, tarry, or bloody stools (which means the ulcer is bleeding)  Or you may have no symptoms.     An ulcer can form in two areas of the digestive tract; the stomach and the duodenum (where the stomach meets the small intestine).      Your Evaluation  An evaluation by your doctor can show if you have an ulcer and determine whether it was caused by H. pylori. Your doctor may ask you questions, examine you, and possibly do some tests. These may include:  A special X-ray called a barium upper gastrointestinal series, to help locate an ulcer. During the test, you drink a chalky liquid. This liquid helps the ulcer show up on the X-ray.  An endoscopic exam, done with a long tube passed through your mouth into your stomach, to give the doctor a closer look at your ulcer. You will be lightly sedated for this procedure. Your doctor can also take a tissue sample to test for H. pylori.  Blood, stool, and breath tests are also available to show whether you have H. pylori in your digestive tract.  Your Treatment  To kill H. pylori so your ulcer can heal, your doctor will probably prescribe antibiotics. Other ulcer medications that help reduce stomach acid may also be prescribed as well. Testing after treatment is recommended to be sure the H. pylori infection is gone. Usually, killing H. pylori  helps keep the ulcer from returning.    [id number] Landon Simmons, [address] [facility], [place] [address]. All rights reserved. This information is not intended as a substitute for professional medical care. Always follow your healthcare professional's instructions.    Understanding Colon and Rectal Polyps     The colon has a smooth lining composed of millions of cells.     The colon (also called the large intestine) is a muscular tube that forms the last part of the digestive tract. It absorbs water and stores food waste. The colon is about 4 to 6 feet long. The rectum is the last 6 inches of the colon. The colon and rectum have a smooth lining composed of millions of cells. Changes in these cells can lead to growths in the colon that can become cancerous and should be removed.     When the Colon Lining Changes  Changes that occur in the cells that line the colon or rectum can lead to growths called polyps. Over a period of years, polyps can turn cancerous. Removing polyps early may prevent cancer from ever forming.      Polyps  Polyps are fleshy clumps of tissue that form on the lining of the colon or rectum. Small polyps are usually benign (not cancerous). However, over time, cells in a polyp can change and become cancerous. The larger a polyp grows, the more likely this is to happen. Also, certain types of polyps known as adenomatous polyps are considered premalignant. This means that they will almost always become cancerous if they re not removed.          Cancer  Almost all colorectal cancers start when polyp cells begin growing abnormally. As a cancerous tumor grows, it may involve more and more of the colon or rectum. In time, cancer can also grow beyond the colon or rectum and spread to nearby organs or to glands called lymph nodes. The cells can also travel to other parts of the body. This is known as metastasis. The earlier a cancerous tumor is removed, the better the chance of preventing its  spread.        [id number] [facility], [address] [place] [address], [place] [address]. All rights reserved. This information is not intended as a substitute for professional medical care. Always follow your healthcare professional's instructions.

## 2019-07-16 NOTE — LETTER
June 28, 2019      Berna Lee  [address] [place] [address]        Dear Berna,         Thank you for choosing [facility]. You are scheduled for the following service(s).   Please be aware that coverage of these services is subject to the terms and limitations of your health insurance plan.  Call member services at your health plan with any benefit or coverage questions.    Date:  7-16-19             Procedure:  COLONOSCOPY  Doctor:        Juanjose   Arrival Time:  1230  *Check in at Emergency/Endoscopy desk*  Procedure Time:  100      Location:   [facility]        Endoscopy Department, First Floor (Enter through ER Doors) *        [address] [place], [place] [address] [phone] or [phone] () to reschedule      MIRALAX -GATORADE  PREP  Colonoscopy is the most accurate test to detect colon polyps and colon cancer; and the only test where polyps can be removed. During this procedure, a doctor examines the lining of your large intestine and rectum through a flexible tube.   Transportation  You must arrange for a ride for the day of your procedure with a responsible adult. A taxi , Uber, etc, is not an option unless you are accompanied by a responsible adult. If you fail to arrange transportation with a responsible adult, your procedure will be cancelled and rescheduled.    Purchase the  following supplies at your local pharmacy:  - 2 (two) bisacodyl tablets: each tablet contains 5 mg.  (Dulcolax  laxative NOT Dulcolax  stool softener)   - 1 (one) 8.3 oz bottle of Polyethylene Glycol (PEG) 3350 Powder   (MiraLAX , Smooth LAX , ClearLAX  or equivalent)  - 64 oz Gatorade    Regular Gatorade, Gatorade G2 , Powerade , Powerade Zero  or Pedialyte  is acceptable. Red colored flavors are not allowed; all other colors (yellow, green, orange, purple and blue) are okay. It is also okay to buy two 2.12 oz packets of powdered Gatorade  that can be mixed with water to a total volume of 64 oz of liquid.  - 1 (one) 10 oz bottle of Magnesium Citrate (Red colored flavors are not allowed)  It is also okay for you to use a 0.5 oz package of powdered magnesium citrate (17 g) mixed with 10 oz of water.      PREPARATION FOR COLONOSCOPY    7 days before:    Discontinue fiber supplements and medications containing iron. This includes Metamucil  and Fibercon ; and multivitamins with iron.    3 days before:    Begin a low-fiber diet. A low-fiber diet helps making the cleanout more effective.     Examples of a low-fiber diet include (but are not limited to): white bread, white rice, pasta, crackers, fish, chicken, eggs, ground beef, creamy peanut butter, cooked/steamed/boiled vegetables, canned fruit, bananas, melons, milk, plain yogurt cheese, salad dressing and other condiments.     The following are not allowed on a low-fiber diet: seeds, nuts, popcorn, bran, whole wheat, corn, quinoa, raw fruits and vegetables, berries and dried fruit, beans and lentils.    For additional details on low-fiber diet, please refer to the table on the last page.    2 days before:    Continue the low-fiber diet.     Drink at least 8 glasses of water throughout the day.     Stop eating solid foods at 11:45 pm.  1.   2. 1 day before:    In the morning: begin a clear liquid diet (liquids you can see through).     Examples of a clear liquid diet include: water, clear broth or bouillon, Gatorade, Pedialyte or Powerade, carbonated and non-carbonated soft drinks (Sprite , 7-Up , ginger ale), strained fruit juices without pulp (apple, white grape, white cranberry), Jell-O  and popsicles.     The following are not allowed on a clear liquid diet: red liquids, alcoholic beverages, dairy products (milk, creamer, and yogurt), protein shakes, creamy broths, juice with pulp and chewing tobacco.    At noon: take 2 (two) bisacodyl tablets     At 4 (and no later than 6pm): start drinking the  Miralax-Gatorade preparation (8.3 oz of Miralax mixed with 64 oz of Gatorade in a large pitcher). Drink 1(one) 8 oz glass every 15 minutes thereafter, until the mixture is gone.    COLON CLEANSING TIPS: drink adequate amounts of fluids before and after your colon cleansing to prevent dehydration. Stay near a toilet because you will have diarrhea. Even if you are sitting on the toilet, continue to drink the cleansing solution every 15 minutes. If you feel nauseous or vomit, rinse your mouth with water, take a 15 to 30-minute-break and then continue drinking the solution. You will be uncomfortable until the stool has flushed from your colon (in about 2 to 4 hours). You may feel chilled.    Day of your procedure  You may take all of your morning medications including blood pressure medications, blood thinners (if you have not been instructed to stop these by our office), methadone, anti-seizure medications with sips of water 3 hours prior to your procedure or earlier. Do not take insulin or vitamins prior to your procedure. Continue the clear liquid diet.       4 hours prior: drink 10 oz of magnesium citrate. It may be easier to drink it with a straw.    STOP consuming all liquids after that.     Do not take anything by mouth during this time.     Allow extra time to travel to your procedure as you may need to stop and use a restroom along the way.    You are ready for the procedure, if you followed all instructions and your stool is no longer formed, but clear or yellow liquid. If you are unsure whether your colon is clean, please call our office at [phone] before you leave for your appointment.    Bring the following to your procedure:  - Insurance Card/Photo ID.   - List of current medications including over-the-counter medications and supplements.   - Your rescue inhaler if you currently use one to control asthma.      Canceling or rescheduling your appointment:   If you must cancel or reschedule your  appointment, please call [phone] as soon as possible.      COLONOSCOPY PRE-PROCEDURE CHECKLIST    If you have diabetes, ask your regular doctor for diet and medication restrictions.  If you take an anticoagulant or anti-platelet medication (such as Coumadin , Lovenox , Pradaxa , Xarelto , Eliquis , etc.), please call your primary doctor for advice on holding this medication.  If you take aspirin you may continue to do so.  If you are or may be pregnant, please discuss the risks and benefits of this procedure with your doctor.        What happens during a colonoscopy?    Plan to spend up to two hours, starting at registration time, at the endoscopy center the day of your procedure. The colonoscopy takes an average of 15 to 30 minutes. Recovery time is about 30 minutes.      Before the exam:    You will change into a gown.    Your medical history and medication list will be reviewed with you, unless that has been done over the phone prior to the procedure.     A nurse will insert an intravenous (IV) line into your hand or arm.    The doctor will meet with you and will give you a consent form to sign.  1. During the exam:     Medicine will be given through the IV line to help you relax.     Your heart rate and oxygen levels will be monitored. If your blood pressure is low, you may be given fluids through the IV line.     The doctor will insert a flexible hollow tube, called a colonoscope, into your rectum. The scope will be advanced slowly through the large intestine (colon).    You may have a feeling of fullness or pressure.     If an abnormal tissue or a polyp is found, the doctor may remove it through the endoscope for closer examination, or biopsy. Tissue removal is painless    After the exam:           Any tissue samples removed during the exam will be sent to a lab for evaluation. It may take 5-7 working days for you to be notified of the results.     A nurse will provide you with complete discharge  instructions before you leave the endoscopy center. Be sure to ask the nurse for specific instructions if you take blood thinners such as Aspirin, Coumadin or Plavix.     The doctor will prepare a full report for you and for the physician who referred you for the procedure.     Your doctor will talk with you about the initial results of your exam.      Medication given during the exam will prohibit you from driving for the rest of the day.     Following the exam, you may resume your normal diet. Your first meal should be light, no greasy foods. Avoid alcohol until the next day.     You may resume your regular activities the day after the procedure.         LOW-FIBER DIET    Foods RECOMMENDED Foods to AVOID   Breads, Cereal, Rice and Pasta:   White bread, rolls, biscuits, croissant and gayle toast.   Waffles, Belarusian toast and pancakes.   White rice, noodles, pasta, macaroni and peeled cooked potatoes.   Plain crackers and saltines.   Cooked cereals: farina, cream of rice.   Cold cereals: Puffed Rice , Rice Krispies , Corn Flakes  and Special K    Breads, Cereal, Rice and Pasta:   Breads or rolls with nuts, seeds or fruit.   Whole wheat, pumpernickel, rye breads and cornbread.   Potatoes with skin, brown or wild rice, and kasha (buckwheat).     Vegetables:   Tender cooked and canned vegetables without seeds: carrots, asparagus tips, green or wax beans, pumpkin, spinach, lima beans. Vegetables:   Raw or steamed vegetables.   Vegetables with seeds.   Sauerkraut.   Winter squash, peas, broccoli, Brussel sprouts, cabbage, onions, cauliflower, baked beans, peas and corn.   Fruits:   Strained fruit juice.   Canned fruit, except pineapple.   Ripe bananas and melon. Fruits:   Prunes and prune juice.   Raw fruits.   Dried fruits: figs, dates and raisins.   Milk/Dairy:   Milk: plain or flavored.   Yogurt, custard and ice cream.   Cheese and cottage cheese Milk/Dairy:     Meat and other proteins:   ground, well-cooked tender  beef, lamb, ham, veal, pork, fish, poultry and organ meats.   Eggs.   Peanut butter without nuts. Meat and other proteins:   Tough, fibrous meats with gristle.   Dry beans, peas and lentils.   Peanut butter with nuts.   Tofu.   Fats, Snack, Sweets, Condiments and Beverages:   Margarine, butter, oils, mayonnaise, sour cream and salad dressing, plain gravy.   Sugar, hard candy, clear jelly, honey and syrup.   Spices, cooked herbs, bouillon, broth and soups made with allowed vegetable, ketchup and mustard.   Coffee, tea and carbonated drinks.   Plain cakes, cookies and pretzels.   Gelatin, plain puddings, custard, ice cream, sherbet and popsicles. Fats, Snack, Sweets, Condiments and Beverages:   Nuts, seeds and coconut.   Jam, marmalade and preserves.   Pickles, olives, relish and horseradish.   All desserts containing nuts, seeds, dried fruit and coconut; or made from whole grains or bran.   Candy made with nuts or seeds.   Popcorn.                     DIRECTIONS TO THE ENDOSCOPY DEPARTMENT     From the north ([facility])  Take [address] South, exit on [address] [address]. Get into the left hand shelly, turn left (east), go one-half mile to [address] and turn left. Go north to the first stoplight, take a right on [address] and follow it to the Emergency entrance.    From the south ([facility])  Take 35N to the [address]E split and exit on [address] [address]. On [address] [address], turn left (west) to [address]. Turn right (north) on [address]. Go north to the first stoplight, take a right on [address] and follow it to the Emergency entrance.    From the east via 35E ([facility])  Take [address] south to [address] [address] exit. Turn right on [address] [address]. Go west to [address]. Turn right (north) on [address]. Go to the first stoplight, take a right and follow on [address] to the Emergency entrance.    From the east via [address] ([facility])  [facility] [address]  to [address]. Turn left (south) on [address] to [address]. Turn left (east) on [address] and follow it to the Emergency entrance.    From the west via [address] ([place], [place])  Take [address] east to [address]. Turn right (south) on [address] to [address]. Turn left (east) on [address] and follow it to the Emergency entrance.

## 2019-07-16 NOTE — H&P
Pre-Endoscopy History and Physical     Berna Lee MRN# [id number]   YOB: 1960 Age: 59 year old     Date of Procedure: 7/16/2019  Primary care provider: Bing Fung  Type of Endoscopy: Colonoscopy with possible biopsy, possible polypectomy and Gastroscopy with possible biopsy, possible dilation  Reason for Procedure: anemia  Type of Anesthesia Anticipated: Conscious Sedation    HPI:    Berna is a 59 year old female who will be undergoing the above procedure.      A history and physical has been performed. The patient's medications and allergies have been reviewed. The risks and benefits of the procedure and the sedation options and risks were discussed with the patient.  All questions were answered and informed consent was obtained.      She denies a personal or family history of anesthesia complications or bleeding disorders.     Patient Active Problem List   Diagnosis     Family history of thyroid disorder     History of dysplastic nevus     Nephrolithiasis     Family history of diabetes mellitus     Obesity     Ureterolithiasis        Past Medical History:   Diagnosis Date     Allergic state      Depressive disorder 2003 - 2004    treated with Celexa     History of blood transfusion     prior to hyst     Kidney stones 1989, 2007,1998,2003,2007     Mumps      Nephrolithiasis 3/2/2015     PONV (postoperative nausea and vomiting)     n/v postop        Past Surgical History:   Procedure Laterality Date     ABDOMEN SURGERY  Feb 1996    Abdominal Hysterectomy     BIOPSY  July 2013    mole on back     COLONOSCOPY  8/2/2013 Dr Sow [facility]     COLONOSCOPY  8/2/2013    Procedure: COLONOSCOPY;  Colonoscopy;  Surgeon: Nikita Sow MD;  Location:  GI     COMBINED CYSTOSCOPY, RETROGRADES, URETEROSCOPY, LASER HOLMIUM LITHOTRIPSY URETER(S), INSERT STENT Right 3/3/2015    Procedure: COMBINED CYSTOSCOPY, RETROGRADES, URETEROSCOPY, LASER HOLMIUM LITHOTRIPSY URETER(S), INSERT STENT;  Surgeon:  Carlos Samson MD;  Location: [facility] OR     CYSTOSCOPY       ENT SURGERY  1964    T&A as a child     GENITOURINARY SURGERY  3/3/15 Dr Samson    Kidney Stone Removed - Cystoscopy  Right side     HEAD & NECK SURGERY  1975    wisdom teeth removed     HYSTERECTOMY, PAP NO LONGER INDICATED       LASER HOLMIUM LITHOTRIPSY URETER(S), INSERT STENT, COMBINED Left 6/20/2019    Procedure: 1. Cystourethroscopy 2. Left ureteroscopy with holmium laser lithotripsy and stone extraction 3. Left retrograde pyelography with interpretation of intraoperative fluoroscopic imaging 4. Left ureteral stent placement;  Surgeon: Willis Reid MD;  Location: [facility] OR     ORTHOPEDIC SURGERY  May 2015    Broken ankle - left     SOFT TISSUE SURGERY  1982    left wrist ganglion cyst     SOFT TISSUE SURGERY  1981    bilateral ingrown toenail removal surgery       Social History     Tobacco Use     Smoking status: Passive Smoke Exposure - Never Smoker     Smokeless tobacco: Never Used     Tobacco comment: Live with a smoker   Substance Use Topics     Alcohol use: Yes     Alcohol/week: 0.0 oz     Comment: occasional , social       Family History   Problem Relation Age of Onset     Hypertension Mother      Arthritis Mother         rheumatoid arthritis     Thyroid Disease Mother      Osteoporosis Mother      Neurologic Disorder Mother      Genitourinary Problems Mother      Cerebrovascular Disease Mother      Anxiety Disorder Mother      Obesity Mother      Unknown/Adopted Mother         Rheumatoid Arthritis/ HypoThyroid     Hypertension Father      Lipids Father      Coronary Artery Disease Father      Hyperlipidemia Father      Obesity Father      Depression Maternal Grandmother      Thyroid Disease Maternal Grandmother      Gastrointestinal Disease Maternal Grandmother      Osteoporosis Maternal Grandmother      Diabetes Maternal Grandfather      Genitourinary Problems Maternal Grandfather      Thyroid Disease Sister      Obesity Sister   "    Hypertension Sister      Hyperlipidemia Sister      Thyroid Disease Sister      Obesity Sister      Unknown/Adopted Sister         Rheumatiod Arthritis/HypoThyroid     Obesity Paternal Grandmother      Unknown/Adopted Son         Sarcoidosis/Brain       Prior to Admission medications    Medication Sig Start Date End Date Taking? Authorizing Provider   ibuprofen (ADVIL/MOTRIN) 200 MG tablet Take 200-400 mg by mouth nightly as needed for mild pain With food/snack   Yes Unknown, Entered By History   econazole nitrate 1 % cream Apply topically daily 6/26/18   Bing Fung MD   Fexofenadine HCl (ALLEGRA ALLERGY PO) Take 180 mg by mouth At Bedtime     Reported, Patient   Nasal Dilators (BREATHE RIGHT)     Reported, Patient   PROBIOTIC PRODUCT PO Take 1 capsule by mouth daily     Reported, Patient       Allergies   Allergen Reactions     Augmentin Rash     Codeine Sulfate Rash     Ivp Dye [Contrast Dye] Rash     Sulfa Drugs Rash     Tetracyclines Rash        REVIEW OF SYSTEMS:   5 point ROS negative except as noted above in HPI, including Gen., Resp., CV, GI &  system review.    PHYSICAL EXAM:   There were no vitals taken for this visit. Estimated body mass index is 38.27 kg/m  as calculated from the following:    Height as of 7/1/19: 1.651 m (5' 5\").    Weight as of 7/1/19: 104.3 kg (230 lb).   GENERAL APPEARANCE: alert, and oriented  MENTAL STATUS: alert  AIRWAY EXAM: Mallampatti Class I (visualization of the soft palate, fauces, uvula, anterior and posterior pillars)  RESP: lungs clear to auscultation - no rales, rhonchi or wheezes  CV: regular rates and rhythm  DIAGNOSTICS:    Not indicated    IMPRESSION   ASA Class 2 - Mild systemic disease    PLAN:   Plan for Colonoscopy with possible biopsy, possible polypectomy and Gastroscopy with possible biopsy, possible dilation. We discussed the risks, benefits and alternatives and the patient wished to proceed.    The above has been forwarded to the consulting " provider.      Signed Electronically by: Nikita Sow  July 16, 2019

## 2019-07-17 LAB — COPATH REPORT: NORMAL

## 2019-07-31 ENCOUNTER — MYC MEDICAL ADVICE (OUTPATIENT)
Dept: PEDIATRICS | Facility: CLINIC | Age: 59
End: 2019-07-31

## 2019-08-01 NOTE — TELEPHONE ENCOUNTER
Please advise on [employer] message:       When I saw you on 6/26/19, you said you wanted me to make a follow up appointment for 6 weeks out to review all the tests and check my hemoglobin.  I just made an appointment but you are booked out until October.  Do I need an office visit or can you order blood work and follow up with a phone consult?  or do you have time slots you can fit me in sooner then October 10th?    *there is a future lab for CBC in system. Would you like to see patient earlier than October?      Patient Instructions 6/26/19   Thanks for coming in today! Here is what we discussed:     1) anemia - lets get some blood tests today looking at your iron levels. Also, we should have gastroenterology do an endoscopy/colonoscopy to look for GI bleeding (as well as eval the hiatal hernia). They will call you to schedule.  We will send you a message on [employer] with lab results.      2) Stent/stone - keep up the good work with recovery and see urology as planned     3) See Dr. Fung in 6 weeks.     No follow-ups on file.     Leandra Melvin MD

## 2019-08-01 NOTE — TELEPHONE ENCOUNTER
OK for lab only visit for repeat blood counts and then to do a phone visit with me in late August when I am back in the office.      Bing Fung MD  Internal Medicine - Pediatrics

## 2019-10-01 ENCOUNTER — HEALTH MAINTENANCE LETTER (OUTPATIENT)
Age: 59
End: 2019-10-01

## 2019-10-08 DIAGNOSIS — D50.9 IRON DEFICIENCY ANEMIA, UNSPECIFIED IRON DEFICIENCY ANEMIA TYPE: ICD-10-CM

## 2019-10-08 LAB
ERYTHROCYTE [DISTWIDTH] IN BLOOD BY AUTOMATED COUNT: 16.5 % (ref 10–15)
HCT VFR BLD AUTO: 38 % (ref 35–47)
HGB BLD-MCNC: 11.2 G/DL (ref 11.7–15.7)
MCH RBC QN AUTO: 23.9 PG (ref 26.5–33)
MCHC RBC AUTO-ENTMCNC: 29.5 G/DL (ref 31.5–36.5)
MCV RBC AUTO: 81 FL (ref 78–100)
PLATELET # BLD AUTO: 409 10E9/L (ref 150–450)
RBC # BLD AUTO: 4.69 10E12/L (ref 3.8–5.2)
WBC # BLD AUTO: 10.2 10E9/L (ref 4–11)

## 2019-10-08 PROCEDURE — 85027 COMPLETE CBC AUTOMATED: CPT | Performed by: PEDIATRICS

## 2019-10-08 PROCEDURE — 36415 COLL VENOUS BLD VENIPUNCTURE: CPT | Performed by: PEDIATRICS

## 2019-10-10 ENCOUNTER — MYC MEDICAL ADVICE (OUTPATIENT)
Dept: PEDIATRICS | Facility: CLINIC | Age: 59
End: 2019-10-10

## 2019-10-10 ENCOUNTER — OFFICE VISIT (OUTPATIENT)
Dept: PEDIATRICS | Facility: CLINIC | Age: 59
End: 2019-10-10
Payer: COMMERCIAL

## 2019-10-10 VITALS
DIASTOLIC BLOOD PRESSURE: 76 MMHG | HEIGHT: 65 IN | HEART RATE: 64 BPM | TEMPERATURE: 98.5 F | RESPIRATION RATE: 16 BRPM | WEIGHT: 231.2 LBS | OXYGEN SATURATION: 98 % | BODY MASS INDEX: 38.52 KG/M2 | SYSTOLIC BLOOD PRESSURE: 130 MMHG

## 2019-10-10 DIAGNOSIS — N20.1 URETEROLITHIASIS: ICD-10-CM

## 2019-10-10 DIAGNOSIS — N90.7 VULVAR CYST: ICD-10-CM

## 2019-10-10 DIAGNOSIS — K31.89 EROSIVE GASTROPATHY: ICD-10-CM

## 2019-10-10 DIAGNOSIS — Z87.01 H/O RECURRENT PNEUMONIA: ICD-10-CM

## 2019-10-10 DIAGNOSIS — Z23 NEED FOR INFLUENZA VACCINATION: ICD-10-CM

## 2019-10-10 DIAGNOSIS — D64.9 NORMOCYTIC ANEMIA: Primary | ICD-10-CM

## 2019-10-10 PROCEDURE — 90472 IMMUNIZATION ADMIN EACH ADD: CPT | Performed by: PEDIATRICS

## 2019-10-10 PROCEDURE — 90670 PCV13 VACCINE IM: CPT | Performed by: PEDIATRICS

## 2019-10-10 PROCEDURE — 90471 IMMUNIZATION ADMIN: CPT | Performed by: PEDIATRICS

## 2019-10-10 PROCEDURE — 99214 OFFICE O/P EST MOD 30 MIN: CPT | Mod: 25 | Performed by: PEDIATRICS

## 2019-10-10 PROCEDURE — 90682 RIV4 VACC RECOMBINANT DNA IM: CPT | Performed by: PEDIATRICS

## 2019-10-10 RX ORDER — CEFIXIME 400 MG/1
400 CAPSULE ORAL DAILY
Qty: 7 CAPSULE | Refills: 0 | Status: CANCELLED | OUTPATIENT
Start: 2019-10-10

## 2019-10-10 ASSESSMENT — MIFFLIN-ST. JEOR: SCORE: 1619.6

## 2019-10-10 NOTE — PROGRESS NOTES
Subjective     Berna Lee is a 59 year old female who presents to clinic today for the following health issues:    HPI     Patient is here to follow up on colonoscopy, endoscopy and anemia.     She also has a concern regarding a swollen gland in her labia that has been infected and causing her discomfort.     HPI    Presenting today to follow-up anemia.  Patient has undergone EGD and colonoscopy since our last office visit.  Her colonoscopy was normal, but her EGD showed evidence of erosive gastropathy.  Patient reports that she has been taking NSAIDs daily prior to her endoscopy.  She has now stopped this, and has noticed an improvement in her stomach symptoms.  She is using Tylenol for her chronic knee pain with moderate improvement.  She is also started taking Marija juice which she feels helps her pain.  She had no evidence of blood in her stools or other bleeding processes.  She has not really been able to tolerate iron, but is trying to get iron sources in her diet.  We reviewed her recent labs which show steady improvement in her hemoglobin.    Patient is also been suffering from kidney stones recently.  She is working to modify her diet and maintain adequate hydration to help prevent recurrence.     Patient also reports a long-standing problem with a cystic structure inside of her labia majora on the right side.  This is been present and intermittently problematic for the last 10+ years.  It swells and drains on average once per year.  This recently occurred.  When the area swelled she used heat to make a drain.  It is currently improving, but has had a bit of a waxing and waning course.  She has not had pain elsewhere.  She has not had systemic symptoms associated with this.    Patient also notes a history of recurrent pneumonia.  She is not currently feeling ill, but is very interested in pneumonia vaccine today.  She has not historically gotten the flu shot, but she is willing to have this  "today.      Reviewed and updated as needed this visit by Provider         Review of Systems   ROS COMP: Constitutional, kidney, cardiovascular, pulmonary, gi and gu systems are negative, except as otherwise noted.      Objective    /76 (BP Location: Right arm, Patient Position: Sitting, Cuff Size: Adult Large)   Pulse 64   Temp 98.5  F (36.9  C) (Tympanic)   Resp 16   Ht 1.651 m (5' 5\")   Wt 104.9 kg (231 lb 3.2 oz)   SpO2 98%   BMI 38.47 kg/m    Body mass index is 38.47 kg/m .  Physical Exam   GENERAL: healthy, alert and no distress   (female): normal labia majora, right skene's gland cyst - bluish hue, not bleeding or draining, approximately 1cm diameter  PSYCH: mentation appears normal, affect normal/bright    Diagnostic Test Results:  Results for orders placed or performed in visit on 10/08/19   **CBC with platelets FUTURE anytime   Result Value Ref Range    WBC 10.2 4.0 - 11.0 10e9/L    RBC Count 4.69 3.8 - 5.2 10e12/L    Hemoglobin 11.2 (L) 11.7 - 15.7 g/dL    Hematocrit 38.0 35.0 - 47.0 %    MCV 81 78 - 100 fl    MCH 23.9 (L) 26.5 - 33.0 pg    MCHC 29.5 (L) 31.5 - 36.5 g/dL    RDW 16.5 (H) 10.0 - 15.0 %    Platelet Count 409 150 - 450 10e9/L           Assessment & Plan       ICD-10-CM    1. Normocytic anemia D64.9 CBC with platelets     Iron and iron binding capacity    Presumed related to NSAID inducted erosive gastropathy - now off NSAIDS and seeing improvement in blood counts, even off of iron supplementation.  Encouraged to try iron supplements 2-3 times per week if tolerated.  Repeat labs again in 2 months   2. Erosive gastropathy K31.89 See above, no NSAIDS   3. Vulvar cyst N90.7 OB/GYN REFERRAL  Presumed [employer]'s gland cyst with recurrent abscess issues over the last decade.  No current need for antibiotics, but patient to contact me with concerns about worsening or abscess formation if this occurs prior to her being able to get in to see Ob/Gyn for definitive treatment.  Numerous " "antibiotic allergies would make antibiotic choice challenging   4. Ureterolithiasis N20.1 Patient modifying diet and staying well hydrated   5. H/O recurrent pneumonia Z87.01 PNEUMOCOCCAL CONJ VACCINE 13 VALENT IM   6. Need for influenza vaccination Z23      ADMIN VACCINE, FIRST [[id number]]     FLU VAC, QUADRIVALENT (RIV4) RECOMBINANT DNA, IM        BMI:   Estimated body mass index is 38.47 kg/m  as calculated from the following:    Height as of this encounter: 1.651 m (5' 5\").    Weight as of this encounter: 104.9 kg (231 lb 3.2 oz).   Weight management plan: Discussed healthy diet and exercise guidelines        See Patient Instructions    No follow-ups on file.    Bing Fung MD  [facility]    "

## 2019-10-10 NOTE — PATIENT INSTRUCTIONS
Continue avoiding ibuprofen and aleve    Come back for repeat labs to recheck your blood counts in 2 months    Call for visit with Ob/Gyn    Continue warm compresses if fluid builds up again

## 2020-06-10 ENCOUNTER — TRANSFERRED RECORDS (OUTPATIENT)
Dept: HEALTH INFORMATION MANAGEMENT | Facility: CLINIC | Age: 60
End: 2020-06-10

## 2021-01-15 ENCOUNTER — HEALTH MAINTENANCE LETTER (OUTPATIENT)
Age: 61
End: 2021-01-15

## 2021-04-15 ENCOUNTER — IMMUNIZATION (OUTPATIENT)
Dept: NURSING | Facility: CLINIC | Age: 61
End: 2021-04-15
Payer: COMMERCIAL

## 2021-04-15 PROCEDURE — 0001A PR COVID VAC PFIZER DIL RECON 30 MCG/0.3 ML IM: CPT

## 2021-04-15 PROCEDURE — 91300 PR COVID VAC PFIZER DIL RECON 30 MCG/0.3 ML IM: CPT

## 2021-05-06 ENCOUNTER — IMMUNIZATION (OUTPATIENT)
Dept: NURSING | Facility: CLINIC | Age: 61
End: 2021-05-06
Attending: INTERNAL MEDICINE
Payer: COMMERCIAL

## 2021-05-06 PROCEDURE — 91300 PR COVID VAC PFIZER DIL RECON 30 MCG/0.3 ML IM: CPT

## 2021-05-06 PROCEDURE — 0002A PR COVID VAC PFIZER DIL RECON 30 MCG/0.3 ML IM: CPT

## 2021-08-30 ENCOUNTER — TELEPHONE (OUTPATIENT)
Dept: PEDIATRICS | Facility: CLINIC | Age: 61
End: 2021-08-30

## 2021-08-30 NOTE — TELEPHONE ENCOUNTER
Reason for call:  Patient reporting a symptom    Symptom or request: anemia, depression and anxiety, increased heart rate    Duration (how long have symptoms been present): several weeks    Have you been treated for this before? Over a year ago    Additional comments: Patient would like to let her PCP know that she has been having these symptoms. She is scheduled for an office visit with another provider on 9/2    Phone Number patient can be reached at:  Home number on file [phone] (home)    Best Time:  any    Can we leave a detailed message on this number:  YES    Call taken on 8/30/2021 at 4:51 PM by Lakshmi Egan

## 2021-09-02 ENCOUNTER — HOSPITAL ENCOUNTER (INPATIENT)
Facility: CLINIC | Age: 61
LOS: 2 days | Discharge: HOME | DRG: 378 | End: 2021-09-04
Attending: EMERGENCY MEDICINE | Admitting: HOSPITALIST
Payer: COMMERCIAL

## 2021-09-02 ENCOUNTER — OFFICE VISIT (OUTPATIENT)
Dept: PEDIATRICS | Facility: CLINIC | Age: 61
End: 2021-09-02
Payer: COMMERCIAL

## 2021-09-02 VITALS
SYSTOLIC BLOOD PRESSURE: 150 MMHG | OXYGEN SATURATION: 98 % | BODY MASS INDEX: 39.94 KG/M2 | DIASTOLIC BLOOD PRESSURE: 84 MMHG | HEART RATE: 113 BPM | TEMPERATURE: 98.5 F | WEIGHT: 240 LBS

## 2021-09-02 DIAGNOSIS — R00.0 RAPID HEART BEAT: Primary | ICD-10-CM

## 2021-09-02 DIAGNOSIS — Z00.00 HEALTHCARE MAINTENANCE: ICD-10-CM

## 2021-09-02 DIAGNOSIS — K92.2 UGIB (UPPER GASTROINTESTINAL BLEED): ICD-10-CM

## 2021-09-02 DIAGNOSIS — D50.0 IRON DEFICIENCY ANEMIA DUE TO CHRONIC BLOOD LOSS: ICD-10-CM

## 2021-09-02 DIAGNOSIS — F41.1 GAD (GENERALIZED ANXIETY DISORDER): ICD-10-CM

## 2021-09-02 DIAGNOSIS — D62 ANEMIA DUE TO BLOOD LOSS, ACUTE: ICD-10-CM

## 2021-09-02 DIAGNOSIS — F33.1 MAJOR DEPRESSIVE DISORDER, RECURRENT EPISODE, MODERATE (H): ICD-10-CM

## 2021-09-02 DIAGNOSIS — I10 BENIGN ESSENTIAL HYPERTENSION: ICD-10-CM

## 2021-09-02 LAB
ABO/RH(D): NORMAL
ALBUMIN SERPL-MCNC: 3.3 G/DL (ref 3.4–5)
ALP SERPL-CCNC: 112 U/L (ref 40–150)
ALT SERPL W P-5'-P-CCNC: 19 U/L (ref 0–50)
ANION GAP SERPL CALCULATED.3IONS-SCNC: 7 MMOL/L (ref 3–14)
ANTIBODY SCREEN: NEGATIVE
AST SERPL W P-5'-P-CCNC: 32 U/L (ref 0–45)
BASOPHILS # BLD AUTO: 0.1 10E3/UL (ref 0–0.2)
BASOPHILS NFR BLD AUTO: 1 %
BILIRUB SERPL-MCNC: 0.6 MG/DL (ref 0.2–1.3)
BLD PROD TYP BPU: NORMAL
BLD PROD TYP BPU: NORMAL
BLOOD COMPONENT TYPE: NORMAL
BLOOD COMPONENT TYPE: NORMAL
BUN SERPL-MCNC: 14 MG/DL (ref 7–30)
CALCIUM SERPL-MCNC: 8.8 MG/DL (ref 8.5–10.1)
CHLORIDE BLD-SCNC: 105 MMOL/L (ref 94–109)
CO2 SERPL-SCNC: 24 MMOL/L (ref 20–32)
CODING SYSTEM: NORMAL
CODING SYSTEM: NORMAL
CREAT SERPL-MCNC: 0.73 MG/DL (ref 0.52–1.04)
CROSSMATCH: NORMAL
CROSSMATCH: NORMAL
EOSINOPHIL # BLD AUTO: 0.3 10E3/UL (ref 0–0.7)
EOSINOPHIL NFR BLD AUTO: 2 %
ERYTHROCYTE [DISTWIDTH] IN BLOOD BY AUTOMATED COUNT: 20.4 % (ref 10–15)
ERYTHROCYTE [DISTWIDTH] IN BLOOD BY AUTOMATED COUNT: 20.5 % (ref 10–15)
GFR SERPL CREATININE-BSD FRML MDRD: 89 ML/MIN/1.73M2
GLUCOSE BLD-MCNC: 109 MG/DL (ref 70–99)
HCT VFR BLD AUTO: 21.1 % (ref 35–47)
HCT VFR BLD AUTO: 23.1 % (ref 35–47)
HEMOCCULT STL QL: POSITIVE
HGB BLD-MCNC: 5.4 G/DL (ref 11.7–15.7)
HGB BLD-MCNC: 5.8 G/DL (ref 11.7–15.7)
HOLD SPECIMEN: NORMAL
IMM GRANULOCYTES # BLD: 0.1 10E3/UL
IMM GRANULOCYTES NFR BLD: 1 %
ISSUE DATE AND TIME: NORMAL
ISSUE DATE AND TIME: NORMAL
LYMPHOCYTES # BLD AUTO: 2.3 10E3/UL (ref 0.8–5.3)
LYMPHOCYTES NFR BLD AUTO: 18 %
MCH RBC QN AUTO: 18 PG (ref 26.5–33)
MCH RBC QN AUTO: 18 PG (ref 26.5–33)
MCHC RBC AUTO-ENTMCNC: 25.1 G/DL (ref 31.5–36.5)
MCHC RBC AUTO-ENTMCNC: 25.6 G/DL (ref 31.5–36.5)
MCV RBC AUTO: 70 FL (ref 78–100)
MCV RBC AUTO: 72 FL (ref 78–100)
MONOCYTES # BLD AUTO: 1.1 10E3/UL (ref 0–1.3)
MONOCYTES NFR BLD AUTO: 9 %
NEUTROPHILS # BLD AUTO: 8.8 10E3/UL (ref 1.6–8.3)
NEUTROPHILS NFR BLD AUTO: 69 %
NRBC # BLD AUTO: 0.1 10E3/UL
NRBC BLD AUTO-RTO: 1 /100
PLATELET # BLD AUTO: 467 10E3/UL (ref 150–450)
PLATELET # BLD AUTO: 520 10E3/UL (ref 150–450)
POTASSIUM BLD-SCNC: 4.2 MMOL/L (ref 3.4–5.3)
PROT SERPL-MCNC: 6.9 G/DL (ref 6.8–8.8)
RBC # BLD AUTO: 3 10E6/UL (ref 3.8–5.2)
RBC # BLD AUTO: 3.22 10E6/UL (ref 3.8–5.2)
SARS-COV-2 RNA RESP QL NAA+PROBE: NEGATIVE
SODIUM SERPL-SCNC: 136 MMOL/L (ref 133–144)
SPECIMEN EXPIRATION DATE: NORMAL
UNIT ABO/RH: NORMAL
UNIT ABO/RH: NORMAL
UNIT NUMBER: NORMAL
UNIT NUMBER: NORMAL
UNIT STATUS: NORMAL
UNIT STATUS: NORMAL
UNIT TYPE ISBT: 9500
UNIT TYPE ISBT: 9500
WBC # BLD AUTO: 12.6 10E3/UL (ref 4–11)
WBC # BLD AUTO: 13.1 10E3/UL (ref 4–11)

## 2021-09-02 PROCEDURE — 87635 SARS-COV-2 COVID-19 AMP PRB: CPT | Performed by: EMERGENCY MEDICINE

## 2021-09-02 PROCEDURE — P9016 RBC LEUKOCYTES REDUCED: HCPCS | Performed by: EMERGENCY MEDICINE

## 2021-09-02 PROCEDURE — 86923 COMPATIBILITY TEST ELECTRIC: CPT | Performed by: EMERGENCY MEDICINE

## 2021-09-02 PROCEDURE — C9113 INJ PANTOPRAZOLE SODIUM, VIA: HCPCS | Performed by: EMERGENCY MEDICINE

## 2021-09-02 PROCEDURE — 120N000001 HC R&B MED SURG/OB

## 2021-09-02 PROCEDURE — 96361 HYDRATE IV INFUSION ADD-ON: CPT

## 2021-09-02 PROCEDURE — 36415 COLL VENOUS BLD VENIPUNCTURE: CPT | Performed by: STUDENT IN AN ORGANIZED HEALTH CARE EDUCATION/TRAINING PROGRAM

## 2021-09-02 PROCEDURE — 99214 OFFICE O/P EST MOD 30 MIN: CPT | Mod: GC | Performed by: STUDENT IN AN ORGANIZED HEALTH CARE EDUCATION/TRAINING PROGRAM

## 2021-09-02 PROCEDURE — 99223 1ST HOSP IP/OBS HIGH 75: CPT | Mod: AI | Performed by: HOSPITALIST

## 2021-09-02 PROCEDURE — 86900 BLOOD TYPING SEROLOGIC ABO: CPT | Performed by: EMERGENCY MEDICINE

## 2021-09-02 PROCEDURE — 93000 ELECTROCARDIOGRAM COMPLETE: CPT | Performed by: PEDIATRICS

## 2021-09-02 PROCEDURE — 84155 ASSAY OF PROTEIN SERUM: CPT | Mod: 59 | Performed by: STUDENT IN AN ORGANIZED HEALTH CARE EDUCATION/TRAINING PROGRAM

## 2021-09-02 PROCEDURE — 258N000003 HC RX IP 258 OP 636: Performed by: EMERGENCY MEDICINE

## 2021-09-02 PROCEDURE — 258N000003 HC RX IP 258 OP 636: Performed by: HOSPITALIST

## 2021-09-02 PROCEDURE — 82247 BILIRUBIN TOTAL: CPT | Mod: 59 | Performed by: STUDENT IN AN ORGANIZED HEALTH CARE EDUCATION/TRAINING PROGRAM

## 2021-09-02 PROCEDURE — 96127 BRIEF EMOTIONAL/BEHAV ASSMT: CPT | Performed by: STUDENT IN AN ORGANIZED HEALTH CARE EDUCATION/TRAINING PROGRAM

## 2021-09-02 PROCEDURE — 99285 EMERGENCY DEPT VISIT HI MDM: CPT | Mod: 25

## 2021-09-02 PROCEDURE — 84450 TRANSFERASE (AST) (SGOT): CPT | Mod: 59 | Performed by: STUDENT IN AN ORGANIZED HEALTH CARE EDUCATION/TRAINING PROGRAM

## 2021-09-02 PROCEDURE — 82306 VITAMIN D 25 HYDROXY: CPT | Performed by: STUDENT IN AN ORGANIZED HEALTH CARE EDUCATION/TRAINING PROGRAM

## 2021-09-02 PROCEDURE — 36430 TRANSFUSION BLD/BLD COMPNT: CPT

## 2021-09-02 PROCEDURE — 82040 ASSAY OF SERUM ALBUMIN: CPT | Performed by: EMERGENCY MEDICINE

## 2021-09-02 PROCEDURE — 250N000011 HC RX IP 250 OP 636: Performed by: EMERGENCY MEDICINE

## 2021-09-02 PROCEDURE — 85027 COMPLETE CBC AUTOMATED: CPT | Performed by: EMERGENCY MEDICINE

## 2021-09-02 PROCEDURE — 84460 ALANINE AMINO (ALT) (SGPT): CPT | Mod: 59 | Performed by: STUDENT IN AN ORGANIZED HEALTH CARE EDUCATION/TRAINING PROGRAM

## 2021-09-02 PROCEDURE — 36415 COLL VENOUS BLD VENIPUNCTURE: CPT | Performed by: EMERGENCY MEDICINE

## 2021-09-02 PROCEDURE — 82272 OCCULT BLD FECES 1-3 TESTS: CPT | Performed by: EMERGENCY MEDICINE

## 2021-09-02 PROCEDURE — 93005 ELECTROCARDIOGRAM TRACING: CPT

## 2021-09-02 PROCEDURE — C9803 HOPD COVID-19 SPEC COLLECT: HCPCS

## 2021-09-02 PROCEDURE — 84075 ASSAY ALKALINE PHOSPHATASE: CPT | Mod: 59 | Performed by: STUDENT IN AN ORGANIZED HEALTH CARE EDUCATION/TRAINING PROGRAM

## 2021-09-02 PROCEDURE — 96374 THER/PROPH/DIAG INJ IV PUSH: CPT

## 2021-09-02 PROCEDURE — 80050 GENERAL HEALTH PANEL: CPT | Performed by: STUDENT IN AN ORGANIZED HEALTH CARE EDUCATION/TRAINING PROGRAM

## 2021-09-02 RX ORDER — SODIUM CHLORIDE 9 MG/ML
INJECTION, SOLUTION INTRAVENOUS CONTINUOUS
Status: DISCONTINUED | OUTPATIENT
Start: 2021-09-02 | End: 2021-09-04 | Stop reason: HOSPADM

## 2021-09-02 RX ORDER — ACETAMINOPHEN 650 MG/1
650 SUPPOSITORY RECTAL EVERY 6 HOURS PRN
Status: DISCONTINUED | OUTPATIENT
Start: 2021-09-02 | End: 2021-09-04 | Stop reason: HOSPADM

## 2021-09-02 RX ORDER — AMLODIPINE BESYLATE 5 MG/1
5 TABLET ORAL DAILY
Qty: 90 TABLET | Refills: 0 | Status: SHIPPED | OUTPATIENT
Start: 2021-09-02 | End: 2021-09-16

## 2021-09-02 RX ORDER — LIDOCAINE 40 MG/G
CREAM TOPICAL
Status: DISCONTINUED | OUTPATIENT
Start: 2021-09-02 | End: 2021-09-04 | Stop reason: HOSPADM

## 2021-09-02 RX ORDER — ONDANSETRON 4 MG/1
4 TABLET, ORALLY DISINTEGRATING ORAL EVERY 6 HOURS PRN
Status: DISCONTINUED | OUTPATIENT
Start: 2021-09-02 | End: 2021-09-04 | Stop reason: HOSPADM

## 2021-09-02 RX ORDER — ACETAMINOPHEN 325 MG/1
650 TABLET ORAL EVERY 6 HOURS PRN
Status: DISCONTINUED | OUTPATIENT
Start: 2021-09-02 | End: 2021-09-04 | Stop reason: HOSPADM

## 2021-09-02 RX ORDER — CITALOPRAM HYDROBROMIDE 10 MG/1
10 TABLET ORAL DAILY
Qty: 90 TABLET | Refills: 1 | Status: SHIPPED | OUTPATIENT
Start: 2021-09-02 | End: 2021-09-16

## 2021-09-02 RX ORDER — ONDANSETRON 2 MG/ML
4 INJECTION INTRAMUSCULAR; INTRAVENOUS EVERY 6 HOURS PRN
Status: DISCONTINUED | OUTPATIENT
Start: 2021-09-02 | End: 2021-09-04 | Stop reason: HOSPADM

## 2021-09-02 RX ORDER — CITALOPRAM HYDROBROMIDE 10 MG/1
10 TABLET ORAL DAILY
Status: CANCELLED | OUTPATIENT
Start: 2021-09-03

## 2021-09-02 RX ADMIN — SODIUM CHLORIDE 1000 ML: 9 INJECTION, SOLUTION INTRAVENOUS at 18:21

## 2021-09-02 RX ADMIN — SODIUM CHLORIDE: 9 INJECTION, SOLUTION INTRAVENOUS at 22:59

## 2021-09-02 RX ADMIN — PANTOPRAZOLE SODIUM 40 MG: 40 INJECTION, POWDER, FOR SOLUTION INTRAVENOUS at 18:50

## 2021-09-02 ASSESSMENT — ANXIETY QUESTIONNAIRES
3. WORRYING TOO MUCH ABOUT DIFFERENT THINGS: NEARLY EVERY DAY
IF YOU CHECKED OFF ANY PROBLEMS ON THIS QUESTIONNAIRE, HOW DIFFICULT HAVE THESE PROBLEMS MADE IT FOR YOU TO DO YOUR WORK, TAKE CARE OF THINGS AT HOME, OR GET ALONG WITH OTHER PEOPLE: SOMEWHAT DIFFICULT
1. FEELING NERVOUS, ANXIOUS, OR ON EDGE: SEVERAL DAYS
6. BECOMING EASILY ANNOYED OR IRRITABLE: MORE THAN HALF THE DAYS
5. BEING SO RESTLESS THAT IT IS HARD TO SIT STILL: NOT AT ALL
GAD7 TOTAL SCORE: 11
2. NOT BEING ABLE TO STOP OR CONTROL WORRYING: MORE THAN HALF THE DAYS
7. FEELING AFRAID AS IF SOMETHING AWFUL MIGHT HAPPEN: SEVERAL DAYS

## 2021-09-02 ASSESSMENT — ENCOUNTER SYMPTOMS
BRUISES/BLEEDS EASILY: 0
LIGHT-HEADEDNESS: 0
BLOOD IN STOOL: 0
HEADACHES: 0
FATIGUE: 1
PALPITATIONS: 1
WEAKNESS: 1
HEMATURIA: 0

## 2021-09-02 ASSESSMENT — PATIENT HEALTH QUESTIONNAIRE - PHQ9
SUM OF ALL RESPONSES TO PHQ QUESTIONS 1-9: 14
5. POOR APPETITE OR OVEREATING: MORE THAN HALF THE DAYS

## 2021-09-02 NOTE — PROGRESS NOTES
Assessment & Plan     Rapid heart beat  Pt presents with tachycardia. She is concerned about post vaccination myocarditis. No CP or dyspnea. ECG wnl. Possibly 2/2 hyperthyroid or anemia. Will evaluate and follow up.  - EKG 12-lead complete w/read - Clinics  - CBC with platelets; Future  - TSH with free T4 reflex; Future  - Comprehensive metabolic panel (BMP + Alb, Alk Phos, ALT, AST, Total. Bili, TP); Future  - CBC with platelets  - TSH with free T4 reflex  - Comprehensive metabolic panel (BMP + Alb, Alk Phos, ALT, AST, Total. Bili, TP)    Major depressive disorder, recurrent episode, moderate (H)  Previously on celexa. Will re-prescribe. Pt amenable to therapy.  - citalopram (CELEXA) 10 MG tablet; Take 1 tablet (10 mg) by mouth daily  - MENTAL HEALTH REFERRAL  - Adult; Outpatient Treatment; Individual/Couples/Family/Group Therapy/Health Psychology; [facility] [phone]; We will contact you to schedule the appointment or please call with any questions; Future  - Vitamin D Deficiency; Future  - Vitamin D Deficiency    JAROD (generalized anxiety disorder)  As MDD above  - citalopram (CELEXA) 10 MG tablet; Take 1 tablet (10 mg) by mouth daily  - MENTAL HEALTH REFERRAL  - Adult; Outpatient Treatment; Individual/Couples/Family/Group Therapy/Health Psychology; [facility] [phone]; We will contact you to schedule the appointment or please call with any questions; Future    Iron deficiency anemia due to chronic blood loss  Hx PUD, h pylori neg. Still with intermittent dark stools. Needs PPI and evaluation for anemia. **Update- Hgb critically low at 5.8. Discussed w/ pt who will present for evaluation at [facility]. Updated [place] and family with information. They agree to present promptly and NPO.  - Extra Tube; Future; can add on iron studies if anemic.  - Comprehensive metabolic panel (BMP + Alb, Alk Phos, ALT, AST, Total. Bili, TP); Future  - omeprazole (PRILOSEC) 20 MG DR capsule;  Take 1 capsule (20 mg) by mouth daily  - Extra Tube  - Comprehensive metabolic panel (BMP + Alb, Alk Phos, ALT, AST, Total. Bili, TP)    Healthcare maintenance  Amenable to screen.   - *MA Screening Digital Bilateral; Future    Benign essential hypertension  Possibly from anxiety but does have several presentations at which he was found to have htn.   - amLODIPine (NORVASC) 5 MG tablet; Take 1 tablet (5 mg) by mouth daily         Depression Screening Follow Up    PHQ 9/2/2021   PHQ-9 Total Score 14   Q9: Thoughts of better off dead/self-harm past 2 weeks Not at all         Follow Up Actions Taken  Crisis resource information provided in After Visit Summary  Mental Health Referral placed       Return in about 2 weeks (around 9/16/2021) for BP.    Herminio Ghotra MD  [facility]    I have seen the patient, discussed with the resident, was present during critical portion of visit, and was available to furnish services throughout the visit.  I agree with the history, physical and plan as documented above.    Bing Fung MD  Internal Medicine - Pediatrics      Subjective   Berna is a 61 year old who presents for the following health issues     HPI     Depression and Anxiety Follow-Up    How are you doing with your depression since your last visit? Worsened     How are you doing with your anxiety since your last visit?  Worsened     Are you having other symptoms that might be associated with depression or anxiety? Yes:  panic attacks   Due to covid lock down     Have you had a significant life event? OTHER: taking care of elderly parents and covid 19 lock down     Do you have any concerns with your use of alcohol or other drugs? No    Social History     Tobacco Use     Smoking status: Passive Smoke Exposure - Never Smoker     Smokeless tobacco: Never Used     Tobacco comment: Live with a smoker   Substance Use Topics     Alcohol use: Yes     Alcohol/week: 0.0 standard drinks     Comment: occasional  , social     Drug use: No     PHQ 8/24/2015 6/19/2019 9/2/2021   PHQ-9 Total Score 7 12 14   Q9: Thoughts of better off dead/self-harm past 2 weeks Not at all Not at all Not at all     JAROD-7 SCORE 9/2/2021   Total Score 11     JAROD-7  9/2/2021   1. Feeling nervous, anxious, or on edge 1   2. Not being able to stop or control worrying 2   3. Worrying too much about different things 3   4. Trouble relaxing 2   5. Being so restless that it is hard to sit still 0   6. Becoming easily annoyed or irritable 2   7. Feeling afraid, as if something awful might happen 1   JAROD-7 Total Score 11   If you checked any problems, how difficult have they made it for you to do your work, take care of things at home, or get along with other people? Somewhat difficult       Suicide Assessment Five-step Evaluation and Treatment (SAFE-T)  No SI.    Pt w/ PUD and intermittent dark stools. No dizziness.       Review of Systems   Constitutional, HEENT, cardiovascular, pulmonary, GI, , musculoskeletal, neuro, skin, endocrine and psych systems are negative, except as otherwise noted.      Objective    BP (!) 150/84 (BP Location: Right arm, Cuff Size: Adult Large)   Pulse 113   Temp 98.5  F (36.9  C) (Tympanic)   Wt 108.9 kg (240 lb)   SpO2 98%   BMI 39.94 kg/m    Body mass index is 39.94 kg/m .  Physical Exam   GENERAL: healthy, alert and no distress  EYES: Eyes grossly normal to inspection, PERRL and conjunctivae and sclerae normal  HENT: ear canals and TM's normal, nose and mouth without ulcers or lesions  NECK: no adenopathy, no asymmetry, masses, or scars and thyroid normal to palpation  RESP: lungs clear to auscultation - no rales, rhonchi or wheezes  CV: tachycardic rate and rhythm, normal S1 S2, no S3 or S4, no murmur, click or rub, no peripheral edema and peripheral pulses strong  ABDOMEN: soft, nontender, no hepatosplenomegaly, no masses and bowel sounds normal  MS: no gross musculoskeletal defects noted, no edema  SKIN: no  suspicious lesions or rashes  NEURO: Normal strength and tone, mentation intact and speech normal  PSYCH: mentation appears normal, affect normal/bright    Results for orders placed or performed in visit on 09/02/21 (from the past 24 hour(s))   Extra Tube    Narrative    The following orders were created for panel order Extra Tube.  Procedure                               Abnormality         Status                     ---------                               -----------         ------                     Extra Red Top Tube[[id number]]                               In process                   Please view results for these tests on the individual orders.

## 2021-09-02 NOTE — ED PROVIDER NOTES
History   Chief Complaint:  Generalized Weakness, Fatigue, and Abnormal Labs     The history is provided by the patient.      Berna Lee is a 61 year old female who presents with generalized weakness, fatigue, and abnormal labs. Berna reports of several months of generalized weakness beginning in February 2021. She has intermittent bouts lasting a few weeks that are worse than others. She has felt her heart is racing for the past couple of weeks, obtaining her own pulse at home to be 140 bpm. She was seen at her clinic today for her these symptoms with labs revealing hemoglobin of 5.8 today and prompting emergency department evaluation.     She denies lightheadedness or headaches. She has a history of acid reflux, but has not had any recent issues with this and typically takes Tums as needed. She last had endoscopy in 2019 with 2 mm erosion noted, as below.  She usually passes a bowel movement once per day, but has not passed one for a day and a half. Her most recent stool was black but she denies hematochezia, hematuria, epistaxis, or abnormal bruising. She required blood transfusions in 1990s related to gynecologic bleeding and has since had a hysterectomy. She is not anticoagulated.    Laboratory Results: 09/02/21  CBC: WBC 13.1 (H), HGB 5.8 (VL),  (H)    Colonoscopy: 07/16/2019  The perianal and digital rectal examinations were normal.   A few small-mouthed diverticula were found in the sigmoid colon and distal ascending colon.   The exam was otherwise without abnormality on direct and retroflexion views.     Upper GI Endoscopy: 07/16/2019  The esophagus was normal.  A single localized, 2 mm non-bleeding erosion was found on the greater curvature of the stomach. There were no stigmata of recent bleeding.   Biopsies were taken with a cold forceps for histology.   Verification of patient identification for the specimen was done.   Estimated blood loss was minimal.   A medium-sized hiatal hernia  was present.   The examined duodenum was normal.    Review of Systems   Constitutional: Positive for fatigue.   HENT: Negative for nosebleeds.    Cardiovascular: Positive for palpitations.   Gastrointestinal: Positive for constipation. Negative for blood in stool and diarrhea.   Genitourinary: Negative for hematuria.   Neurological: Positive for weakness. Negative for light-headedness and headaches.   Hematological: Does not bruise/bleed easily.   All other systems reviewed and are negative.    Allergies:  Augmentin  Codeine Sulfate  Ivp Dye [Contrast Dye]  Sulfa Drugs  Tetracyclines    Medications:  Amlodipine   Celexa   Tums    Past Medical History:    Allergic State   Depressive Disorder   Kidney Stones  Mumps   Nephrolithiasis   Blood Transfusion   JAROD    Past Surgical History:    Abdominal Hysterectomy  Biopsy   Colonoscopy x2  Cystoscopy, Retrogrades, Urethroscopy, Laser Holmium Ureters, Right  Cystoscopy   T&A  EGD     Family History:    Mother - Hypertension, RA, Thyroid Disease, Osteoporosis, Cerebrovascular Disease, Anxiety Disorder  Father - Hypertension, CAD, Hyperlipidemia, Obesity     Social History:  Arrives to the emergency department with her  at bedside.     Physical Exam     Patient Vitals for the past 24 hrs:   BP Temp Temp src Pulse Resp SpO2   09/02/21 2235 111/45 97.5  F (36.4  C) Temporal 86 14 98 %   09/02/21 2230 121/48 -- -- -- 16 --   09/02/21 2214 (!) 142/56 97.2  F (36.2  C) Temporal 88 -- 99 %   09/02/21 2200 -- -- -- -- -- 96 %   09/02/21 2145 -- 98  F (36.7  C) Oral -- -- 94 %   09/02/21 2130 121/59 -- -- 86 -- 94 %   09/02/21 2115 108/57 -- -- 86 -- 94 %   09/02/21 2110 -- -- -- -- -- 94 %   09/02/21 2105 108/55 98.5  F (36.9  C) Oral 82 18 99 %   09/02/21 2100 120/55 -- -- 83 -- 92 %   09/02/21 2055 126/56 97.8  F (36.6  C) Oral 84 20 93 %   09/02/21 2050 126/56 -- -- -- -- 92 %   09/02/21 2045 122/55 -- -- -- -- 94 %   09/02/21 2040 -- -- -- 87 -- 93 %   09/02/21 2030 -- --  -- -- -- 92 %   09/02/21 2020 -- -- -- 93 -- 94 %   09/02/21 2015 125/70 -- -- -- -- 92 %   09/02/21 2010 -- -- -- -- -- 91 %   09/02/21 2000 126/65 -- -- 86 -- 94 %   09/02/21 1945 119/54 -- -- -- -- 100 %   09/02/21 1930 -- -- -- -- -- 98 %   09/02/21 1915 118/52 -- -- -- -- 98 %   09/02/21 1900 130/53 -- -- 89 -- 100 %   09/02/21 1734 (!) 152/88 99.6  F (37.6  C) Temporal 110 20 97 %       Physical Exam  General: Well-developed and well-nourished. Well appearing middle aged  woman. Cooperative.  Head:  Atraumatic.  Eyes:  Conjunctivae, lids, and sclerae are normal.  Neck:  Supple. Normal range of motion.  CV:  Regular rate and rhythm. Normal heart sounds with no murmurs, rubs, or gallops detected.  Resp:  No respiratory distress. Clear to auscultation bilaterally without decreased breath sounds, wheezing, rales, or rhonchi.  GI:  Soft. Non-distended. Non-tender.    Rectal: NIMCO with return of dark brown, nonbloody stool.  MS:  Normal ROM. No bilateral lower extremity edema.  Skin:  Warm. Non-diaphoretic. No pallor.  Neuro:  Awake. A&Ox3. Normal strength.  Psych: Normal mood and affect. Normal speech.  Vitals reviewed.    Emergency Department Course   EKG  Indication: Palpitations  Time: 1749  Rate 100 bpm. OH interval 134. QRS duration 78. QT/QTc 316/407.   Normal sinus rhythm   Nonspecific ST abnormality    Abnormal ECG  No acute ST changes.  T wave III, AVF now inverted as compared to prior, dated 06/20/2019    Laboratory:  CBC: WBC 12.6 (H), HGB 5.4 (VL),  (H)   CMP: Glucose 109 (H), Albumin 3.3 (L), o/w WNL (Creatinine: 0.73)    Occult Stool: Positive (A)  AB0/Rh type and screen: O Rh-. Antibodies Negative  Asymptomatic COVID19 Virus PCR by nasopharyngeal swab: Negative     Emergency Department Course:    Reviewed:  I reviewed nursing notes, vitals, past medical history, and Care Everywhere.    Assessments:  1750 I obtained history and examined the patient as noted above.   2035 Dr. Urban is at  bedside.     Consults:   2015 I consulted with Dr. Urban, hospitalist, regarding the patient's history and presentation here in the emergency department.    Interventions:  1821 0.9% NaCl bolus 1,000 mL IV  1850 Protonix 40 mg IV   pRBC 2 Units     Disposition:  The patient was admitted to the hospital under the care of Dr. Urban.     Impression & Plan     Medical Decision Making:  Berna is a 61 year old woman who has had fatigue and exertional dyspnea for what sounds like several months.  It seems to have worsened over the last couple of weeks and she is very concerned because she has noted her heart rate to be elevated.  She was seen by primary care and labs revealed anemia which prompted her emergency department visit.  It should be noted that she had endoscopy in 2019 which showed a 2 mm erosive, nonbleeding lesion.  She remarks her last bowel movement was quite dark.  She appears well on exam with tachycardia that resolved with rest and IV fluids.  She is Hemoccult positive and was given Protonix for presumptive upper GI bleed with hemoglobin here being 5.4.  I obtained consent and she was given 2 units of pRBCs.  She has no kidney injury or electrolyte derangements and no evidence of transaminitis or biliary obstruction.  EKG is reassuring without ischemic changes.  Clearly this woman requires admission for attention to her acute anemia and likely upper GI bleed work-up.  I discussed findings and plan with her and answered all her questions.  She verbalized understanding and is amenable.  I discussed the patient's case with Dr. Urban, hospitalist, who accepts admission and has no further orders.    Diagnosis:    ICD-10-CM    1. Anemia due to blood loss, acute  D62    2. UGIB (upper gastrointestinal bleed)  K92.2      Scribe Disclosure:  SEBASTIEN, Butch Brown, am serving as a scribe at 5:47 PM on 9/2/2021 to document services personally performed by Saima Stratton MD based on my observations and the provider's  statements to me.            Saima Stratton MD  09/03/21 [id number]

## 2021-09-02 NOTE — PATIENT INSTRUCTIONS
Thanks for coming in today.    Please start amlodipine to better control your blood pressure, and omeprazole to help with your gastric ulcers.    We'll be in touch regarding your lab results.     Please schedule your mammogram.

## 2021-09-02 NOTE — ED TRIAGE NOTES
Pt arrives with c/o abnormal labs discovered today, a hgb of 5.8 was found in clinic today. Pt endorses palpitations, weakness, and fatigue. .

## 2021-09-03 LAB
ALBUMIN SERPL-MCNC: 3.2 G/DL (ref 3.4–5)
ALP SERPL-CCNC: 109 U/L (ref 40–150)
ALT SERPL W P-5'-P-CCNC: 21 U/L (ref 0–50)
ANION GAP SERPL CALCULATED.3IONS-SCNC: 10 MMOL/L (ref 3–14)
ANION GAP SERPL CALCULATED.3IONS-SCNC: 4 MMOL/L (ref 3–14)
AST SERPL W P-5'-P-CCNC: 16 U/L (ref 0–45)
ATRIAL RATE - MUSE: 100 BPM
BILIRUB SERPL-MCNC: 0.7 MG/DL (ref 0.2–1.3)
BLD PROD TYP BPU: NORMAL
BLOOD COMPONENT TYPE: NORMAL
BUN SERPL-MCNC: 14 MG/DL (ref 7–30)
BUN SERPL-MCNC: 9 MG/DL (ref 7–30)
CALCIUM SERPL-MCNC: 8.8 MG/DL (ref 8.5–10.1)
CALCIUM SERPL-MCNC: 9.1 MG/DL (ref 8.5–10.1)
CHLORIDE BLD-SCNC: 104 MMOL/L (ref 94–109)
CHLORIDE BLD-SCNC: 111 MMOL/L (ref 94–109)
CO2 SERPL-SCNC: 23 MMOL/L (ref 20–32)
CO2 SERPL-SCNC: 26 MMOL/L (ref 20–32)
CODING SYSTEM: NORMAL
CREAT SERPL-MCNC: 0.76 MG/DL (ref 0.52–1.04)
CREAT SERPL-MCNC: 0.78 MG/DL (ref 0.52–1.04)
CROSSMATCH: NORMAL
DEPRECATED CALCIDIOL+CALCIFEROL SERPL-MC: 12 UG/L (ref 20–75)
DIASTOLIC BLOOD PRESSURE - MUSE: NORMAL MMHG
GFR SERPL CREATININE-BSD FRML MDRD: 82 ML/MIN/1.73M2
GFR SERPL CREATININE-BSD FRML MDRD: 84 ML/MIN/1.73M2
GLUCOSE BLD-MCNC: 109 MG/DL (ref 70–99)
GLUCOSE BLD-MCNC: 109 MG/DL (ref 70–99)
HGB BLD-MCNC: 6.6 G/DL (ref 11.7–15.7)
HGB BLD-MCNC: 6.7 G/DL (ref 11.7–15.7)
HGB BLD-MCNC: 8.2 G/DL (ref 11.7–15.7)
HGB BLD-MCNC: 8.4 G/DL (ref 11.7–15.7)
INTERPRETATION ECG - MUSE: NORMAL
ISSUE DATE AND TIME: NORMAL
P AXIS - MUSE: 28 DEGREES
POTASSIUM BLD-SCNC: 3.8 MMOL/L (ref 3.4–5.3)
POTASSIUM BLD-SCNC: 4 MMOL/L (ref 3.4–5.3)
PR INTERVAL - MUSE: 134 MS
PROT SERPL-MCNC: 7 G/DL (ref 6.8–8.8)
QRS DURATION - MUSE: 78 MS
QT - MUSE: 316 MS
QTC - MUSE: 407 MS
R AXIS - MUSE: 27 DEGREES
SODIUM SERPL-SCNC: 137 MMOL/L (ref 133–144)
SODIUM SERPL-SCNC: 141 MMOL/L (ref 133–144)
SYSTOLIC BLOOD PRESSURE - MUSE: NORMAL MMHG
T AXIS - MUSE: -3 DEGREES
TSH SERPL DL<=0.005 MIU/L-ACNC: 1.4 MU/L (ref 0.4–4)
UNIT ABO/RH: NORMAL
UNIT NUMBER: NORMAL
UNIT STATUS: NORMAL
UNIT TYPE ISBT: 9500
UPPER GI ENDOSCOPY: NORMAL
VENTRICULAR RATE- MUSE: 100 BPM

## 2021-09-03 PROCEDURE — P9016 RBC LEUKOCYTES REDUCED: HCPCS | Performed by: HOSPITALIST

## 2021-09-03 PROCEDURE — 250N000013 HC RX MED GY IP 250 OP 250 PS 637: Performed by: HOSPITALIST

## 2021-09-03 PROCEDURE — 43235 EGD DIAGNOSTIC BRUSH WASH: CPT | Performed by: INTERNAL MEDICINE

## 2021-09-03 PROCEDURE — C9113 INJ PANTOPRAZOLE SODIUM, VIA: HCPCS | Performed by: HOSPITALIST

## 2021-09-03 PROCEDURE — 250N000011 HC RX IP 250 OP 636: Performed by: INTERNAL MEDICINE

## 2021-09-03 PROCEDURE — 36415 COLL VENOUS BLD VENIPUNCTURE: CPT | Performed by: HOSPITALIST

## 2021-09-03 PROCEDURE — 250N000009 HC RX 250: Performed by: INTERNAL MEDICINE

## 2021-09-03 PROCEDURE — 85018 HEMOGLOBIN: CPT | Performed by: HOSPITALIST

## 2021-09-03 PROCEDURE — 250N000011 HC RX IP 250 OP 636: Performed by: HOSPITALIST

## 2021-09-03 PROCEDURE — 86923 COMPATIBILITY TEST ELECTRIC: CPT | Performed by: HOSPITALIST

## 2021-09-03 PROCEDURE — 120N000001 HC R&B MED SURG/OB

## 2021-09-03 PROCEDURE — G0500 MOD SEDAT ENDO SERVICE >5YRS: HCPCS | Performed by: INTERNAL MEDICINE

## 2021-09-03 PROCEDURE — 99233 SBSQ HOSP IP/OBS HIGH 50: CPT | Performed by: HOSPITALIST

## 2021-09-03 PROCEDURE — 258N000003 HC RX IP 258 OP 636: Performed by: HOSPITALIST

## 2021-09-03 PROCEDURE — 0DJ08ZZ INSPECTION OF UPPER INTESTINAL TRACT, VIA NATURAL OR ARTIFICIAL OPENING ENDOSCOPIC: ICD-10-PCS | Performed by: INTERNAL MEDICINE

## 2021-09-03 PROCEDURE — 80048 BASIC METABOLIC PNL TOTAL CA: CPT | Performed by: HOSPITALIST

## 2021-09-03 RX ORDER — PANTOPRAZOLE SODIUM 40 MG/1
40 TABLET, DELAYED RELEASE ORAL
Status: DISCONTINUED | OUTPATIENT
Start: 2021-09-03 | End: 2021-09-04 | Stop reason: HOSPADM

## 2021-09-03 RX ORDER — FENTANYL CITRATE 50 UG/ML
25 INJECTION, SOLUTION INTRAMUSCULAR; INTRAVENOUS EVERY 5 MIN PRN
Status: DISCONTINUED | OUTPATIENT
Start: 2021-09-03 | End: 2021-09-04 | Stop reason: HOSPADM

## 2021-09-03 RX ORDER — FENTANYL CITRATE 50 UG/ML
50 INJECTION, SOLUTION INTRAMUSCULAR; INTRAVENOUS
Status: COMPLETED | OUTPATIENT
Start: 2021-09-03 | End: 2021-09-03

## 2021-09-03 RX ORDER — NALOXONE HYDROCHLORIDE 0.4 MG/ML
0.2 INJECTION, SOLUTION INTRAMUSCULAR; INTRAVENOUS; SUBCUTANEOUS
Status: DISCONTINUED | OUTPATIENT
Start: 2021-09-03 | End: 2021-09-04 | Stop reason: HOSPADM

## 2021-09-03 RX ORDER — NALOXONE HYDROCHLORIDE 0.4 MG/ML
0.4 INJECTION, SOLUTION INTRAMUSCULAR; INTRAVENOUS; SUBCUTANEOUS
Status: DISCONTINUED | OUTPATIENT
Start: 2021-09-03 | End: 2021-09-04 | Stop reason: HOSPADM

## 2021-09-03 RX ORDER — FLUMAZENIL 0.1 MG/ML
0.2 INJECTION, SOLUTION INTRAVENOUS
Status: ACTIVE | OUTPATIENT
Start: 2021-09-03 | End: 2021-09-04

## 2021-09-03 RX ORDER — FLUMAZENIL 0.1 MG/ML
0.2 INJECTION, SOLUTION INTRAVENOUS
Status: DISCONTINUED | OUTPATIENT
Start: 2021-09-03 | End: 2021-09-04 | Stop reason: HOSPADM

## 2021-09-03 RX ORDER — LIDOCAINE 40 MG/G
CREAM TOPICAL
Status: DISCONTINUED | OUTPATIENT
Start: 2021-09-03 | End: 2021-09-03 | Stop reason: HOSPADM

## 2021-09-03 RX ADMIN — SODIUM CHLORIDE: 9 INJECTION, SOLUTION INTRAVENOUS at 03:51

## 2021-09-03 RX ADMIN — MIDAZOLAM 0.5 MG: 1 INJECTION INTRAMUSCULAR; INTRAVENOUS at 12:50

## 2021-09-03 RX ADMIN — MIDAZOLAM 1 MG: 1 INJECTION INTRAMUSCULAR; INTRAVENOUS at 12:36

## 2021-09-03 RX ADMIN — PANTOPRAZOLE SODIUM 40 MG: 40 INJECTION, POWDER, FOR SOLUTION INTRAVENOUS at 08:15

## 2021-09-03 RX ADMIN — ACETAMINOPHEN 650 MG: 325 TABLET, FILM COATED ORAL at 08:27

## 2021-09-03 RX ADMIN — PANTOPRAZOLE SODIUM 40 MG: 40 INJECTION, POWDER, FOR SOLUTION INTRAVENOUS at 00:01

## 2021-09-03 RX ADMIN — FENTANYL CITRATE 50 MCG: 50 INJECTION, SOLUTION INTRAMUSCULAR; INTRAVENOUS at 12:44

## 2021-09-03 RX ADMIN — MIDAZOLAM 1 MG: 1 INJECTION INTRAMUSCULAR; INTRAVENOUS at 12:41

## 2021-09-03 RX ADMIN — FENTANYL CITRATE 50 MCG: 50 INJECTION, SOLUTION INTRAMUSCULAR; INTRAVENOUS at 12:36

## 2021-09-03 RX ADMIN — TOPICAL ANESTHETIC 1 SPRAY: 200 SPRAY DENTAL; PERIODONTAL at 12:36

## 2021-09-03 RX ADMIN — SODIUM CHLORIDE: 9 INJECTION, SOLUTION INTRAVENOUS at 16:28

## 2021-09-03 RX ADMIN — MIDAZOLAM 0.5 MG: 1 INJECTION INTRAMUSCULAR; INTRAVENOUS at 12:47

## 2021-09-03 RX ADMIN — PANTOPRAZOLE SODIUM 40 MG: 40 TABLET, DELAYED RELEASE ORAL at 16:27

## 2021-09-03 ASSESSMENT — ANXIETY QUESTIONNAIRES: GAD7 TOTAL SCORE: 11

## 2021-09-03 ASSESSMENT — ACTIVITIES OF DAILY LIVING (ADL)
ADLS_ACUITY_SCORE: 13

## 2021-09-03 ASSESSMENT — ENCOUNTER SYMPTOMS
CONSTIPATION: 1
DIARRHEA: 0

## 2021-09-03 NOTE — PLAN OF CARE
Pt is A&Ox4. VSS. No nausea or vomiting. No SOB. Fatigue reported with walking to the bathroom. Intermittent headache, relieved with PRN tylenol. Critical lab 6.7. 1 unit of blood given. Pt tolerated well. NPO for procedure. Upper endoscopy completed. Started on clear liquids and will advance to a regular diet as tolerated. Up SBA to the bathroom. Will continue to monitor hbg. Plan to discharge tomorrow to home.

## 2021-09-03 NOTE — OR NURSING
Patient to Endoscopy for EGD.  VSS throughout, O2 3L/NC during procedure, returned to RA, Sats in mid 90's.  Received 3mg Versed and 100mcg Fentanyl.  Tolerated procedure well.  Hiatal hernia written education given to patient with verbal education as well.  Report given to JOSE Preciado.

## 2021-09-03 NOTE — PROCEDURES
PRE-PROCEDURE H&P    CHIEF COMPLAINT / REASON FOR PROCEDURE:  melena    PERTINENT HISTORY :    Past Medical History:   Diagnosis Date     Allergic state      Depressive disorder 2003 - 2004    treated with Celexa     History of blood transfusion     prior to hyst     Kidney stones 1989, 2007,1998,2003,2007     Mumps      Nephrolithiasis 3/2/2015     PONV (postoperative nausea and vomiting)     n/v postop      Past Surgical History:   Procedure Laterality Date     ABDOMEN SURGERY  Feb 1996    Abdominal Hysterectomy     BIOPSY  July 2013    mole on back     COLONOSCOPY  08/02/2013    Dr. Sow [facility]     COLONOSCOPY  8/2/2013     COLONOSCOPY  07/16/2019    Dr. Sow [facility]     COLONOSCOPY N/A 7/16/2019    Procedure: COLONOSCOPY;  Surgeon: Nikita Sow MD;  Location:  GI     COMBINED CYSTOSCOPY, RETROGRADES, URETEROSCOPY, LASER HOLMIUM LITHOTRIPSY URETER(S), INSERT STENT Right 3/3/2015    Procedure: COMBINED CYSTOSCOPY, RETROGRADES, URETEROSCOPY, LASER HOLMIUM LITHOTRIPSY URETER(S), INSERT STENT;  Surgeon: Carlos Samson MD;  Location:  OR     CYSTOSCOPY       ENT SURGERY  1964    T&A as a child     ESOPHAGOSCOPY, GASTROSCOPY, DUODENOSCOPY (EGD), COMBINED  07/16/2019    Dr. Sow [facility]     ESOPHAGOSCOPY, GASTROSCOPY, DUODENOSCOPY (EGD), COMBINED N/A 7/16/2019    Procedure: ESOPHAGOGASTRODUODENOSCOPY, WITH BIOPSY;  Surgeon: Nikita Sow MD;  Location:  GI     GENITOURINARY SURGERY  3/3/15 Dr Samson    Kidney Stone Removed - Cystoscopy  Right side     HEAD & NECK SURGERY  1975    wisdom teeth removed     HYSTERECTOMY, PAP NO LONGER INDICATED       LASER HOLMIUM LITHOTRIPSY URETER(S), INSERT STENT, COMBINED Left 6/20/2019    Procedure: 1. Cystourethroscopy 2. Left ureteroscopy with holmium laser lithotripsy and stone extraction 3. Left retrograde pyelography with interpretation of intraoperative fluoroscopic imaging 4. Left ureteral stent placement;  Surgeon: Willis Reid MD;   Location: [facility] OR     ORTHOPEDIC SURGERY  May 2015    Broken ankle - left     SOFT TISSUE SURGERY  1982    left wrist ganglion cyst     SOFT TISSUE SURGERY  1981    bilateral ingrown toenail removal surgery         Bleeding tendencies:  No    Relevant Family History:  NONE     Relevant Social History:  NONE      A relevant review of systems was performed and was negative      ALLERGIES/SENSITIVITIES:   Allergies   Allergen Reactions     Augmentin Rash     Codeine Sulfate Rash     Ivp Dye [Contrast Dye] Rash     Sulfa Drugs Rash     Tetracyclines Rash       CURRENT MEDICATIONS:   No current outpatient medications on file.        PRE-SEDATION ASSESSMENT:    Lung Exam:  normal  Heart Exam:  normal  Airway Exam: normal  Previous reaction to anesthesia/sedation:   No  Sedation plan based on assessment: Moderate (conscious) sedation  ASA Classification:  1 - Healthy patient, no medical problems      IMPRESSION:  melena    PLAN:  EGD     Nneka Littlejohn MD  [facility]  Office: [phone]

## 2021-09-03 NOTE — PROVIDER NOTIFICATION
DATE:  9/3/2021   TIME OF RECEIPT FROM LAB:  0025  LAB TEST:  hgb  LAB VALUE:  6.6  RESULTS TEXTED TO ADMITTING HOSPITALIST  TIME LAB VALUE REPORTED TO PROVIDER:   now

## 2021-09-03 NOTE — PROGRESS NOTES
[facility]    Hospitalist Progress Note  Name: Berna Lee    MRN: [id number]  Provider:  Maykel Bender DO, MPH  Date of Service: 09/03/2021    Summary of Stay: Berna Lee is a 61 year old female with a past medical history of depression/anxiety who presents with generalized weakness and fatigue.     #Acute blood anemia suspect secondary to UGIB: Patient has noted generalized weakness over the last several months.  She has noted acceleration of her symptoms over the last couple weeks.  3 days prior to admission she had onset of epigastric abdominal pain.  The next day she did have black tarry stool.  No BM since that time.  She has noted palpitations and racing of the heart.  She does endorse lightheadedness but no loss of consciousness.  She does note some shortness of breath with exertion particularly going up stairs.  No chest pain, fevers chills, sick contacts.  Patient was initially seen by her PCP and found to have a hemoglobin of 5.8.  -She had a colonoscopy and upper GI back in July 2019.  EGD at that time showed a nonbleeding erosion in the greater curvature of the stomach without active bleeding.    -ER, patient afebrile and hemodynamically stable.  CBC with hemoglobin of 5.4, platelets 467.  BMP showed normal kidney function.  LFTs within normal limits.  Occult blood was positive.  Patient given IV PPI, IV fluid and 2 units of PRBCs.  -IV PPI twice daily for now, NPO status.  IV fluids.  -Patient being transfused 2 units PRBCs in the ED.  Hemoglobin only 6.7 this morning so will give 1 more unit(s) PRBC.  Continue hemoglobin every 6 hours.  Transfuse for hemoglobin less than 7.0 or instability.    -Gastroenterology consulted, anticipate EGD today.     #HTN: Hold amlodipine that was started by PCP recently.  #Depression: Denies SI.  Follow up with PCP.  #Obesity: Complicates cares.    DVT Prophylaxis: Pneumatic Compression Devices  Code Status: Full Code  Diet: NPO per  Anesthesia Guidelines for Procedure/Surgery Except for: Meds    Ashley Catheter: Not present  Disposition: Expected discharge in 1-2 days to home. Goals prior to discharge include GI evaluation.   Incidental Findings: None.  Family updated today: No.     Interval History   Assumed care from previous hospitalist. The history was fully reviewed.  The patient reports doing well. No chest pain or shortness of breath. No nausea, vomiting, diarrhea, constipation. No fevers. No other specific complaints identified.     -Data reviewed today: I personally reviewed all new labs and imaging results over the last 24 hours.     Physical Exam   Temp: 98.2  F (36.8  C) Temp src: Temporal BP: 122/61 Pulse: 86   Resp: 16 SpO2: 97 % O2 Device: None (Room air)    Vitals:    09/03/21 0726   Weight: 108.5 kg (239 lb 1.6 oz)     Vital Signs with Ranges  Temp:  [97.2  F (36.2  C)-99.6  F (37.6  C)] 98.2  F (36.8  C)  Pulse:  [] 86  Resp:  [14-20] 16  BP: (108-152)/(38-88) 122/61  SpO2:  [91 %-100 %] 97 %  I/O last 3 completed shifts:  In: 1887 [P.O.:150; I.V.:1037]  Out: 1050 [Urine:1050]    GENERAL: No apparent distress. Awake, alert, and fully oriented.  HEENT: Normocephalic, atraumatic. Extraocular movements intact.  CARDIOVASCULAR: Regular rate and rhythm without murmurs or rubs. No S3.  PULMONARY: Clear bilaterally.  GASTROINTESTINAL: Soft, non-tender, non-distended. Bowel sounds normoactive.   EXTREMITIES: No cyanosis or clubbing. No edema.  NEUROLOGICAL: CN 2-12 grossly intact, no focal neurological deficits.  DERMATOLOGICAL: No rash, ulcer, bruising, nor jaundice.     Medications     sodium chloride 100 mL/hr at 09/03/21 0815       pantoprazole (PROTONIX) IV  40 mg Intravenous BID     sodium chloride (PF)  3 mL Intracatheter Q8H     Data     Laboratory:  Recent Labs   Lab 09/03/21  0758 09/03/21  0006 09/02/21  1816 09/02/21  1607   WBC  --   --  12.6* 13.1*   HGB 6.7* 6.6* 5.4* 5.8*   HCT  --   --  21.1* 23.1*   MCV  --   --   70* 72*   PLT  --   --  467* 520*     Recent Labs   Lab 09/03/21  0758 09/02/21  1816    136   POTASSIUM 3.8 4.2   CHLORIDE 111* 105   CO2 26 24   ANIONGAP 4 7   * 109*   BUN 9 14   CR 0.78 0.73   GFRESTIMATED 82 89   JULEE 8.8 8.8     No results for input(s): CULT in the last 168 hours.    Imaging:  No results found for this or any previous visit (from the past 24 hour(s)).      Maykel Bender DO MPH  [facility] Hospitalist  [address] [place] [address]  09/03/2021

## 2021-09-03 NOTE — PLAN OF CARE
Pt A/O x4, VSS. Received 1 unit of blood before shift and 1 unit during shift. Hgb check at midnight 6.6. No signs of transfusion reaction. NPO. Voiding. SBA-ambulated to bathroom. Will continue to monitor.

## 2021-09-03 NOTE — ED NOTES
[facility]  ED Nurse Handoff Report    Berna Lee is a 61 year old female   ED Chief complaint: Generalized Weakness, Fatigue, and Abnormal Labs  . ED Diagnosis:   Final diagnoses:   None     Allergies:   Allergies   Allergen Reactions     Augmentin Rash     Codeine Sulfate Rash     Ivp Dye [Contrast Dye] Rash     Sulfa Drugs Rash     Tetracyclines Rash       Code Status: Full Code  Activity level - Baseline/Home:  Independent. Activity Level - Current:   Stand by Assist. Lift room needed: No. Bariatric: No   Needed: No   Isolation: No. Infection: Not Applicable.     Vital Signs:   Vitals:    09/02/21 2115 09/02/21 2130 09/02/21 2145 09/02/21 2200   BP: 108/57 121/59     Pulse: 86 86     Resp:       Temp:   98  F (36.7  C)    TempSrc:   Oral    SpO2: 94% 94% 94% 96%       Cardiac Rhythm:  ,      Pain level:    Patient confused: No. Patient Falls Risk: Yes.   Elimination Status: Has voided   Patient Report - Initial Complaint: Low hgb from clinic. Focused Assessment: Pt here from clinic after low hgb. Confirmed GIB. Needs blood. Mild dizziness when OOB.    Tests Performed:   Labs Ordered and Resulted from Time of ED Arrival Up to the Time of Departure from the ED   OCCULT BLOOD STOOL - Abnormal; Notable for the following components:       Result Value    Occult Blood Positive (*)     All other components within normal limits   COMPREHENSIVE METABOLIC PANEL - Abnormal; Notable for the following components:    Glucose 109 (*)     Albumin 3.3 (*)     All other components within normal limits   CBC WITH PLATELETS AND DIFFERENTIAL - Abnormal; Notable for the following components:    WBC Count 12.6 (*)     RBC Count 3.00 (*)     Hemoglobin 5.4 (*)     Hematocrit 21.1 (*)     MCV 70 (*)     MCH 18.0 (*)     MCHC 25.6 (*)     RDW 20.4 (*)     Platelet Count 467 (*)     NRBCs per 100 WBC 1 (*)     Absolute Neutrophils 8.8 (*)     Absolute Immature Granulocytes 0.1 (*)     All other components  within normal limits   COVID-19 VIRUS (CORONAVIRUS) BY PCR - Normal    Narrative:     Testing was performed using the lety  SARS-CoV-2 & Influenza A/B Assay on the lety  Tonie  System.  This test should be ordered for the detection of SARS-COV-2 in individuals who meet SARS-CoV-2 clinical and/or epidemiological criteria. Test performance is unknown in asymptomatic patients.  This test is for in vitro diagnostic use under the FDA EUA for laboratories certified under CLIA to perform moderate and/or high complexity testing. This test has not been FDA cleared or approved.  A negative test does not rule out the presence of PCR inhibitors in the specimen or target RNA in concentration below the limit of detection for the assay. The possibility of a false negative should be considered if the patient's recent exposure or clinical presentation suggests COVID-19.  [facility] are certified under the Clinical Laboratory Improvement Amendments of 1988 (CLIA-88) as qualified to perform moderate and/or high complexity laboratory testing.   CBC WITH PLATELETS & DIFFERENTIAL    Narrative:     The following orders were created for panel order CBC with platelets differential.  Procedure                               Abnormality         Status                     ---------                               -----------         ------                     CBC with platelets and d...[[id number]]  Abnormal            Final result                 Please view results for these tests on the individual orders.   EXTRA BLUE TOP TUBE   EXTRA RED TOP TUBE   EXTRA GREEN TOP (LITHIUM HEPARIN) TUBE   EXTRA PURPLE TOP TUBE   EXTRA BLOOD BANK PURPLE TOP TUBE   EXTRA BLOOD BANK PURPLE TOP TUBE   LACTIC ACID WHOLE BLOOD   PERIPHERAL IV CATHETER   CARDIAC CONTINUOUS MONITORING   PULSE OXIMETRY NURSING   TYPE AND SCREEN, ADULT   PREPARE RED BLOOD CELLS (UNIT)   PREPARE RED BLOOD CELLS (UNIT)   PREPARE RED BLOOD CELLS (UNIT)   TRANSFUSE RED  BLOOD CELLS (UNIT)   ABO/RH TYPE AND SCREEN    Narrative:     The following orders were created for panel order ABO/Rh type and screen.  Procedure                               Abnormality         Status                     ---------                               -----------         ------                     Adult Type and Screen[[id number]]                            Edited Result - FINAL        Please view results for these tests on the individual orders.   EXTRA TUBE    Narrative:     The following orders were created for panel order [place] Draw.  Procedure                               Abnormality         Status                     ---------                               -----------         ------                     Extra Blue Top Tube[[id number]]                              Final result               Extra Red Top Tube[[id number]]                               Final result               Extra Green Top (Lithium...[[id number]]                      Final result               Extra Purple Top Tube[[id number]]                            Final result               Extra Blood Bank Purple ...[[id number]]                      Final result               Extra Blood Bank Purple ...[[id number]]                      Final result                 Please view results for these tests on the individual orders.     . Abnormal Results: Hgb 5.4.   Treatments provided:   No orders to display     Family Comments:  at bedside  OBS brochure/video discussed/provided to patient:  No  ED Medications:   Medications   0.9% sodium chloride BOLUS (has no administration in time range)   0.9% sodium chloride BOLUS (0 mLs Intravenous Stopped 9/2/21 1937)   pantoprazole (PROTONIX) IV push injection 40 mg (40 mg Intravenous Given 9/2/21 1850)     Drips infusing:  No  For the majority of the shift, the patient's behavior Green. Interventions performed were none.    Sepsis treatment initiated: No     Patient tested for COVID 19 prior to  admission: YES- negative    ED Nurse Name/Phone Number: Zuleima Guillen RN,   8:14 PM  RECEIVING UNIT ED HANDOFF REVIEW    Above ED Nurse Handoff Report was reviewed: Yes  Reviewed by: Melina Samuel RN on September 2, 2021 at 9:53 PM

## 2021-09-03 NOTE — PROVIDER NOTIFICATION
DATE:  9/3/2021   TIME OF RECEIPT FROM LAB:  0840  LAB TEST:  hgb  LAB VALUE:  6.7  RESULTS GIVEN WITH READ-BACK TO (PROVIDER):  Paged results to Dr. Maykel Bender at 0842  TIME LAB VALUE REPORTED TO PROVIDER:   0825

## 2021-09-03 NOTE — PLAN OF CARE
Pt arrived on floor at 2230 with first unit blood infusing and completed. Next unit ordered. VSS. Admission questions answered by patient. Pt ambulated in room w/ SBA. A/o x4. NPO at midnight. Continue monitoring.

## 2021-09-03 NOTE — PHARMACY-ADMISSION MEDICATION HISTORY
Admission medication history interview status for this patient is complete. See [facility] admission navigator for allergy information, prior to admission medications and immunization status.     Medication history interview done, indicate source(s): Patient  Medication history resources (including written lists, pill bottles, clinic record):None  Pharmacy: OK using our [facility] Rx for any discharge meds    Changes made to PTA medication list:  Added: n/a  Changed: econazole cream to PRN  Reported as Not Taking: prescribed in clinic today 9/2 and not yet started: omeprazole, citalopram, amlodipine  Removed: n/a    Actions taken by pharmacist (provider contacted, etc):None     Additional medication history information:None    Medication reconciliation/reorder completed by provider prior to medication history?  N   (Y/N)     Prior to Admission medications    Medication Sig Last Dose Taking? Auth Provider   econazole nitrate 1 % cream Apply topically daily  Patient taking differently: Apply topically daily as needed Fungal rash Past Month at PRN only Yes Bing Fung MD   Fexofenadine HCl (ALLEGRA ALLERGY PO) Take 180 mg by mouth At Bedtime  9/1/2021 at pm Yes Reported, Patient   Nasal Dilators (BREATHE RIGHT)  9/1/2021 at pm Yes Reported, Patient   PROBIOTIC PRODUCT PO Take 1 capsule by mouth daily  Past Month at ran out Yes Reported, Patient   amLODIPine (NORVASC) 5 MG tablet Take 1 tablet (5 mg) by mouth daily  at Not yet started  Bing Fung MD   citalopram (CELEXA) 10 MG tablet Take 1 tablet (10 mg) by mouth daily  at Not yet started  Bing Fung MD   omeprazole (PRILOSEC) 20 MG DR capsule Take 1 capsule (20 mg) by mouth daily  at Not yet started  Bing Fung MD

## 2021-09-03 NOTE — CONSULTS
[facility]  [facility]  Gastroenterology Consultation    Berna Lee  [address] [place] [address]  61 year old female    Admission Date/Time: 9/2/2021  Primary Care Provider: Bing Fung    We were asked to see the patient in consultation by Dr. Urban for evaluation of melena and anemia.    HPI:  Berna Lee is a 61 year old female with PMH including depression, anxiety, obesity, erosive gastropathy who was admitted 9/2/21 with anemia. She had been experiencing fatigue, weakness, and dyspnea on exertion over the past several weeks. She was her PCP and was found to have hemoglobin 5.8 on outpatient labs, so she was referred to the ED. Patient also developed an episode or two of melena earlier this week, but has not moved bowels in 3 days. She denies nausea, vomiting, or abdominal pain.    Hemoglobin 5.4 on presentation to ED with MCV 70, ferritin 5, iron 23, TIBC 431, iron sat 5%.    Patient had EGD and colonoscopy in 2019 for iron deficiency anemia. EGD showed erosive gastropathy (H.pylori negative). Colonoscopy showed diverticulosis. Patient stopped taking NSAIDs following this. She is not on anticoagulation. She was prescribed PPI by her PCP but had not been taking this prior to symptom onset.    ROS: A comprehensive ten point review of systems was negative aside from those in mentioned in the HPI.      MEDICATIONS: No current facility-administered medications on file prior to encounter.  econazole nitrate 1 % cream, Apply topically daily (Patient taking differently: Apply topically daily as needed Fungal rash)  Fexofenadine HCl (ALLEGRA ALLERGY PO), Take 180 mg by mouth At Bedtime   Nasal Dilators (BREATHE RIGHT),   PROBIOTIC PRODUCT PO, Take 1 capsule by mouth daily   amLODIPine (NORVASC) 5 MG tablet, Take 1 tablet (5 mg) by mouth daily  citalopram (CELEXA) 10 MG tablet, Take 1 tablet (10 mg) by mouth daily  omeprazole (PRILOSEC) 20 MG DR capsule,  Take 1 capsule (20 mg) by mouth daily      ALLERGIES:   Allergies   Allergen Reactions     Augmentin Rash     Codeine Sulfate Rash     Ivp Dye [Contrast Dye] Rash     Sulfa Drugs Rash     Tetracyclines Rash       Past Medical History:   Diagnosis Date     Allergic state      Depressive disorder 2003 - 2004    treated with Celexa     History of blood transfusion     prior to hyst     Kidney stones 1989, 2007,1998,2003,2007     Mumps      Nephrolithiasis 3/2/2015     PONV (postoperative nausea and vomiting)     n/v postop       Past Surgical History:   Procedure Laterality Date     ABDOMEN SURGERY  Feb 1996    Abdominal Hysterectomy     BIOPSY  July 2013    mole on back     COLONOSCOPY  08/02/2013    Dr. Sow [facility]     COLONOSCOPY  8/2/2013     COLONOSCOPY  07/16/2019    Dr. Sow [facility]     COLONOSCOPY N/A 7/16/2019    Procedure: COLONOSCOPY;  Surgeon: Nikita Sow MD;  Location:  GI     COMBINED CYSTOSCOPY, RETROGRADES, URETEROSCOPY, LASER HOLMIUM LITHOTRIPSY URETER(S), INSERT STENT Right 3/3/2015    Procedure: COMBINED CYSTOSCOPY, RETROGRADES, URETEROSCOPY, LASER HOLMIUM LITHOTRIPSY URETER(S), INSERT STENT;  Surgeon: Carlos Samson MD;  Location: [facility] OR     CYSTOSCOPY       ENT SURGERY  1964    T&A as a child     ESOPHAGOSCOPY, GASTROSCOPY, DUODENOSCOPY (EGD), COMBINED  07/16/2019    Dr. Sow [facility]     ESOPHAGOSCOPY, GASTROSCOPY, DUODENOSCOPY (EGD), COMBINED N/A 7/16/2019    Procedure: ESOPHAGOGASTRODUODENOSCOPY, WITH BIOPSY;  Surgeon: Nikita Sow MD;  Location:  GI     GENITOURINARY SURGERY  3/3/15 Dr Samson    Kidney Stone Removed - Cystoscopy  Right side     HEAD & NECK SURGERY  1975    wisdom teeth removed     HYSTERECTOMY, PAP NO LONGER INDICATED       LASER HOLMIUM LITHOTRIPSY URETER(S), INSERT STENT, COMBINED Left 6/20/2019    Procedure: 1. Cystourethroscopy 2. Left ureteroscopy with holmium laser lithotripsy and stone extraction 3. Left retrograde pyelography with interpretation of  intraoperative fluoroscopic imaging 4. Left ureteral stent placement;  Surgeon: Willis Reid MD;  Location: [facility] OR     ORTHOPEDIC SURGERY  May 2015    Broken ankle - left     SOFT TISSUE SURGERY  1982    left wrist ganglion cyst     SOFT TISSUE SURGERY  1981    bilateral ingrown toenail removal surgery       SOCIAL HISTORY:  Social History     Tobacco Use     Smoking status: Passive Smoke Exposure - Never Smoker     Smokeless tobacco: Never Used     Tobacco comment: Live with a smoker   Substance Use Topics     Alcohol use: Yes     Alcohol/week: 0.0 standard drinks     Comment: occasional , social     Drug use: No       FAMILY HISTORY:  Family History   Problem Relation Age of Onset     Hypertension Mother      Arthritis Mother         rheumatoid arthritis     Thyroid Disease Mother      Osteoporosis Mother      Neurologic Disorder Mother      Genitourinary Problems Mother      Cerebrovascular Disease Mother      Anxiety Disorder Mother      Obesity Mother      Unknown/Adopted Mother         Rheumatoid Arthritis/ HypoThyroid     Hypertension Father      Lipids Father      Coronary Artery Disease Father      Hyperlipidemia Father      Obesity Father      Depression Maternal Grandmother      Thyroid Disease Maternal Grandmother      Gastrointestinal Disease Maternal Grandmother      Osteoporosis Maternal Grandmother      Diabetes Maternal Grandfather      Genitourinary Problems Maternal Grandfather      Thyroid Disease Sister      Obesity Sister      Hypertension Sister      Hyperlipidemia Sister      Thyroid Disease Sister      Obesity Sister      Unknown/Adopted Sister         Rheumatiod Arthritis/HypoThyroid     Obesity Paternal Grandmother      Unknown/Adopted Son         Sarcoidosis/Brain     Colon Cancer No family hx of        PHYSICAL EXAM:   /61   Pulse 86   Temp 98.2  F (36.8  C)   Resp 16   Wt 108.5 kg (239 lb 1.6 oz)   SpO2 97%   BMI 39.79 kg/m      Constitutional: No acute  distress.  Head: Normocephalic, atraumatic.    Neck: Neck supple. No adenopathy.  Eyes: No scleral icterus.  ENT: Hearing adequate. Pharynx normal without erythema or exudate.  Cardiovascular: RRR, normal S1/S2, no murmurs.  Respiratory: Nonlabored, CTAB.  Abdomen: Soft, obese, nondistended, nontender, no guarding/rebound.  Neuro: CN II-XII grossly intact. No focal deficits.  Extremities: No edema.  Skin: No suspicious lesions, rashes, or jaundice. Pallor.  Psychiatric: Mentation appears normal and affect normal.      ADDITIONAL COMMENTS:   I reviewed the patient's new clinical lab test results.   Recent Labs   Lab Test 09/03/21  0758 09/03/21  0006 09/02/21 1816 09/02/21  1607 10/08/19  1416   WBC  --   --  12.6* 13.1* 10.2   HGB 6.7* 6.6* 5.4* 5.8* 11.2*   MCV  --   --  70* 72* 81   PLT  --   --  467* 520* 409     Recent Labs   Lab Test 09/03/21  0758 09/02/21  1816 06/15/19  0601    136 138   POTASSIUM 3.8 4.2 3.9   CHLORIDE 111* 105 105   CO2 26 24 26   BUN 9 14 13   CR 0.78 0.73 0.92   ANIONGAP 4 7 7   JULEE 8.8 8.8 8.4*   * 109* 114*     Recent Labs   Lab Test 09/02/21 1816 07/01/19  1001 06/14/19  1622 06/09/19  1601 06/26/18  1147   ALBUMIN 3.3*  --   --  3.5 3.7   BILITOTAL 0.6  --   --  0.3 0.4   ALT 19  --   --  20 16   AST 32  --   --  14 11   ALKPHOS 112  --   --  119 117   PROTEIN  --  30* Negative 30*  --    LIPASE  --   --   --  89  --        IMAGING/ENDOSCOPY  No pertinent results.    CONSULTATION ASSESSMENT AND PLAN:    Berna Lee is a 61-year-old female with PMH as mentioned above who was admitted 9/2/21 with anemia. Found to have hemoglobin 5.8 on outpatient labs, from 10.2 in 2019. Labs are consistent with iron deficiency.    1) Iron deficiency anemia: Suspect gastric erosion/ulcer with history of erosive gastropathy in 2019, although patient has not been taking NSAIDs. Lower GI source less likely with negative colonoscopy in 2019. Patient had 1-2 episodes of melena earlier  this week which has since subsided. Hemoglobin remains low at 6.7 this morning and she is pending another transfusion. Plan EGD later today.          - Monitor hemoglobin; transfuse PRN.          - Monitor stool output.          - PPI.          - EGD today.          - Further recommendations pending EGD results.    I discussed the patient plan with Dr. Littlejohn. Thank you for asking us to participate in the care of this patient.    Payton Kilgore PA-C  [facility] ([facility])     -----------------------------------------------  I agree with the assessment and plan of Payton ADAMS.  Patient had black stools a couple of days ago, stomach feels upset but no pain.  No use of NSAIDs.  On exam she is pale, abd is obese, soft, non tender.  A/P- anemia, melena, history of gastric erosions.  Differential diagnosis includes AVMs, PUD, erosions, less likely malignancy.  EGD now,    Nice Nneka Littlejohn MD  [facility]

## 2021-09-03 NOTE — H&P
[facility]    History and Physical  Hospitalist       Date of Admission:  9/2/2021    Assessment & Plan   Berna Lee is a 61 year old female with a past medical history of depression/anxiety who presents with generalized weakness and fatigue.    #Acute blood anemia suspect secondary to UGIB: Patient has noted generalized weakness over the last several months.  She has noted acceleration of her symptoms over the last couple weeks.  3 days prior to admission she had onset of epigastric abdominal pain.  The next day she did have black tarry stool.  No BM since that time.  She has noted palpitations and racing of the heart.  She does endorse lightheadedness but no loss of consciousness.  She does note some shortness of breath with exertion particularly going up stairs.  No chest pain, fevers chills, sick contacts.  Patient was initially seen by her PCP and found to have a hemoglobin of 5.8.  -She had a colonoscopy and upper GI back in July 2019.  EGD at that time showed a nonbleeding erosion in the greater curvature of the stomach without active bleeding.    -ER, patient afebrile and hemodynamically stable.  CBC with hemoglobin of 5.4, platelets 467.  BMP showed normal kidney function.  LFTs within normal limits.  Occult blood was positive.  Patient given IV PPI, IV fluid and 2 units of PRBCs.  -IV PPI twice daily, clear liquids tonight and n.p.o. after midnight.  IV fluids.  -Patient being transfused 2 units PRBCs in the ED.  Recheck hemoglobin every 6 hours.  Transfuse for hemoglobin less than 7.0.  Conditional transfuse order placed.   -Gastroenterology consulted    #HTN: Hold amlodipine that was started by PCP today  #Depression: Denies SI.  Follow up with PCP  #Obesity: Complicates cares    DVT Prophylaxis: Pneumatic Compression Devices  Code Status: Full Code  Dispo: Admit to inpatient.     Scotty Urban MD    Primary Care Physician   Bing Fung    Chief Complaint    Generalized weakness, abnormal labs    History is obtained from the patient, patient's chart discussed with ER physician    History of Present Illness   Berna Lee is a 61 year old female with a past medical history of depression/anxiety who presents with generalized weakness and fatigue.    Ms. Destiny Lee has had several months of generalized weakness.  Started to notice back in February 2021.  It seems that her symptoms have been getting worse over the last few weeks.  She has noted palpitations and racing of the heart.  She has had some lightheadedness particularly when going up stairs.  No loss of consciousness.  She has a history of acid reflux.  She did note some epigastric abdominal pain on Monday and then had a black tarry stool on Tuesday.  No BM since.  She was seen in clinic today for her symptoms and found to have a hemoglobin of 5.8.  They sent her to the ED for evaluation.  She had a colonoscopy and upper GI back in July 2019.  EGD at that time showed a nonbleeding erosion in the greater curvature of the stomach without active bleeding.  She denies any fevers or chills.  No known sick contacts.  She is not taking any prescription medications regularly but today was prescribed amlodipine and citalopram.  She denies using any NSAIDs.  No regular alcohol use.  She is not on any blood thinners    In the ER, patient afebrile and hemodynamically stable.  CBC with hemoglobin of 5.4, platelets 467.  BMP showed normal kidney function.  LFTs within normal limits.  Occult blood was positive.  Patient given IV PPI, IV fluid and 2 units of PRBCs.    Past Medical History    I have reviewed this patient's medical history and updated it with pertinent information if needed.   Past Medical History:   Diagnosis Date     Allergic state      Depressive disorder 2003 - 2004    treated with Celexa     History of blood transfusion     prior to hyst     Kidney stones 1989, 2007,1998,2003,2007     Mumps       Nephrolithiasis 3/2/2015     PONV (postoperative nausea and vomiting)     n/v postop       Past Surgical History   I have reviewed this patient's surgical history and updated it with pertinent information if needed.  Past Surgical History:   Procedure Laterality Date     ABDOMEN SURGERY  Feb 1996    Abdominal Hysterectomy     BIOPSY  July 2013    mole on back     COLONOSCOPY  08/02/2013    Dr. Sow [facility]     COLONOSCOPY  8/2/2013     COLONOSCOPY  07/16/2019    Dr. Sow [facility]     COLONOSCOPY N/A 7/16/2019    Procedure: COLONOSCOPY;  Surgeon: Nikita Sow MD;  Location:  GI     COMBINED CYSTOSCOPY, RETROGRADES, URETEROSCOPY, LASER HOLMIUM LITHOTRIPSY URETER(S), INSERT STENT Right 3/3/2015    Procedure: COMBINED CYSTOSCOPY, RETROGRADES, URETEROSCOPY, LASER HOLMIUM LITHOTRIPSY URETER(S), INSERT STENT;  Surgeon: Carlos Samson MD;  Location:  OR     CYSTOSCOPY       ENT SURGERY  1964    T&A as a child     ESOPHAGOSCOPY, GASTROSCOPY, DUODENOSCOPY (EGD), COMBINED  07/16/2019    Dr. Sow [facility]     ESOPHAGOSCOPY, GASTROSCOPY, DUODENOSCOPY (EGD), COMBINED N/A 7/16/2019    Procedure: ESOPHAGOGASTRODUODENOSCOPY, WITH BIOPSY;  Surgeon: Nikita Sow MD;  Location:  GI     GENITOURINARY SURGERY  3/3/15 Dr Samson    Kidney Stone Removed - Cystoscopy  Right side     HEAD & NECK SURGERY  1975    wisdom teeth removed     HYSTERECTOMY, PAP NO LONGER INDICATED       LASER HOLMIUM LITHOTRIPSY URETER(S), INSERT STENT, COMBINED Left 6/20/2019    Procedure: 1. Cystourethroscopy 2. Left ureteroscopy with holmium laser lithotripsy and stone extraction 3. Left retrograde pyelography with interpretation of intraoperative fluoroscopic imaging 4. Left ureteral stent placement;  Surgeon: Willis Reid MD;  Location:  OR     ORTHOPEDIC SURGERY  May 2015    Broken ankle - left     SOFT TISSUE SURGERY  1982    left wrist ganglion cyst     SOFT TISSUE SURGERY  1981    bilateral ingrown toenail removal  surgery       Prior to Admission Medications   Prior to Admission Medications   Prescriptions Last Dose Informant Patient Reported? Taking?   Fexofenadine HCl (ALLEGRA ALLERGY PO)   Yes No   Sig: Take 180 mg by mouth At Bedtime    Nasal Dilators (BREATHE RIGHT)   Yes No   PROBIOTIC PRODUCT PO   Yes No   Sig: Take 1 capsule by mouth daily    amLODIPine (NORVASC) 5 MG tablet   No No   Sig: Take 1 tablet (5 mg) by mouth daily   citalopram (CELEXA) 10 MG tablet   No No   Sig: Take 1 tablet (10 mg) by mouth daily   econazole nitrate 1 % cream   No No   Sig: Apply topically daily   omeprazole (PRILOSEC) 20 MG DR capsule   No No   Sig: Take 1 capsule (20 mg) by mouth daily      Facility-Administered Medications: None     Allergies   Allergies   Allergen Reactions     Augmentin Rash     Codeine Sulfate Rash     Ivp Dye [Contrast Dye] Rash     Sulfa Drugs Rash     Tetracyclines Rash       Social History   I have reviewed this patient's social history and updated it with pertinent information if needed. Berna Lee  reports that she is a non-smoker but has been exposed to tobacco smoke. She has been exposed to 0.00 packs per day for the past 0.00 years. She has never used smokeless tobacco. She reports current alcohol use. She reports that she does not use drugs.    Family History   I have reviewed this patient's family history and updated it with pertinent information if needed.   Family History   Problem Relation Age of Onset     Hypertension Mother      Arthritis Mother         rheumatoid arthritis     Thyroid Disease Mother      Osteoporosis Mother      Neurologic Disorder Mother      Genitourinary Problems Mother      Cerebrovascular Disease Mother      Anxiety Disorder Mother      Obesity Mother      Unknown/Adopted Mother         Rheumatoid Arthritis/ HypoThyroid     Hypertension Father      Lipids Father      Coronary Artery Disease Father      Hyperlipidemia Father      Obesity Father      Depression Maternal  Grandmother      Thyroid Disease Maternal Grandmother      Gastrointestinal Disease Maternal Grandmother      Osteoporosis Maternal Grandmother      Diabetes Maternal Grandfather      Genitourinary Problems Maternal Grandfather      Thyroid Disease Sister      Obesity Sister      Hypertension Sister      Hyperlipidemia Sister      Thyroid Disease Sister      Obesity Sister      Unknown/Adopted Sister         Rheumatiod Arthritis/HypoThyroid     Obesity Paternal Grandmother      Unknown/Adopted Son         Sarcoidosis/Brain     Colon Cancer No family hx of        Review of Systems   The 10 point Review of Systems is negative other than noted in the HPI or here.     Physical Exam   Temp: 99.6  F (37.6  C) Temp src: Temporal BP: 126/65 Pulse: 86   Resp: 20 SpO2: 94 % O2 Device: None (Room air)    Vital Signs with Ranges  Temp:  [98.5  F (36.9  C)-99.6  F (37.6  C)] 99.6  F (37.6  C)  Pulse:  [] 86  Resp:  [20] 20  BP: (118-152)/(52-88) 126/65  SpO2:  [94 %-100 %] 94 %  0 lbs 0 oz    Constitutional: NAD, Nontoxic  HEENT: Normocephalic, MMM, no elevation of JVD noted  Respiratory: Nl WOB, Clear bilaterally, No wheezes, no crackles  Cardiovascular: Regular, no murmur  GI: Obese, BS+, mildly tender in the epigastric region.  No rebound or guarding.  Lymph/Hematologic: No bruising. No cervical LAD  Skin: No rash  Musculoskeletal: Nl Tone, mild bilateral lower extremity edema noted.  Neurologic: A&Ox3, Answers appropriately. CNII-XII intact. Moves all extremities. No tremor  Psychiatric: Calm    Data   Data reviewed today:  I personally reviewed  Recent Labs   Lab 09/02/21  1816 09/02/21  1607   WBC 12.6* 13.1*   HGB 5.4* 5.8*   MCV 70* 72*   * 520*     --    POTASSIUM 4.2  --    CHLORIDE 105  --    CO2 24  --    BUN 14  --    CR 0.73  --    ANIONGAP 7  --    JULEE 8.8  --    *  --    ALBUMIN 3.3*  --    PROTTOTAL 6.9  --    BILITOTAL 0.6  --    ALKPHOS 112  --    ALT 19  --    AST 32  --        No  results found for this or any previous visit (from the past 24 hour(s)).    Clinically Significant Risk Factors Present on Admission

## 2021-09-04 ENCOUNTER — HEALTH MAINTENANCE LETTER (OUTPATIENT)
Age: 61
End: 2021-09-04

## 2021-09-04 VITALS
OXYGEN SATURATION: 96 % | HEART RATE: 72 BPM | BODY MASS INDEX: 39.79 KG/M2 | DIASTOLIC BLOOD PRESSURE: 58 MMHG | TEMPERATURE: 97 F | SYSTOLIC BLOOD PRESSURE: 131 MMHG | RESPIRATION RATE: 16 BRPM | WEIGHT: 239.1 LBS

## 2021-09-04 LAB
ANION GAP SERPL CALCULATED.3IONS-SCNC: 4 MMOL/L (ref 3–14)
BUN SERPL-MCNC: 8 MG/DL (ref 7–30)
CALCIUM SERPL-MCNC: 8.2 MG/DL (ref 8.5–10.1)
CHLORIDE BLD-SCNC: 111 MMOL/L (ref 94–109)
CO2 SERPL-SCNC: 25 MMOL/L (ref 20–32)
CREAT SERPL-MCNC: 0.73 MG/DL (ref 0.52–1.04)
ERYTHROCYTE [DISTWIDTH] IN BLOOD BY AUTOMATED COUNT: 21.6 % (ref 10–15)
GFR SERPL CREATININE-BSD FRML MDRD: 89 ML/MIN/1.73M2
GLUCOSE BLD-MCNC: 104 MG/DL (ref 70–99)
HCT VFR BLD AUTO: 27.6 % (ref 35–47)
HGB BLD-MCNC: 7.5 G/DL (ref 11.7–15.7)
HGB BLD-MCNC: 7.5 G/DL (ref 11.7–15.7)
HGB BLD-MCNC: 8.6 G/DL (ref 11.7–15.7)
MCH RBC QN AUTO: 20.5 PG (ref 26.5–33)
MCHC RBC AUTO-ENTMCNC: 27.2 G/DL (ref 31.5–36.5)
MCV RBC AUTO: 76 FL (ref 78–100)
PLATELET # BLD AUTO: 365 10E3/UL (ref 150–450)
POTASSIUM BLD-SCNC: 3.8 MMOL/L (ref 3.4–5.3)
RBC # BLD AUTO: 3.65 10E6/UL (ref 3.8–5.2)
SODIUM SERPL-SCNC: 140 MMOL/L (ref 133–144)
WBC # BLD AUTO: 10.2 10E3/UL (ref 4–11)

## 2021-09-04 PROCEDURE — 36415 COLL VENOUS BLD VENIPUNCTURE: CPT | Performed by: HOSPITALIST

## 2021-09-04 PROCEDURE — 250N000013 HC RX MED GY IP 250 OP 250 PS 637: Performed by: HOSPITALIST

## 2021-09-04 PROCEDURE — 85027 COMPLETE CBC AUTOMATED: CPT | Performed by: HOSPITALIST

## 2021-09-04 PROCEDURE — 258N000003 HC RX IP 258 OP 636: Performed by: HOSPITALIST

## 2021-09-04 PROCEDURE — 80048 BASIC METABOLIC PNL TOTAL CA: CPT | Performed by: HOSPITALIST

## 2021-09-04 PROCEDURE — 85018 HEMOGLOBIN: CPT | Performed by: HOSPITALIST

## 2021-09-04 PROCEDURE — 99239 HOSP IP/OBS DSCHRG MGMT >30: CPT | Performed by: INTERNAL MEDICINE

## 2021-09-04 RX ORDER — OMEPRAZOLE 20 MG/1
20 TABLET, DELAYED RELEASE ORAL DAILY
Qty: 30 TABLET | Refills: 0 | Status: SHIPPED | OUTPATIENT
Start: 2021-11-04 | End: 2021-09-16

## 2021-09-04 RX ORDER — FERROUS GLUCONATE 324(38)MG
324 TABLET ORAL
Qty: 30 TABLET | Refills: 0 | Status: SHIPPED | OUTPATIENT
Start: 2021-09-04

## 2021-09-04 RX ADMIN — PANTOPRAZOLE SODIUM 40 MG: 40 TABLET, DELAYED RELEASE ORAL at 06:40

## 2021-09-04 RX ADMIN — SODIUM CHLORIDE: 9 INJECTION, SOLUTION INTRAVENOUS at 03:48

## 2021-09-04 ASSESSMENT — ACTIVITIES OF DAILY LIVING (ADL)
ADLS_ACUITY_SCORE: 13

## 2021-09-04 NOTE — PLAN OF CARE
Evening RN    Patient vital signs are at baseline: Yes  Patient able to ambulate as they were prior to admission or with assist devices provided by therapies during their stay:  Yes  Patient MUST void prior to discharge:  Yes  Patient able to tolerate oral intake:  Yes  Pain has adequate pain control using Oral analgesics:  Yes    Pt A/O x4.  VSS and afebrile.  Pt denies pain.  CMS intact.  Skin wdl.  Up SBA/independent in room.  Voiding adequately.  BM this shift, formed dark brown but not black.  Tolerating regular diet well.  Hmg was 8.2 and 8.4.  Plan is home tomorrow on discharge if hmg's remain stable.  Will continue to monitor.

## 2021-09-04 NOTE — PLAN OF CARE
Pt is A&Ox4. VSS. No nausea or vomiting. No SOB. Denying pain. Tolerating a regular diet. Resting between cares. Hgb 7.5. Rx sent home with pt. Education provided. Pt discharged to home with .

## 2021-09-04 NOTE — DISCHARGE SUMMARY
[facility]  Discharge Summary  Name: Berna Lee    MRN: [id number]  YOB: 1960    Age: 61 year old  Date of Discharge:  9/4/2021  Date of Admission: 9/2/2021  Primary Care Provider: Bing Fung  Discharge Physician:  Timothy Estes MD  Discharging Service:  Hospitalist      Discharge Diagnosis:  Worsening anemia secondary to acute blood loss secondary to upper GI bleeding found with Inderjit ulcers with known hiatal hernia  Hypertension  History of depression       Other Diagnosis:  Past Medical History:   Diagnosis Date     Allergic state      Depressive disorder 2003 - 2004    treated with Celexa     History of blood transfusion     prior to hyst     Kidney stones 1989, 2007,1998,2003,2007     Mumps      Nephrolithiasis 3/2/2015     PONV (postoperative nausea and vomiting)     n/v postop          Discharge Disposition:  Discharged to home     Allergies:  Allergies   Allergen Reactions     Augmentin Rash     Codeine Sulfate Rash     Ivp Dye [Contrast Dye] Rash     Sulfa Drugs Rash     Tetracyclines Rash        Discharge Medications:   Current Discharge Medication List      START taking these medications    Details   ferrous gluconate (FERGON) 324 (38 Fe) MG tablet Take 1 tablet (324 mg) by mouth daily (with breakfast)  Qty: 30 tablet, Refills: 0    Associated Diagnoses: UGIB (upper gastrointestinal bleed)      omeprazole (PRILOSEC OTC) 20 MG EC tablet Take 1 tablet (20 mg) by mouth daily  Qty: 30 tablet, Refills: 0    Associated Diagnoses: UGIB (upper gastrointestinal bleed)         CONTINUE these medications which have CHANGED    Details   omeprazole (PRILOSEC) 20 MG DR capsule Take 1 capsule (20 mg) by mouth 2 times daily  Qty: 120 capsule, Refills: 0    Associated Diagnoses: Iron deficiency anemia due to chronic blood loss         CONTINUE these medications which have NOT CHANGED    Details   econazole nitrate 1 % cream Apply topically daily  Qty: 85 g, Refills: 3     Associated Diagnoses: Candidal intertrigo      Fexofenadine HCl (ALLEGRA ALLERGY PO) Take 180 mg by mouth At Bedtime       Nasal Dilators (BREATHE RIGHT)       PROBIOTIC PRODUCT PO Take 1 capsule by mouth daily       amLODIPine (NORVASC) 5 MG tablet Take 1 tablet (5 mg) by mouth daily  Qty: 90 tablet, Refills: 0    Associated Diagnoses: Benign essential hypertension      citalopram (CELEXA) 10 MG tablet Take 1 tablet (10 mg) by mouth daily  Qty: 90 tablet, Refills: 1    Associated Diagnoses: Major depressive disorder, recurrent episode, moderate (H); JAROD (generalized anxiety disorder)              Condition on Discharge:  Discharge condition: Stable   Discharge vitals: Blood pressure 131/58, pulse 72, temperature 97  F (36.1  C), temperature source Temporal, resp. rate 16, weight 108.5 kg (239 lb 1.6 oz), SpO2 96 %, not currently breastfeeding.   Code status on discharge: Full Code     History of Present Illness:  See detailed admission note for full details.        Significant Physical Exam Findings Day of Discharge:  HEENT; Atraumatic, normocephalic, pinkish conjuctiva, pupils bilateral reactive   Skin: warm and moist, no rashes  Lymphatics: no cervical or axillary lymphandenopathy  Lungs: equal chest expansion, clear to auscultation, no wheezes, no stridor, no crackles,   Heart: normal rate, normal rhythm, no rubs or gallops.   Abdomen: normal bowel sounds, no tenderness, no peritoneal signs, no guarding  Extremities: no deformities, no edema   Neuro; follow commands, alert and oriented x3, spontaneous speech, coherent, moves all extremities spontaneously  Psych; no hallucination, euthymic mood, not agitated        Procedures other than Imaging:  EGD                                                                                   Findings:        The Z-line was regular and was found 34 cm from the incisors.        The examined esophagus was normal.        A large hiatal hernia with multiple Inderjit ulcers was  found, stigmata        of recent bleeding present on the ulcers and small amount of fresh red        blood found at the location of the hiatal hernia but no active bleeding        noted. The proximal extent of the gastric folds (end of tubular        esophagus) was 34 cm from the incisors. The hiatal narrowing was 40 cm        from the incisors. The Z-line was 34 cm from the incisors.        The examined duodenum was normal.                                                                                     Impression:               - Z-line regular, 34 cm from the incisors.                             - Normal esophagus.                             - Large hiatal hernia with multiple Inderjit ulcers                             and stigmata of recent bleeding, no active bleeding                             now.                             - Normal examined duodenum.                             - No specimens collected.   Recommendation:           Omeprazole 20 mg po BID for 8 weeks then decrease                             to daily. As long as she has this large hiatal                             hernia she should be on daily PPI for life. She did                             ask about surgery and that would be an outpatient                             referral by her physician to a surgeon that                             specializes in hiatal hernias. She can eat, and go                             home per hospitalist. Signing off but please call                             with questions or concerns.                                                     Imaging:  Results for orders placed or performed during the hospital encounter of 06/20/19   XR Surgery JACOB L/T 5 Min Fluoro w Stills    Narrative    This exam was marked as non-reportable because it will not be read by a   radiologist or a [place] non-radiologist provider.                    Consultations:  Consultation during this admission received from  gastroenterology.     Recent Lab Results:  Recent Labs   Lab 09/04/21  0716 09/03/21  2346 09/03/21 1811 09/02/21 1816 09/02/21  1607   WBC 10.2  --   --  12.6* 13.1*   HGB 7.5* 7.5* 8.4* 5.4* 5.8*   HCT 27.6*  --   --  21.1* 23.1*   MCV 76*  --   --  70* 72*     --   --  467* 520*     No results for input(s): CULT in the last 168 hours.  Recent Labs   Lab 09/04/21  0716 09/03/21  0758 09/02/21 1816 09/02/21  1607    141 136 137   POTASSIUM 3.8 3.8 4.2 4.0   CHLORIDE 111* 111* 105 104   CO2 25 26 24 23   ANIONGAP 4 4 7 10   * 109* 109* 109*   BUN 8 9 14 14   CR 0.73 0.78 0.73 0.76   GFRESTIMATED 89 82 89 84   JULEE 8.2* 8.8 8.8 9.1   PROTTOTAL  --   --  6.9 7.0   ALBUMIN  --   --  3.3* 3.2*   BILITOTAL  --   --  0.6 0.7   ALKPHOS  --   --  112 109   AST  --   --  32 16   ALT  --   --  19 21     Recent Labs   Lab 09/04/21  0716 09/03/21  0758 09/02/21 1816 09/02/21  1607   * 109* 109* 109*     No results for input(s): LACT in the last 168 hours.  No results for input(s): TROPONIN, TROPI, TROPR in the last 168 hours.    Invalid input(s): TROP, TROPONINIES  No results for input(s): COLOR, APPEARANCE, URINEGLC, URINEBILI, URINEKETONE, SG, UBLD, URINEPH, PROTEIN, UROBILINOGEN, NITRITE, LEUKEST, RBCU, WBCU in the last 168 hours.       Pending Results:    Unresulted Labs Ordered in the Past 30 Days of this Admission     No orders found from 8/3/2021 to 9/3/2021.           Discharge Instructions and Follow-Up:   Discharge diet: Orders Placed This Encounter      Regular Diet Adult      Diet     Discharge activity: Activity as tolerated   Discharge follow-up: 1-2 weeks with PCP  You will need to follow-up with your gastroenterologist as scheduled   Outpatient therapy: None    Other instructions: None      Hospital Course:  I assume service care today.  Seen and examined.  Chart reviewed.  Case discussed with nursing service.  Berna did well overnight with no reported bleeding tendencies.  Most  recent hemoglobin stable at 7.5 g.  Tolerated EGD earlier care of gastroenterology service.  Findings of Inderjit lesions likely source of bleeding from known hiatal hernia seen.  Recommendations for twice daily PPI at least in the next 60 days and continued lifelong on daily PPI if she continues to have increasing abdominal exam.  Added iron supplementation upon discharge.  No new modifications with her chronic medical home regimen such as for maintenance for hypertension and history of depression.  Patient is hopeful for home discharge and will proceed with that today.     Total time spent in face to face contact with the patient and coordinating discharge was:  > 30 Minutes.

## 2021-09-04 NOTE — PLAN OF CARE
Blood pressure 129/64, pulse 76, temperature 97  F (36.1  C), temperature source Temporal, resp. rate 18, weight 108.5 kg (239 lb 1.6 oz), SpO2 100 %, not currently breastfeeding.    Pt A/O x4.  VSS and afebrile.  Pt denies pain.  CMS intact.  Skin wdl.    independent in room.  Voiding adequately.Tolerating regular diet well.  Hmg 7.5 at midnight. Plan  is  to discharge if 6 am hgb remains stable. Will continue to monitor.

## 2021-09-07 ENCOUNTER — PATIENT OUTREACH (OUTPATIENT)
Dept: CARE COORDINATION | Facility: CLINIC | Age: 61
End: 2021-09-07

## 2021-09-07 DIAGNOSIS — Z71.89 OTHER SPECIFIED COUNSELING: ICD-10-CM

## 2021-09-07 NOTE — PROGRESS NOTES
"Clinic Care Coordination Contact  [facility]: Post-Discharge Note  SITUATION                                                      Admission:    Admission Date: 09/02/21   Reason for Admission: Worsening anemia secondary to acute blood loss  Discharge:   Discharge Date: 09/04/21  Discharge Diagnosis: Worsening anemia secondary to acute blood loss    BACKGROUND                                                      Berna Lee is a 61 year old female with a past medical history of depression/anxiety who presents with generalized weakness and fatigue    ASSESSMENT      Enrollment  Primary Care Care Coordination Status: Declined    Discharge Assessment  How are you doing now that you are home?: \"Doing ok\"  How are your symptoms? (Red Flag symptoms escalate to triage hotline per guidelines): Improved  Do you feel your condition is stable enough to be safe at home until your provider visit?: Yes  Does the patient have their discharge instructions? : Yes  Does the patient have questions regarding their discharge instructions? : No  Were you started on any new medications or were there changes to any of your previous medications? : Yes  Does the patient have all of their medications?: Yes  Do you have questions regarding any of your medications? : No  Do you have all of your needed medical supplies or equipment (DME)?  (i.e. oxygen tank, CPAP, cane, etc.): Yes  Discharge follow-up appointment scheduled within 14 calendar days? : Yes  Discharge Follow Up Appointment Scheduled with?: Primary Care Provider         Post-op (Clinicians Only)  Did the patient have surgery or a procedure: No  Fever: No  Chills: No  Eating & Drinking: eating and drinking without complaints/concerns  PO Intake: regular diet  Bowel Function: normal  Date of last BM: 09/06/21  Urinary Status: voiding without complaint/concerns        PLAN                                                      Outpatient Plan:  1-2 weeks with PCP  You will " need to follow-up with your gastroenterologist as scheduled    Future Appointments   Date Time Provider Department Center   9/15/2021  1:30 PM LIZETH WARNER/LPN [facility]   10/28/2021  1:50 PM Bing Fung MD [facility]         For any urgent concerns, please contact our 24 hour nurse triage line: [phone] ([phone])         Kat Webster  Community Health Worker  [facility]  [phone]

## 2021-09-14 ENCOUNTER — LAB (OUTPATIENT)
Dept: LAB | Facility: CLINIC | Age: 61
End: 2021-09-14
Payer: COMMERCIAL

## 2021-09-14 ENCOUNTER — ANCILLARY PROCEDURE (OUTPATIENT)
Dept: MAMMOGRAPHY | Facility: CLINIC | Age: 61
End: 2021-09-14
Attending: PEDIATRICS
Payer: COMMERCIAL

## 2021-09-14 ENCOUNTER — MYC MEDICAL ADVICE (OUTPATIENT)
Dept: PEDIATRICS | Facility: CLINIC | Age: 61
End: 2021-09-14

## 2021-09-14 DIAGNOSIS — D50.0 IRON DEFICIENCY ANEMIA DUE TO CHRONIC BLOOD LOSS: Primary | ICD-10-CM

## 2021-09-14 DIAGNOSIS — Z00.00 HEALTHCARE MAINTENANCE: ICD-10-CM

## 2021-09-14 DIAGNOSIS — D50.0 IRON DEFICIENCY ANEMIA DUE TO CHRONIC BLOOD LOSS: ICD-10-CM

## 2021-09-14 LAB
ERYTHROCYTE [DISTWIDTH] IN BLOOD BY AUTOMATED COUNT: 25.3 % (ref 10–15)
HCT VFR BLD AUTO: 34.9 % (ref 35–47)
HGB BLD-MCNC: 9.6 G/DL (ref 11.7–15.7)
MCH RBC QN AUTO: 22.1 PG (ref 26.5–33)
MCHC RBC AUTO-ENTMCNC: 27.5 G/DL (ref 31.5–36.5)
MCV RBC AUTO: 80 FL (ref 78–100)
PLATELET # BLD AUTO: 465 10E3/UL (ref 150–450)
RBC # BLD AUTO: 4.34 10E6/UL (ref 3.8–5.2)
WBC # BLD AUTO: 11.2 10E3/UL (ref 4–11)

## 2021-09-14 PROCEDURE — 36415 COLL VENOUS BLD VENIPUNCTURE: CPT

## 2021-09-14 PROCEDURE — 77063 BREAST TOMOSYNTHESIS BI: CPT | Mod: TC | Performed by: RADIOLOGY

## 2021-09-14 PROCEDURE — 85027 COMPLETE CBC AUTOMATED: CPT

## 2021-09-14 PROCEDURE — 77067 SCR MAMMO BI INCL CAD: CPT | Mod: TC | Performed by: RADIOLOGY

## 2021-09-14 NOTE — TELEPHONE ENCOUNTER
Routing to Dr. Fung - would you recommend checking any labs before the 9/16 hospital follow up visit?

## 2021-09-16 ENCOUNTER — OFFICE VISIT (OUTPATIENT)
Dept: PEDIATRICS | Facility: CLINIC | Age: 61
End: 2021-09-16
Payer: COMMERCIAL

## 2021-09-16 VITALS
WEIGHT: 243.4 LBS | DIASTOLIC BLOOD PRESSURE: 72 MMHG | BODY MASS INDEX: 40.5 KG/M2 | HEART RATE: 90 BPM | SYSTOLIC BLOOD PRESSURE: 130 MMHG | OXYGEN SATURATION: 97 % | TEMPERATURE: 97.4 F

## 2021-09-16 DIAGNOSIS — D50.0 IRON DEFICIENCY ANEMIA DUE TO CHRONIC BLOOD LOSS: ICD-10-CM

## 2021-09-16 DIAGNOSIS — K92.2 UGIB (UPPER GASTROINTESTINAL BLEED): ICD-10-CM

## 2021-09-16 DIAGNOSIS — Z00.00 HEALTHCARE MAINTENANCE: ICD-10-CM

## 2021-09-16 DIAGNOSIS — E55.9 VITAMIN D DEFICIENCY: ICD-10-CM

## 2021-09-16 DIAGNOSIS — Z09 HOSPITAL DISCHARGE FOLLOW-UP: Primary | ICD-10-CM

## 2021-09-16 PROCEDURE — 99213 OFFICE O/P EST LOW 20 MIN: CPT | Mod: GC | Performed by: STUDENT IN AN ORGANIZED HEALTH CARE EDUCATION/TRAINING PROGRAM

## 2021-09-16 RX ORDER — CHOLECALCIFEROL (VITAMIN D3) 50 MCG
1 TABLET ORAL DAILY
Qty: 90 TABLET | Refills: 3 | Status: SHIPPED | OUTPATIENT
Start: 2021-11-08

## 2021-09-16 RX ORDER — ERGOCALCIFEROL 1.25 MG/1
50000 CAPSULE, LIQUID FILLED ORAL WEEKLY
Qty: 8 CAPSULE | Refills: 0 | Status: SHIPPED | OUTPATIENT
Start: 2021-09-16 | End: 2021-10-28

## 2021-09-16 RX ORDER — OMEPRAZOLE 20 MG/1
20 TABLET, DELAYED RELEASE ORAL DAILY
Qty: 90 TABLET | Refills: 3 | Status: SHIPPED | OUTPATIENT
Start: 2021-11-04 | End: 2021-10-28

## 2021-09-16 NOTE — PROGRESS NOTES
Assessment & Plan     Hospital discharge follow-up  Doing well since discharge. No recurrence of symptoms.    Iron deficiency anemia due to chronic blood loss  Currently on iron supplementation and vitamin C. Wants to check Hgb with future labs prior to wellness visit. No constipation.   - Hemoglobin; Future  - handout on Iron replacement given    Vitamin D deficiency  Diagnosed on labs last time. Likely contributing to low mood.  - vitamin D2 (ERGOCALCIFEROL) 24715 units (1250 mcg) capsule; Take 1 capsule (50,000 Units) by mouth once a week for 8 doses  - vitamin D3 (CHOLECALCIFEROL) 50 mcg (2000 units) tablet; Take 1 tablet (50 mcg) by mouth daily  - information on calcium supplementation given    Healthcare maintenance  Pt wants fasting labs ordered to have in hand prior to wellness visit with Dr. Fung 10/28. Will order appropriate studies.  - Lipid panel reflex to direct LDL Fasting; Future  - **Basic metabolic panel FUTURE 14d; Future             Follow up with fasting labs prior to seeing Dr. Fung 10/28    Return for Appt already scheduled.    Herminio Ghotra MD  [facility] BRENDA Coronel is a 61 year old who presents for the following health issues     HPI       Hospital Follow-up Visit:    Hospital/Nursing Home/IP Rehab Facility: [facility]  Date of Admission: 09/02/2021  Date of Discharge: 09/04/2021  Reason(s) for Admission: Worsening anemia secondary to acute blood loss secondary to upper GI bleeding found with Inderjit ulcers with known hiatal hernia      Was your hospitalization related to COVID-19? No   Problems taking medications regularly:  None  Medication changes since discharge: Fergon and omeprazole, taking vitamin C, vitamin B12 and folic acid, vitamin D3   Problems adhering to non-medication therapy:  None    Summary of hospitalization:  [facility] discharge summary reviewed  Diagnostic Tests/Treatments reviewed.  Follow up  needed: none  Other Healthcare Providers Involved in Patient s Care:         None  Update since discharge: improved. Post Discharge Medication Reconciliation: discharge medications reconciled, continue medications without change.  Plan of care communicated with patient          Pt wants to not take celexa b/c she read about side effects on the internet.    Additionally pt not taking norvasc b/c BP dropped after transfusion. Suggested holding off given normal vitals.    Pt wants Hgb rechecked. No longer symptomatic.    She has numerous questions about calcium, b12 and folate supplementation.      Review of Systems   Constitutional, HEENT, cardiovascular, pulmonary, GI, , musculoskeletal, neuro, skin, endocrine and psych systems are negative, except as otherwise noted.      Objective    /72 (BP Location: Right arm, Patient Position: Sitting, Cuff Size: Adult Large)   Pulse 90   Temp 97.4  F (36.3  C) (Tympanic)   Wt 110.4 kg (243 lb 6.4 oz)   SpO2 97%   BMI 40.50 kg/m    Body mass index is 40.5 kg/m .  Physical Exam   GENERAL: morbidly obese, alert and no distress  EYES: Eyes grossly normal to inspection, PERRL and conjunctivae and sclerae normal  HENT: ear canals normal, nose and mouth without ulcers or lesions  NECK: no adenopathy, no asymmetry, masses, or scars and thyroid normal to palpation  RESP: lungs clear to auscultation - no rales, rhonchi or wheezes  BREAST: normal without masses, tenderness or nipple discharge and no palpable axillary masses or adenopathy  CV: regular rate and rhythm, normal S1 S2, no S3 or S4, 1/6 SM noted, no click or rub, no peripheral edema and peripheral pulses strong  ABDOMEN: soft, obese nontender, no hepatosplenomegaly, no masses and bowel sounds normal  MS: no gross musculoskeletal defects noted, no edema  SKIN: no suspicious lesions or rashes  NEURO: Normal strength and tone, mentation intact and speech normal  PSYCH: mentation appears normal, affect flat.

## 2021-09-16 NOTE — PATIENT INSTRUCTIONS
Thanks for coming in today.    It's ok to hold off on starting amlodipine and celexa at this time.    Please see the below information on calcium and iron supplementation.    Please take the vitamin D we have prescribed.    I ordered fasting labs to be drawn in October. You can get these drawn prior to seeing Dr. Fung.      Patient Education     Preventing Osteoporosis: Meeting Your Calcium Needs    Your body needs calcium to build and repair bones. But it can't make calcium on its own. That's why it's important to eat calcium-rich foods. Some foods are naturally rich in calcium. Others have calcium added (fortified). It's best to get calcium from the foods you eat. But if you can't get enough, you may want to take calcium supplements. To meet your daily calcium needs, try the foods listed below.  Dairy Fish & beans Other sources   Source   Calcium (mg) per serving   Source   Calcium (mg) per serving   Source   Calcium (mg) per serving   Low-fat yogurt, plain   415 mg/8 oz.   Sardines, [place], canned, with bones   351 mg/3 oz.   Oatmeal, instant, fortified   215 mg/1 cup   Nonfat milk   302 mg/1 cup   [place], sockeye, canned, with bones   239 mg/3 oz.   Tofu made with calcium sulfate   204 mg/3 oz.   Low-fat milk   297 mg/1 cup   Soybeans, fresh, boiled   131 mg/1/2 cup   Collards   179 mg/1/2 cup   Swiss cheese   272 mg/1 oz.   White beans, cooked   81 mg/1/2 cup   English muffin, whole wheat   175 mg/1 muffin   Cheddar cheese   205 mg/1 oz.   Navy beans, cooked   79 mg/1/2 cup   Kale   90 mg/1/2 cup   Ice cream strawberry   79 mg/1/2 cup           Orange, navel   56 mg/1 medium   Note: Calcium levels may vary depending on brand and size.  Daily calcium needs  14 to 18 years old: 1,300 mg  19 to 30 years old: 1,000 mg  31 to 50 years old: 1,000 mg  51 to 70 years old, women: 1,200 mg  51 to 70 years old, men: 1,000 mg  Pregnant or nursin to 18 years old: 1,300 mg, 19 to 50 years old: 1,000 mg  Older than 70  (women and men): 1,200 mg   [employer] last reviewed this educational content on 5/1/2018 2000-2021 [employer]. All rights reserved. This information is not intended as a substitute for professional medical care. Always follow your healthcare professional's instructions.           Patient Education     Iron Supplements  Most people think of iron as a metal used to make pots, frying pans, and soup kettles. This same metal (or mineral) also plays a vital role in the body. Iron helps the blood cells carry oxygen. When you don t get enough iron, you may feel tired and lack energy. Over time, without enough iron, your body makes fewer red blood cells. This can cause a condition called iron-deficiency anemia. Since your body doesn t make iron, you must get it from foods or supplements.  Food sources of iron  Iron is found in a few types of foods. Good sources include:    Red meat, poultry, fish, eggs    Dried fruits (especially raisins, prunes, figs)    Legumes such as dried beans and lentils    Breads and cereals with iron added    Blackstrap molasses    Spinach    Foods cooked in cast-iron pans. This is especially true of acidic foods, such as tomatoes and dany.  Why use a supplement?  Women often need additional iron because they lose blood during their menstrual period. But even grown men and boys may need a supplement at some point. You may need an iron supplement if any of the following is true for you:    I rarely eat foods that are high in iron (such as red meat, poultry, dried beans, and foods with iron added).    I am a vegetarian and I rarely eat legumes (dried beans, peas, lentils).    I have heavy menstrual periods.    I am pregnant or breastfeeding.    I have been diagnosed with iron-deficiency anemia.  If you take iron  Here are some tips to help you get the most from an iron supplement:    Take it with vitamin C for better absorption.    Don t take an iron supplement with milk. The  calcium in milk limits iron absorption.    Iron supplements can cause constipation. To prevent this, drink plenty of water, eat high-fiber foods, and exercise often. Also try an iron supplement with an added stool softener.    Eat a healthy diet to get all the nutrients your body needs.  Caution  The amount of iron each person needs is different, and varies by age. Women who are pregnant or breastfeeding need extra iron. But taking more than the suggested amount is not always healthy. Your healthcare provider can help you choose the right amount of iron for you.  [employer] last reviewed this educational content on 5/1/2020 2000-2021 [employer]. All rights reserved. This information is not intended as a substitute for professional medical care. Always follow your healthcare professional's instructions.           Patient Education     Preventing Osteoporosis: Meeting Your Calcium Needs    Your body needs calcium to build and repair bones. But it can't make calcium on its own. That's why it's important to eat calcium-rich foods. Some foods are naturally rich in calcium. Others have calcium added (fortified). It's best to get calcium from the foods you eat. But if you can't get enough, you may want to take calcium supplements. To meet your daily calcium needs, try the foods listed below.  Dairy Fish & beans Other sources   Source   Calcium (mg) per serving   Source   Calcium (mg) per serving   Source   Calcium (mg) per serving   Low-fat yogurt, plain   415 mg/8 oz.   Sardines, [place], canned, with bones   351 mg/3 oz.   Oatmeal, instant, fortified   215 mg/1 cup   Nonfat milk   302 mg/1 cup   [place], sockeye, canned, with bones   239 mg/3 oz.   Tofu made with calcium sulfate   204 mg/3 oz.   Low-fat milk   297 mg/1 cup   Soybeans, fresh, boiled   131 mg/1/2 cup   Collards   179 mg/1/2 cup   Swiss cheese   272 mg/1 oz.   White beans, cooked   81 mg/1/2 cup   English muffin, whole wheat   175 mg/1  muffin   Cheddar cheese   205 mg/1 oz.   Navy beans, cooked   79 mg/1/2 cup   Kale   90 mg/1/2 cup   Ice cream strawberry   79 mg/1/2 cup           Orange, navel   56 mg/1 medium   Note: Calcium levels may vary depending on brand and size.  Daily calcium needs  14 to 18 years old: 1,300 mg  19 to 30 years old: 1,000 mg  31 to 50 years old: 1,000 mg  51 to 70 years old, women: 1,200 mg  51 to 70 years old, men: 1,000 mg  Pregnant or nursin to 18 years old: 1,300 mg, 19 to 50 years old: 1,000 mg  Older than 70 (women and men): 1,200 mg   [employer] last reviewed this educational content on 2018-2021 [employer]. All rights reserved. This information is not intended as a substitute for professional medical care. Always follow your healthcare professional's instructions.           Patient Education     Iron Supplements  Most people think of iron as a metal used to make pots, frying pans, and soup kettles. This same metal (or mineral) also plays a vital role in the body. Iron helps the blood cells carry oxygen. When you don t get enough iron, you may feel tired and lack energy. Over time, without enough iron, your body makes fewer red blood cells. This can cause a condition called iron-deficiency anemia. Since your body doesn t make iron, you must get it from foods or supplements.  Food sources of iron  Iron is found in a few types of foods. Good sources include:    Red meat, poultry, fish, eggs    Dried fruits (especially raisins, prunes, figs)    Legumes such as dried beans and lentils    Breads and cereals with iron added    Blackstrap molasses    Spinach    Foods cooked in cast-iron pans. This is especially true of acidic foods, such as tomatoes and dany.  Why use a supplement?  Women often need additional iron because they lose blood during their menstrual period. But even grown men and boys may need a supplement at some point. You may need an iron supplement if any of the following is  true for you:    I rarely eat foods that are high in iron (such as red meat, poultry, dried beans, and foods with iron added).    I am a vegetarian and I rarely eat legumes (dried beans, peas, lentils).    I have heavy menstrual periods.    I am pregnant or breastfeeding.    I have been diagnosed with iron-deficiency anemia.  If you take iron  Here are some tips to help you get the most from an iron supplement:    Take it with vitamin C for better absorption.    Don t take an iron supplement with milk. The calcium in milk limits iron absorption.    Iron supplements can cause constipation. To prevent this, drink plenty of water, eat high-fiber foods, and exercise often. Also try an iron supplement with an added stool softener.    Eat a healthy diet to get all the nutrients your body needs.  Caution  The amount of iron each person needs is different, and varies by age. Women who are pregnant or breastfeeding need extra iron. But taking more than the suggested amount is not always healthy. Your healthcare provider can help you choose the right amount of iron for you.  [employer] last reviewed this educational content on 5/1/2020 2000-2021 [employer]. All rights reserved. This information is not intended as a substitute for professional medical care. Always follow your healthcare professional's instructions.

## 2021-10-26 ENCOUNTER — LAB (OUTPATIENT)
Dept: LAB | Facility: CLINIC | Age: 61
End: 2021-10-26
Payer: COMMERCIAL

## 2021-10-26 DIAGNOSIS — Z00.00 HEALTHCARE MAINTENANCE: ICD-10-CM

## 2021-10-26 DIAGNOSIS — D50.0 IRON DEFICIENCY ANEMIA DUE TO CHRONIC BLOOD LOSS: ICD-10-CM

## 2021-10-26 LAB — HGB BLD-MCNC: 12.6 G/DL (ref 11.7–15.7)

## 2021-10-26 PROCEDURE — 80061 LIPID PANEL: CPT

## 2021-10-26 PROCEDURE — 85018 HEMOGLOBIN: CPT

## 2021-10-26 PROCEDURE — 80048 BASIC METABOLIC PNL TOTAL CA: CPT

## 2021-10-26 PROCEDURE — 36415 COLL VENOUS BLD VENIPUNCTURE: CPT

## 2021-10-27 LAB
ANION GAP SERPL CALCULATED.3IONS-SCNC: 2 MMOL/L (ref 3–14)
BUN SERPL-MCNC: 10 MG/DL (ref 7–30)
CALCIUM SERPL-MCNC: 9.2 MG/DL (ref 8.5–10.1)
CHLORIDE BLD-SCNC: 106 MMOL/L (ref 94–109)
CHOLEST SERPL-MCNC: 208 MG/DL
CO2 SERPL-SCNC: 30 MMOL/L (ref 20–32)
CREAT SERPL-MCNC: 0.74 MG/DL (ref 0.52–1.04)
FASTING STATUS PATIENT QL REPORTED: YES
GFR SERPL CREATININE-BSD FRML MDRD: 87 ML/MIN/1.73M2
GLUCOSE BLD-MCNC: 104 MG/DL (ref 70–99)
HDLC SERPL-MCNC: 69 MG/DL
LDLC SERPL CALC-MCNC: 112 MG/DL
NONHDLC SERPL-MCNC: 139 MG/DL
POTASSIUM BLD-SCNC: 4 MMOL/L (ref 3.4–5.3)
SODIUM SERPL-SCNC: 138 MMOL/L (ref 133–144)
TRIGL SERPL-MCNC: 133 MG/DL

## 2021-10-28 ENCOUNTER — OFFICE VISIT (OUTPATIENT)
Dept: PEDIATRICS | Facility: CLINIC | Age: 61
End: 2021-10-28
Payer: COMMERCIAL

## 2021-10-28 VITALS
RESPIRATION RATE: 16 BRPM | OXYGEN SATURATION: 96 % | TEMPERATURE: 99 F | SYSTOLIC BLOOD PRESSURE: 136 MMHG | HEART RATE: 89 BPM | BODY MASS INDEX: 40.32 KG/M2 | DIASTOLIC BLOOD PRESSURE: 76 MMHG | WEIGHT: 242.3 LBS

## 2021-10-28 DIAGNOSIS — R10.31 RLQ ABDOMINAL PAIN: ICD-10-CM

## 2021-10-28 DIAGNOSIS — Z87.19 HISTORY OF GI BLEED: ICD-10-CM

## 2021-10-28 DIAGNOSIS — E55.9 VITAMIN D DEFICIENCY: ICD-10-CM

## 2021-10-28 DIAGNOSIS — F33.1 MODERATE EPISODE OF RECURRENT MAJOR DEPRESSIVE DISORDER (H): ICD-10-CM

## 2021-10-28 DIAGNOSIS — Z91.89 AT HIGH RISK FOR OSTEOPOROSIS: ICD-10-CM

## 2021-10-28 DIAGNOSIS — M79.89 SWELLING OF LOWER EXTREMITY: ICD-10-CM

## 2021-10-28 DIAGNOSIS — R03.0 ELEVATED BP WITHOUT DIAGNOSIS OF HYPERTENSION: ICD-10-CM

## 2021-10-28 DIAGNOSIS — M13.0 POLYARTHRITIS: ICD-10-CM

## 2021-10-28 DIAGNOSIS — Z00.00 ROUTINE GENERAL MEDICAL EXAMINATION AT A HEALTH CARE FACILITY: Primary | ICD-10-CM

## 2021-10-28 DIAGNOSIS — K44.9 HIATAL HERNIA: ICD-10-CM

## 2021-10-28 PROBLEM — F33.9 MAJOR DEPRESSION, RECURRENT (H): Status: ACTIVE | Noted: 2021-10-28

## 2021-10-28 PROCEDURE — 90471 IMMUNIZATION ADMIN: CPT | Performed by: PEDIATRICS

## 2021-10-28 PROCEDURE — 99214 OFFICE O/P EST MOD 30 MIN: CPT | Mod: 25 | Performed by: PEDIATRICS

## 2021-10-28 PROCEDURE — 90682 RIV4 VACC RECOMBINANT DNA IM: CPT | Performed by: PEDIATRICS

## 2021-10-28 PROCEDURE — 99396 PREV VISIT EST AGE 40-64: CPT | Mod: 25 | Performed by: PEDIATRICS

## 2021-10-28 NOTE — PROGRESS NOTES
SUBJECTIVE:   CC: Berna Lee is an 61 year old woman who presents for preventive health visit.       Patient has been advised of split billing requirements and indicates understanding: Yes  Healthy Habits:     Getting at least 3 servings of Calcium per day:  NO    Bi-annual eye exam:  Yes    Dental care twice a year:  Yes    Sleep apnea or symptoms of sleep apnea:  None    Diet:  Regular (no restrictions)    Frequency of exercise:  None    Duration of exercise:  N/A    Taking medications regularly:  Yes    Barriers to taking medications:  None    Medication side effects:  None    PHQ-2 Total Score: 2    Additional concerns today:  Yes (6wk f/u, discuss meds, discuss hosptial stay in Sept and edema in feet is bad.)  History of Present Illness       She eats 0-1 servings of fruits and vegetables daily.She consumes 1 sweetened beverage(s) daily.She exercises with enough effort to increase her heart rate 10 to 19 minutes per day.  She exercises with enough effort to increase her heart rate 3 or less days per week.   She is not taking prescribed medications regularly due to None.          Today's PHQ-2 Score:   PHQ-2 ( 1999 Pfizer) 10/26/2021   Q1: Little interest or pleasure in doing things 1   Q2: Feeling down, depressed or hopeless 1   PHQ-2 Score 2   Q1: Little interest or pleasure in doing things Several days   Q2: Feeling down, depressed or hopeless Several days   PHQ-2 Score 2       Abuse: Current or Past (Physical, Sexual or Emotional) - No  Do you feel safe in your environment? Yes    Have you ever done Advance Care Planning? (For example, a Health Directive, POLST, or a discussion with a medical provider or your loved ones about your wishes): No, advance care planning information given to patient to review.  Advanced care planning was discussed at today's visit.    Social History     Tobacco Use     Smoking status: Passive Smoke Exposure - Never Smoker     Smokeless tobacco: Never Used     Tobacco  comment: Live with a smoker   Substance Use Topics     Alcohol use: Yes     Alcohol/week: 0.0 standard drinks     Comment: occasional , social       Reviewed orders with patient.  Reviewed health maintenance and updated orders accordingly - Yes  Labs reviewed in EPIC    Breast Cancer Screening:  Any new diagnosis of family breast, ovarian, or bowel cancer? No    FHS-7:   Breast CA Risk Assessment (FHS-7) 9/14/2021   Did any of your first-degree relatives have breast or ovarian cancer? No   Did any of your relatives have bilateral breast cancer? No   Did any man in your family have breast cancer? No   Did any woman in your family have breast and ovarian cancer? No   Did any woman in your family have breast cancer before age 50 y? No   Do you have 2 or more relatives with breast and/or ovarian cancer? No   Do you have 2 or more relatives with breast and/or bowel cancer? No       Mammogram Screening: Recommended mammography every 1-2 years with patient discussion and risk factor consideration  Pertinent mammograms are reviewed under the imaging tab.    History of abnormal Pap smear: Status post benign hysterectomy. Health Maintenance and Surgical History updated.     Reviewed and updated as needed this visit by clinical staff  Tobacco  Allergies    Med Hx  Surg Hx  Fam Hx  Soc Hx        Reviewed and updated as needed this visit by Provider                    Hospital stay for GI Bleed in [facility] - hemoglobin has now normalized.  Vitron C daily and tolerating well.  Still noticing some dark stools intermittently - has large hiatal hernia and shira ulcers    Vitamin D - taking 5000iu D3    Added folic acid, probiotic to daily regimen    Very difficult last couple of years COVID related isolation, taking care of parents who live at the [facility] - unable to see them for extended periods of time, then couldn't see other people to make sure she was safe to see her parents    Depression - not currently  taking celexa, mood feeling better now that anemia is improved - energy level much better.   Watching her mood closely.  Also has orders in place for counseling if she feels that she needs to start    Edema in feet - her sister has similar issues -- started last [place] - uses [employer] stock[employer] most days with some improvement.   Hasn't had evaluation of heart function, but has been concerned about it.  Increased shortness of breath and decreased exercise tolerance, but also had severe anemia (Hgb to 5)    Sleep apnea?  Father has it and this is a possibility for her.  Will continue to consider.    Left leg more swollen than right and has been for some time.    RLQ pain over time - deep in pelvis, worsening recently.   Had discussed work up historically, then sidelined by pandemic.    Elevated BP - had planned to start bp medication at visit before hospitalization - never started and bp has been lower - continuing to monitor          Review of Systems   ROS: 10 point ROS neg other than the symptoms noted above in the HPI.       OBJECTIVE:   /76   Pulse 89   Temp 99  F (37.2  C) (Oral)   Resp 16   Wt 109.9 kg (242 lb 4.8 oz)   SpO2 96%   BMI 40.32 kg/m     Wt Readings from Last 4 Encounters:   10/28/21 109.9 kg (242 lb 4.8 oz)   09/16/21 110.4 kg (243 lb 6.4 oz)   09/03/21 108.5 kg (239 lb 1.6 oz)   09/02/21 108.9 kg (240 lb)       Physical Exam  GENERAL: healthy, alert and no distress  EYES: Eyes grossly normal to inspection, PERRL and conjunctivae and sclerae normal  HENT: ear canals and TM's normal, nose and mouth without ulcers or lesions  NECK: no adenopathy, no asymmetry, masses, or scars and thyroid normal to palpation  RESP: lungs clear to auscultation - no rales, rhonchi or wheezes  CV: regular rates and rhythm, normal S1 S2, no S3 or S4, no murmur, click or rub, peripheral pulses strong and 1+ bilateral lower extremity pitting edema to mid shin - left side more pronounced than right  ABDOMEN:  soft, mild tenderness RLQ, no hepatosplenomegaly, no masses and bowel sounds normal  MS: extremities normal- no gross deformities noted  SKIN: no suspicious lesions or rashes  NEURO: Normal strength and tone, mentation intact and speech normal  PSYCH: mentation appears normal, affect normal/bright    Diagnostic Test Results:  Labs reviewed in Epic    ASSESSMENT/PLAN:       ICD-10-CM    1. Routine general medical examination at a health care facility  Z00.00 INFLUENZA QUAD, RECOMBINANT, P-FREE (RIV4) (FLUBLOK)   2. History of GI bleed  Z87.19 **CBC with platelets differential FUTURE 2mo     Adult General Surg Referral    Inderjit ulcers in hiatal hernia - still noticing some bleeding.   Hemoglobin recovered - continue iron supplementation, plan surgery consultation regarding hernia repair     3. Moderate episode of recurrent major depressive disorder (H)  F33.1 Not currently on medication, but has citalopram at home to start if needed, referral also in place for counseling   4. Swelling of lower extremity  M79.89 US Lower Extremity Venous Duplex Bilateral     Echocardiogram Complete\    Plan evaluation as above, no recent surgery or travel, not on hormone related medications    If evaluation above not helpful, discussed sleep medicine referral to investigate potential RUBEN     5. At high risk for osteoporosis  Z91.89 DX Hip/Pelvis/Spine  Mother has osteoporosis     6. RLQ abdominal pain  R10.31 US Pelvic Complete with Transvaginal   7. Polyarthritis  M13.0 ESR: Erythrocyte sedimentation rate     Rheumatoid factor     Cyclic Citrullinated Peptide Antibody IgG     CRP, inflammation    Strong family hx of RA     8. Vitamin D deficiency  E55.9 Vitamin D Deficiency   9. Elevated BP without diagnosis of hypertension  R03.0 Monitoring for now and not on medication - patient to follow with cuff at home and alert me if elevated prior to our next visit in 2 months     10. Hiatal hernia  K44.9 Adult General Surg Referral  "          COUNSELING:  Special attention given to:        Regular exercise       Healthy diet/nutrition       Vision screening       Osteoporosis prevention/bone health       Colon cancer screening    Estimated body mass index is 40.32 kg/m  as calculated from the following:    Height as of 10/10/19: 1.651 m (5' 5\").    Weight as of this encounter: 109.9 kg (242 lb 4.8 oz).    Weight management plan: Discussed healthy diet and exercise guidelines    She reports that she is a non-smoker but has been exposed to tobacco smoke. She has been exposed to 0.00 packs per day for the past 0.00 years. She has never used smokeless tobacco.      Counseling Resources:  ATP IV Guidelines  Pooled Cohorts Equation Calculator  Breast Cancer Risk Calculator  BRCA-Related Cancer Risk Assessment: FHS-7 Tool  FRAX Risk Assessment  ICSI Preventive Guidelines  Dietary Guidelines for Americans, 2010  USDA's MyPlate  ASA Prophylaxis  Lung CA Screening    Bing Fung MD  [facility]  "

## 2021-10-28 NOTE — PATIENT INSTRUCTIONS
Let's repeat your labs in 2 months    Keep eye on your blood pressure - check at home a couple times a week and send these to me after a few weeks    Keep an eye on your mood - start the citalopram if you feel like you need    We'll help schedule your leg US, pelvic US, and heart echo    Come back to see me in about 2 months with labs before          Preventive Health Recommendations  Female Ages 50 - 64    Yearly exam: See your health care provider every year in order to  o Review health changes.   o Discuss preventive care.    o Review your medicines if your doctor has prescribed any.      Get a Pap test every three years (unless you have an abnormal result and your provider advises testing more often).    If you get Pap tests with HPV test, you only need to test every 5 years, unless you have an abnormal result.     You do not need a Pap test if your uterus was removed (hysterectomy) and you have not had cancer.    You should be tested each year for STDs (sexually transmitted diseases) if you're at risk.     Have a mammogram every 1 to 2 years.    Have a colonoscopy at age 50, or have a yearly FIT test (stool test). These exams screen for colon cancer.      Have a cholesterol test every 5 years, or more often if advised.    Have a diabetes test (fasting glucose) every three years. If you are at risk for diabetes, you should have this test more often.     If you are at risk for osteoporosis (brittle bone disease), think about having a bone density scan (DEXA).    Shots: Get a flu shot each year. Get a tetanus shot every 10 years.    Nutrition:     Eat at least 5 servings of fruits and vegetables each day.    Eat whole-grain bread, whole-wheat pasta and brown rice instead of white grains and rice.    Get adequate Calcium and Vitamin D.     Lifestyle    Exercise at least 150 minutes a week (30 minutes a day, 5 days a week). This will help you control your weight and prevent disease.    Limit alcohol to one drink  per day.    No smoking.     Wear sunscreen to prevent skin cancer.     See your dentist every six months for an exam and cleaning.    See your eye doctor every 1 to 2 years.

## 2021-11-03 ENCOUNTER — HOSPITAL ENCOUNTER (OUTPATIENT)
Dept: ULTRASOUND IMAGING | Facility: CLINIC | Age: 61
End: 2021-11-03
Attending: PEDIATRICS
Payer: COMMERCIAL

## 2021-11-03 DIAGNOSIS — R10.31 RLQ ABDOMINAL PAIN: ICD-10-CM

## 2021-11-03 DIAGNOSIS — M79.89 SWELLING OF LOWER EXTREMITY: ICD-10-CM

## 2021-11-03 PROCEDURE — 76830 TRANSVAGINAL US NON-OB: CPT

## 2021-11-03 PROCEDURE — 93970 EXTREMITY STUDY: CPT

## 2021-11-15 ENCOUNTER — HOSPITAL ENCOUNTER (OUTPATIENT)
Dept: CARDIOLOGY | Facility: CLINIC | Age: 61
Discharge: HOME | End: 2021-11-15
Attending: PEDIATRICS | Admitting: PEDIATRICS
Payer: COMMERCIAL

## 2021-11-15 DIAGNOSIS — M79.89 SWELLING OF LOWER EXTREMITY: ICD-10-CM

## 2021-11-15 LAB — LVEF ECHO: NORMAL

## 2021-11-15 PROCEDURE — 93306 TTE W/DOPPLER COMPLETE: CPT

## 2021-11-15 PROCEDURE — 93306 TTE W/DOPPLER COMPLETE: CPT | Mod: 26 | Performed by: INTERNAL MEDICINE

## 2021-11-29 ENCOUNTER — ANCILLARY PROCEDURE (OUTPATIENT)
Dept: BONE DENSITY | Facility: CLINIC | Age: 61
End: 2021-11-29
Attending: PEDIATRICS
Payer: COMMERCIAL

## 2021-11-29 DIAGNOSIS — Z91.89 AT HIGH RISK FOR OSTEOPOROSIS: ICD-10-CM

## 2021-11-29 PROCEDURE — 77080 DXA BONE DENSITY AXIAL: CPT | Performed by: INTERNAL MEDICINE

## 2021-12-23 ENCOUNTER — LAB (OUTPATIENT)
Dept: LAB | Facility: CLINIC | Age: 61
End: 2021-12-23
Payer: COMMERCIAL

## 2021-12-23 DIAGNOSIS — E55.9 VITAMIN D DEFICIENCY: ICD-10-CM

## 2021-12-23 DIAGNOSIS — Z87.19 HISTORY OF GI BLEED: ICD-10-CM

## 2021-12-23 DIAGNOSIS — M13.0 POLYARTHRITIS: ICD-10-CM

## 2021-12-23 LAB
BASOPHILS # BLD AUTO: 0.1 10E3/UL (ref 0–0.2)
BASOPHILS NFR BLD AUTO: 1 %
CRP SERPL-MCNC: 19 MG/L (ref 0–8)
EOSINOPHIL # BLD AUTO: 0.4 10E3/UL (ref 0–0.7)
EOSINOPHIL NFR BLD AUTO: 4 %
ERYTHROCYTE [DISTWIDTH] IN BLOOD BY AUTOMATED COUNT: 14.2 % (ref 10–15)
ERYTHROCYTE [SEDIMENTATION RATE] IN BLOOD BY WESTERGREN METHOD: 15 MM/HR (ref 0–30)
HCT VFR BLD AUTO: 43.4 % (ref 35–47)
HGB BLD-MCNC: 13.9 G/DL (ref 11.7–15.7)
LYMPHOCYTES # BLD AUTO: 2.3 10E3/UL (ref 0.8–5.3)
LYMPHOCYTES NFR BLD AUTO: 25 %
MCH RBC QN AUTO: 28.4 PG (ref 26.5–33)
MCHC RBC AUTO-ENTMCNC: 32 G/DL (ref 31.5–36.5)
MCV RBC AUTO: 89 FL (ref 78–100)
MONOCYTES # BLD AUTO: 1 10E3/UL (ref 0–1.3)
MONOCYTES NFR BLD AUTO: 11 %
NEUTROPHILS # BLD AUTO: 5.5 10E3/UL (ref 1.6–8.3)
NEUTROPHILS NFR BLD AUTO: 59 %
PLATELET # BLD AUTO: 318 10E3/UL (ref 150–450)
RBC # BLD AUTO: 4.89 10E6/UL (ref 3.8–5.2)
WBC # BLD AUTO: 9.3 10E3/UL (ref 4–11)

## 2021-12-23 PROCEDURE — 85025 COMPLETE CBC W/AUTO DIFF WBC: CPT

## 2021-12-23 PROCEDURE — 82306 VITAMIN D 25 HYDROXY: CPT

## 2021-12-23 PROCEDURE — 86200 CCP ANTIBODY: CPT

## 2021-12-23 PROCEDURE — 86431 RHEUMATOID FACTOR QUANT: CPT

## 2021-12-23 PROCEDURE — 36415 COLL VENOUS BLD VENIPUNCTURE: CPT

## 2021-12-23 PROCEDURE — 86140 C-REACTIVE PROTEIN: CPT

## 2021-12-23 PROCEDURE — 85652 RBC SED RATE AUTOMATED: CPT

## 2021-12-24 LAB
CCP AB SER IA-ACNC: 2.5 U/ML
DEPRECATED CALCIDIOL+CALCIFEROL SERPL-MC: 47 UG/L (ref 20–75)
RHEUMATOID FACT SER NEPH-ACNC: 23 IU/ML

## 2021-12-27 ENCOUNTER — OFFICE VISIT (OUTPATIENT)
Dept: PEDIATRICS | Facility: CLINIC | Age: 61
End: 2021-12-27
Payer: COMMERCIAL

## 2021-12-27 VITALS
SYSTOLIC BLOOD PRESSURE: 134 MMHG | BODY MASS INDEX: 40.67 KG/M2 | TEMPERATURE: 97.7 F | RESPIRATION RATE: 16 BRPM | WEIGHT: 244.4 LBS | DIASTOLIC BLOOD PRESSURE: 74 MMHG | OXYGEN SATURATION: 97 % | HEART RATE: 80 BPM

## 2021-12-27 DIAGNOSIS — G47.30 SLEEP APNEA, UNSPECIFIED TYPE: ICD-10-CM

## 2021-12-27 DIAGNOSIS — M05.9 RHEUMATOID ARTHRITIS WITH POSITIVE RHEUMATOID FACTOR, INVOLVING UNSPECIFIED SITE (H): Primary | ICD-10-CM

## 2021-12-27 DIAGNOSIS — F33.1 MODERATE EPISODE OF RECURRENT MAJOR DEPRESSIVE DISORDER (H): ICD-10-CM

## 2021-12-27 DIAGNOSIS — D50.0 IRON DEFICIENCY ANEMIA DUE TO CHRONIC BLOOD LOSS: ICD-10-CM

## 2021-12-27 DIAGNOSIS — F33.1 MAJOR DEPRESSIVE DISORDER, RECURRENT EPISODE, MODERATE (H): ICD-10-CM

## 2021-12-27 DIAGNOSIS — Z23 HIGH PRIORITY FOR 2019-NCOV VACCINE: ICD-10-CM

## 2021-12-27 DIAGNOSIS — R60.0 LOWER EXTREMITY EDEMA: ICD-10-CM

## 2021-12-27 DIAGNOSIS — F41.1 GAD (GENERALIZED ANXIETY DISORDER): ICD-10-CM

## 2021-12-27 DIAGNOSIS — Z87.19 HISTORY OF GI BLEED: ICD-10-CM

## 2021-12-27 PROBLEM — M06.9 RHEUMATOID ARTHRITIS (H): Status: ACTIVE | Noted: 2021-12-27

## 2021-12-27 PROCEDURE — 99215 OFFICE O/P EST HI 40 MIN: CPT | Performed by: PEDIATRICS

## 2021-12-27 PROCEDURE — 91301 COVID-19,PF,MODERNA (18+ YRS PRIMARY SERIES .5ML): CPT | Performed by: PEDIATRICS

## 2021-12-27 PROCEDURE — 0013A COVID-19,PF,MODERNA (18+ YRS PRIMARY SERIES .5ML): CPT | Performed by: PEDIATRICS

## 2021-12-27 RX ORDER — FUROSEMIDE 20 MG
20 TABLET ORAL DAILY
Qty: 7 TABLET | Refills: 0 | Status: SHIPPED | OUTPATIENT
Start: 2021-12-27 | End: 2022-02-22

## 2021-12-27 RX ORDER — CITALOPRAM HYDROBROMIDE 10 MG/1
10 TABLET ORAL DAILY
Qty: 90 TABLET | Refills: 1 | COMMUNITY
Start: 2021-12-27 | End: 2022-11-01

## 2021-12-27 NOTE — PROGRESS NOTES
Assessment & Plan       ICD-10-CM    1. Rheumatoid arthritis with positive rheumatoid factor, involving unspecified site (H)  M05.9 Rheumatology Referral    Positive rheumatoid factor, strong family history, elevated CRP.  Plan rheumatology evaluation to start DMARD   2. History of GI bleed  Z87.19  Inderjit ulcers and hiatal hernia.  Hemoglobin now stabilized the normal range.  Discussed pushing off surgical correction of large hiatal hernia until more hospital beds available secondary to challenges during the Covid pandemic   3. Moderate episode of recurrent major depressive disorder (H)  F33.1  citalopram was prescribed prior to hospital stay for acute blood loss anemia.  Patient has at home and now we will plan to start and will keep me updated with how this is going.   4. Iron deficiency anemia due to chronic blood loss  D50.0  continue iron supplementation   5. High priority for 2019-nCoV vaccine  Z23 COVID-19,PF,MODERNA (18+ Yrs Primary Series .5mL)   6. Major depressive disorder, recurrent episode, moderate (H)  F33.1 citalopram (CELEXA) 10 MG tablet  See plan as outlined above   7. JAROD (generalized anxiety disorder)  F41.1 citalopram (CELEXA) 10 MG tablet  See plan as outlined above   8. Lower extremity edema  R60.0 furosemide (LASIX) 20 MG tablet  Plan short trial of Lasix patient will update me with how this goes after about 7 days.  Continue salt avoidance and compression stockings.  Discussed that this may improve after sleep apnea treated if present.   9. Sleep apnea, unspecified type  G47.30 SLEEP EVALUATION & MANAGEMENT REFERRAL - ADULT -  Concern for obstructive sleep apnea, plan sleep study           I spent a total of 50 minutes on the day of the visit.   Time spent doing chart review, history and exam, documentation and further activities per the note       See Patient Instructions    Return in about 6 weeks (around 2/7/2022) for Follow up, with me, in person.    Bing Fung MD  M  [facility] BRENDA Coronel is a 61 year old who presents for the following health issues  accompanied by her spouse.    History of Present Illness       She eats 0-1 servings of fruits and vegetables daily.She consumes 1 sweetened beverage(s) daily.She exercises with enough effort to increase her heart rate 9 or less minutes per day.  She exercises with enough effort to increase her heart rate 3 or less days per week. She is missing 1 dose(s) of medications per week.  She is not taking prescribed medications regularly due to remembering to take.       Arthritis: Reviewed recent lab work in detail with patient and her  today.  Findings notable for positive rheumatoid factor and elevated CRP.  Patient family history significant for rheumatoid arthritis in mother and sister.  Patient continues to have diffuse joint pain and stiffness.    Iron deficiency anemia -related to bleeding ulcers.  Hemoglobin has now stabilized the normal range.  Patient remains on iron supplementation and energy level is much improved.    Hx of GI bleed - hiatal hernia with multiple Inderjit ulcers and stigmata of recent bleeding on upper GI endoscopy from September.  Patient notes significant provement since this time.  Remains on PPI.    Depression/anxiety-severe anxiety related to the pandemic, with parents in assisted living facility.  Patient was prescribed citalopram previously, but had held off starting until other medical issues were resolved.  Anxiety has been worsening recently.    Concern for sleep apnea: Has been with her today and notes that her sleep quality is very poor and he has noted some apneic episodes.    Lower extremity edema: Strong family history, reassuring echocardiogram as part of her work-up.  Patient has been using compression stockings.  Still has significant bilateral lower extremity edema by the end of the day causes an achy sensation.  Bilateral lower extremity ultrasounds were  negative for DVTs.          Objective    /74   Pulse 80   Temp 97.7  F (36.5  C) (Tympanic)   Resp 16   Wt 110.9 kg (244 lb 6.4 oz)   SpO2 97%   BMI 40.67 kg/m    Body mass index is 40.67 kg/m .   Wt Readings from Last 4 Encounters:   12/27/21 110.9 kg (244 lb 6.4 oz)   10/28/21 109.9 kg (242 lb 4.8 oz)   09/16/21 110.4 kg (243 lb 6.4 oz)   09/03/21 108.5 kg (239 lb 1.6 oz)       Physical Exam   GENERAL: healthy, alert and no distress  MS: 1+ pitting edema to mid calf bilaterally  SKIN: no suspicious lesions or rashes  PSYCH: mentation appears normal, affect normal/bright    Recent lab results reviewed in detail in epic    Bing Fung MD

## 2021-12-27 NOTE — PATIENT INSTRUCTIONS
I love the idea of the [employer]    Moderna booster today    Call for visit with Dr Hall (Rheumatology) and with Sleep Medicine ([place])    Start the citalopram    Keep on the iron - at least every other day    Trial of lasix once daily for the next week to see if it helps your swelling - send me an update with how this goes    Come back to see me in 6-8 weeks - sooner with worsening

## 2022-02-16 ENCOUNTER — DOCUMENTATION ONLY (OUTPATIENT)
Dept: LAB | Facility: CLINIC | Age: 62
End: 2022-02-16
Payer: COMMERCIAL

## 2022-02-16 DIAGNOSIS — Z87.19 HISTORY OF GI BLEED: Primary | ICD-10-CM

## 2022-02-16 DIAGNOSIS — D50.0 IRON DEFICIENCY ANEMIA DUE TO CHRONIC BLOOD LOSS: ICD-10-CM

## 2022-02-17 ENCOUNTER — TRANSFERRED RECORDS (OUTPATIENT)
Dept: HEALTH INFORMATION MANAGEMENT | Facility: CLINIC | Age: 62
End: 2022-02-17
Payer: COMMERCIAL

## 2022-02-18 ENCOUNTER — LAB (OUTPATIENT)
Dept: LAB | Facility: CLINIC | Age: 62
End: 2022-02-18
Payer: COMMERCIAL

## 2022-02-18 DIAGNOSIS — Z87.19 HISTORY OF GI BLEED: ICD-10-CM

## 2022-02-18 DIAGNOSIS — D50.0 IRON DEFICIENCY ANEMIA DUE TO CHRONIC BLOOD LOSS: ICD-10-CM

## 2022-02-18 LAB
ERYTHROCYTE [DISTWIDTH] IN BLOOD BY AUTOMATED COUNT: 15.4 % (ref 10–15)
HCT VFR BLD AUTO: 44 % (ref 35–47)
HGB BLD-MCNC: 14.1 G/DL (ref 11.7–15.7)
MCH RBC QN AUTO: 29.4 PG (ref 26.5–33)
MCHC RBC AUTO-ENTMCNC: 32 G/DL (ref 31.5–36.5)
MCV RBC AUTO: 92 FL (ref 78–100)
PLATELET # BLD AUTO: 350 10E3/UL (ref 150–450)
RBC # BLD AUTO: 4.8 10E6/UL (ref 3.8–5.2)
WBC # BLD AUTO: 10.9 10E3/UL (ref 4–11)

## 2022-02-18 PROCEDURE — 83550 IRON BINDING TEST: CPT

## 2022-02-18 PROCEDURE — 82728 ASSAY OF FERRITIN: CPT

## 2022-02-18 PROCEDURE — 80048 BASIC METABOLIC PNL TOTAL CA: CPT

## 2022-02-18 PROCEDURE — 36415 COLL VENOUS BLD VENIPUNCTURE: CPT

## 2022-02-18 PROCEDURE — 85027 COMPLETE CBC AUTOMATED: CPT

## 2022-02-19 LAB
ANION GAP SERPL CALCULATED.3IONS-SCNC: 7 MMOL/L (ref 3–14)
BUN SERPL-MCNC: 15 MG/DL (ref 7–30)
CALCIUM SERPL-MCNC: 9.5 MG/DL (ref 8.5–10.1)
CHLORIDE BLD-SCNC: 104 MMOL/L (ref 94–109)
CO2 SERPL-SCNC: 28 MMOL/L (ref 20–32)
CREAT SERPL-MCNC: 0.78 MG/DL (ref 0.52–1.04)
FERRITIN SERPL-MCNC: 15 NG/ML (ref 8–252)
GFR SERPL CREATININE-BSD FRML MDRD: 85 ML/MIN/1.73M2
GLUCOSE BLD-MCNC: 107 MG/DL (ref 70–99)
IRON SATN MFR SERPL: 15 % (ref 15–46)
IRON SERPL-MCNC: 54 UG/DL (ref 35–180)
POTASSIUM BLD-SCNC: 4.3 MMOL/L (ref 3.4–5.3)
SODIUM SERPL-SCNC: 139 MMOL/L (ref 133–144)
TIBC SERPL-MCNC: 370 UG/DL (ref 240–430)

## 2022-02-22 ENCOUNTER — OFFICE VISIT (OUTPATIENT)
Dept: PEDIATRICS | Facility: CLINIC | Age: 62
End: 2022-02-22
Payer: COMMERCIAL

## 2022-02-22 VITALS
OXYGEN SATURATION: 97 % | RESPIRATION RATE: 16 BRPM | WEIGHT: 241.7 LBS | DIASTOLIC BLOOD PRESSURE: 60 MMHG | HEART RATE: 91 BPM | TEMPERATURE: 98 F | BODY MASS INDEX: 40.22 KG/M2 | SYSTOLIC BLOOD PRESSURE: 110 MMHG

## 2022-02-22 DIAGNOSIS — G47.30 SLEEP APNEA, UNSPECIFIED TYPE: ICD-10-CM

## 2022-02-22 DIAGNOSIS — Z87.19 HISTORY OF GI BLEED: Primary | ICD-10-CM

## 2022-02-22 DIAGNOSIS — F33.1 MAJOR DEPRESSIVE DISORDER, RECURRENT EPISODE, MODERATE (H): ICD-10-CM

## 2022-02-22 DIAGNOSIS — M05.9 RHEUMATOID ARTHRITIS WITH POSITIVE RHEUMATOID FACTOR, INVOLVING UNSPECIFIED SITE (H): ICD-10-CM

## 2022-02-22 DIAGNOSIS — K44.9 HIATAL HERNIA: ICD-10-CM

## 2022-02-22 DIAGNOSIS — M17.0 PRIMARY OSTEOARTHRITIS OF BOTH KNEES: ICD-10-CM

## 2022-02-22 DIAGNOSIS — R53.83 FATIGUE, UNSPECIFIED TYPE: ICD-10-CM

## 2022-02-22 LAB — TSH SERPL DL<=0.005 MIU/L-ACNC: 2.42 MU/L (ref 0.4–4)

## 2022-02-22 PROCEDURE — 84443 ASSAY THYROID STIM HORMONE: CPT | Performed by: PEDIATRICS

## 2022-02-22 PROCEDURE — 99214 OFFICE O/P EST MOD 30 MIN: CPT | Performed by: PEDIATRICS

## 2022-02-22 ASSESSMENT — ANXIETY QUESTIONNAIRES
2. NOT BEING ABLE TO STOP OR CONTROL WORRYING: NOT AT ALL
GAD7 TOTAL SCORE: 1
7. FEELING AFRAID AS IF SOMETHING AWFUL MIGHT HAPPEN: NOT AT ALL
6. BECOMING EASILY ANNOYED OR IRRITABLE: NOT AT ALL
1. FEELING NERVOUS, ANXIOUS, OR ON EDGE: NOT AT ALL
3. WORRYING TOO MUCH ABOUT DIFFERENT THINGS: SEVERAL DAYS
5. BEING SO RESTLESS THAT IT IS HARD TO SIT STILL: NOT AT ALL
IF YOU CHECKED OFF ANY PROBLEMS ON THIS QUESTIONNAIRE, HOW DIFFICULT HAVE THESE PROBLEMS MADE IT FOR YOU TO DO YOUR WORK, TAKE CARE OF THINGS AT HOME, OR GET ALONG WITH OTHER PEOPLE: NOT DIFFICULT AT ALL

## 2022-02-22 ASSESSMENT — PATIENT HEALTH QUESTIONNAIRE - PHQ9
5. POOR APPETITE OR OVEREATING: NOT AT ALL
SUM OF ALL RESPONSES TO PHQ QUESTIONS 1-9: 4

## 2022-02-22 NOTE — PATIENT INSTRUCTIONS
Keep thinking about starting citalopram    Schedule consultation with the surgeons    Keep on your supplements    I'll stay tuned to findings from Dr Rafael Arteaga when you are ready for a sleep referral - I would highly recommend doing this!    I love that you are thinking about getting back to water aerobics

## 2022-02-22 NOTE — PROGRESS NOTES
Assessment & Plan        ICD-10-CM    1. History of GI bleed  Z87.19 Adult General Surg Referral     **CBC with platelets differential FUTURE 2mo     **Iron and iron binding capacity FUTURE 2mo     **Ferritin FUTURE 2mo    Plan repeat visit with labs in 2 months, continue iron supplement - Inderjit Ulcer cause of bleeding, encouraged to continue PPI and see gen surg     2. Hiatal hernia  K44.9 Adult General Surg Referral  Plan on surgical consultation     3. Fatigue, unspecified type  R53.83 TSH with free T4 reflex  Will add on to recent lab work, historically normal    Fatigue improving with correction of anemia     4. Rheumatoid arthritis with positive rheumatoid factor, involving unspecified site (H)  M05.9 Appreciate consultation from Dr Hall - await additional records   5. Major depressive disorder, recurrent episode, moderate (H)  F33.1 Much improved - continue to follow - patient has citalopram at home, hasn't yet started     6. Sleep apnea, unspecified type  G47.30 Patient to alert me when ready for sleep referral - encouraged to pursue     7. Primary osteoarthritis of both knees  M17.0 Patient checking with insurance on which orthopedic groups are covered - has historically been seen at [facility]       See Patient Instructions    Return in about 4 months (around 6/22/2022) for Follow up, with me, in person.    Bing Fung MD  [facility] BRENDA Coronel is a 61 year old who presents for the following health issues    History of Present Illness     Reason for visit:  Follow up for Anemia, Hernia, Blood Pressure, RA, Depression  Symptom onset:  More than a month  Symptom intensity:  Mild  Symptom progression:  Improving  Had these symptoms before:  Yes  Has tried/received treatment for these symptoms:  No    She eats 0-1 servings of fruits and vegetables daily.She consumes 2 sweetened beverage(s) daily.She exercises with enough effort to increase her heart rate 9 or less  minutes per day.  She exercises with enough effort to increase her heart rate 3 or less days per week. She is missing 1 dose(s) of medications per week.  She is not taking prescribed medications regularly due to other.       Depression - feeling much better.  Still hasn't started her citalopram, but has it at home and is thinking about it.   Mood much better since her physical health is moving in the right direction.    Concern for RA - saw Dr Hall - saw her 2/17 and has follow up visit in 2 weeks.  Records obtained and reviewed, submitted for abstraction.   Films appear more consistent with OA than RA - will await Dr Hall's expert opinion and results of additional lab work drawn last week    Anemia from Inderjit Ulcer - Hgb now in normal range.  Iron stores at the low end of normal range.   Large hiatal hernia.   Nervous about being on omeprazole long term.   Hasn't yet met with general surgery to discuss hiatal hernia surgical correction, but willing to do this soon.   Still noticing some blood in her stool intermittently.    Folate, B12, iron supplement, D3 daily    Continues to stay up very late (2-3am) - but easier time falling asleep than she had historically.   Snoring.       Knees are bothering her a lot - wondering about seeing orthopedics.  Did well with a cortisone injection in her hip historically.    Stairs are getting difficult.   Working to slowly get back into exercise - [employer], home exercise program.  Really likes water aerobics.          Review of Systems   Constitutional, HEENT, cardiovascular, pulmonary, gi and gu systems are negative, except as otherwise noted.      Objective    /60 (BP Location: Right arm, Patient Position: Sitting, Cuff Size: Adult Large)   Pulse 91   Temp 98  F (36.7  C) (Tympanic)   Resp 16   Wt 109.6 kg (241 lb 11.2 oz)   SpO2 97%   BMI 40.22 kg/m    Body mass index is 40.22 kg/m .   Wt Readings from Last 4 Encounters:   02/22/22 109.6 kg (241 lb 11.2 oz)    12/27/21 110.9 kg (244 lb 6.4 oz)   10/28/21 109.9 kg (242 lb 4.8 oz)   09/16/21 110.4 kg (243 lb 6.4 oz)       Physical Exam   GENERAL: healthy, alert and no distress  RESP: lungs clear to auscultation - no rales, rhonchi or wheezes  CV: regular rates and rhythm, normal S1 S2, no S3 or S4, no murmur, click or rub, peripheral pulses strong and trace+ bilateral lower extremity edema to ankles - compression stockings in place  PSYCH: mentation appears normal, affect normal/bright    Reviewed with patient    Bing Fung MD

## 2022-02-23 ASSESSMENT — ANXIETY QUESTIONNAIRES: GAD7 TOTAL SCORE: 1

## 2022-03-10 ENCOUNTER — TRANSFERRED RECORDS (OUTPATIENT)
Dept: HEALTH INFORMATION MANAGEMENT | Facility: CLINIC | Age: 62
End: 2022-03-10
Payer: COMMERCIAL

## 2022-06-23 ENCOUNTER — LAB (OUTPATIENT)
Dept: LAB | Facility: CLINIC | Age: 62
End: 2022-06-23
Payer: COMMERCIAL

## 2022-06-23 DIAGNOSIS — Z87.19 HISTORY OF GI BLEED: ICD-10-CM

## 2022-06-23 LAB
BASOPHILS # BLD AUTO: 0.1 10E3/UL (ref 0–0.2)
BASOPHILS NFR BLD AUTO: 1 %
EOSINOPHIL # BLD AUTO: 0.3 10E3/UL (ref 0–0.7)
EOSINOPHIL NFR BLD AUTO: 3 %
ERYTHROCYTE [DISTWIDTH] IN BLOOD BY AUTOMATED COUNT: 13.5 % (ref 10–15)
HCT VFR BLD AUTO: 43.5 % (ref 35–47)
HGB BLD-MCNC: 14.2 G/DL (ref 11.7–15.7)
LYMPHOCYTES # BLD AUTO: 2.4 10E3/UL (ref 0.8–5.3)
LYMPHOCYTES NFR BLD AUTO: 24 %
MCH RBC QN AUTO: 30.1 PG (ref 26.5–33)
MCHC RBC AUTO-ENTMCNC: 32.6 G/DL (ref 31.5–36.5)
MCV RBC AUTO: 92 FL (ref 78–100)
MONOCYTES # BLD AUTO: 1.1 10E3/UL (ref 0–1.3)
MONOCYTES NFR BLD AUTO: 11 %
NEUTROPHILS # BLD AUTO: 6 10E3/UL (ref 1.6–8.3)
NEUTROPHILS NFR BLD AUTO: 61 %
PLATELET # BLD AUTO: 311 10E3/UL (ref 150–450)
RBC # BLD AUTO: 4.72 10E6/UL (ref 3.8–5.2)
WBC # BLD AUTO: 9.9 10E3/UL (ref 4–11)

## 2022-06-23 PROCEDURE — 82728 ASSAY OF FERRITIN: CPT

## 2022-06-23 PROCEDURE — 85025 COMPLETE CBC W/AUTO DIFF WBC: CPT

## 2022-06-23 PROCEDURE — 36415 COLL VENOUS BLD VENIPUNCTURE: CPT

## 2022-06-23 PROCEDURE — 83550 IRON BINDING TEST: CPT

## 2022-06-24 LAB
FERRITIN SERPL-MCNC: 15 NG/ML (ref 8–252)
IRON SATN MFR SERPL: 23 % (ref 15–46)
IRON SERPL-MCNC: 78 UG/DL (ref 35–180)
TIBC SERPL-MCNC: 346 UG/DL (ref 240–430)

## 2022-06-27 ENCOUNTER — OFFICE VISIT (OUTPATIENT)
Dept: PEDIATRICS | Facility: CLINIC | Age: 62
End: 2022-06-27
Payer: COMMERCIAL

## 2022-06-27 VITALS
HEIGHT: 66 IN | SYSTOLIC BLOOD PRESSURE: 128 MMHG | WEIGHT: 247.3 LBS | TEMPERATURE: 97.7 F | OXYGEN SATURATION: 97 % | BODY MASS INDEX: 39.74 KG/M2 | HEART RATE: 76 BPM | DIASTOLIC BLOOD PRESSURE: 72 MMHG | RESPIRATION RATE: 16 BRPM

## 2022-06-27 DIAGNOSIS — Z23 NEED FOR COVID-19 VACCINE: ICD-10-CM

## 2022-06-27 DIAGNOSIS — K44.9 HIATAL HERNIA: ICD-10-CM

## 2022-06-27 DIAGNOSIS — M15.0 PRIMARY OSTEOARTHRITIS INVOLVING MULTIPLE JOINTS: ICD-10-CM

## 2022-06-27 DIAGNOSIS — D50.0 BLOOD LOSS ANEMIA: ICD-10-CM

## 2022-06-27 DIAGNOSIS — E66.01 MORBID OBESITY (H): ICD-10-CM

## 2022-06-27 DIAGNOSIS — Z87.19 HISTORY OF GI BLEED: Primary | ICD-10-CM

## 2022-06-27 PROCEDURE — 91305 COVID-19,PF,PFIZER (12+ YRS): CPT | Performed by: PEDIATRICS

## 2022-06-27 PROCEDURE — 99214 OFFICE O/P EST MOD 30 MIN: CPT | Mod: 25 | Performed by: PEDIATRICS

## 2022-06-27 PROCEDURE — 0054A COVID-19,PF,PFIZER (12+ YRS): CPT | Performed by: PEDIATRICS

## 2022-06-27 NOTE — PROGRESS NOTES
"  Assessment & Plan       ICD-10-CM    1. History of GI bleed  Z87.19 Secondary to erosions in hiatal hernia - surgery referral in place - patient contemplating eval.  Remains on iron supplements and stopped NSAIDS.  Hgb now normal.       2. Hiatal hernia  K44.9 See above     3. Blood loss anemia  D50.0 Now normalized     4. Need for COVID-19 vaccine  Z23 COVID-19,PF,PFIZER (12+ YRS)     5. Morbid obesity (H)  E66.01 Hoping to increase activity now that feeling better with anemia resolved - enjoys water aerobic activities     6. Primary osteoarthritis involving multiple joints  M89.49 Plan to restart joint supplement that was previously helpful (Marija) - will alert me if/when ready for orthopedics eval     BMI:   Estimated body mass index is 39.92 kg/m  as calculated from the following:    Height as of this encounter: 1.676 m (5' 6\").    Weight as of this encounter: 112.2 kg (247 lb 4.8 oz).   Weight management plan: Discussed healthy diet and exercise guidelines    See Patient Instructions    Return in about 4 months (around 10/27/2022) for Routine preventive, with me, in person.    Bing Fung MD  [facility] BRENDA Coronel is a 62 year old presenting for the following health issues:  Follow Up (GI, anemia, and arthritis F/u)      History of Present Illness       Reason for visit:  Follow up for GI bleed, anemia, arthritis    She eats 0-1 servings of fruits and vegetables daily.She consumes 2 sweetened beverage(s) daily.She exercises with enough effort to increase her heart rate 9 or less minutes per day.  She exercises with enough effort to increase her heart rate 3 or less days per week.   She is taking medications regularly.       Mood - doing well - hasn't yet started the citalopram, but has it at home.   Summer typically a better time for her.  [place] been [place] as Covid restrictions have decreased, and this has been good.    TERENCE - now " "normalized after GI bleed.   Still on vitron C daily.    Hx of Gi bleed/hiatal hernia - Hgb of 6 last year - required transfusion and hospitalization.  No perceived blood loss since that time.  Had surgery consult placed at last visit.  Hasn't had much in the way of reflux or abdominal pain.  Only struggles when having certain foods late at night.      ? RUBEN - thinking about a sleep study - not quite ready.  Has some day/night reversal.  Doing ok with this.    RA/OA - following with Dr Hall.  Presumed OA at this point.      Bilateral knee pain and foot pain most problematic.         Review of Systems   Constitutional, cardiovascular, pulmonary, gi and msk, psych systems are negative, except as otherwise noted.      Objective    /72 (BP Location: Right arm, Patient Position: Chair, Cuff Size: Adult Large)   Pulse 76   Temp 97.7  F (36.5  C) (Tympanic)   Resp 16   Ht 1.676 m (5' 6\")   Wt 112.2 kg (247 lb 4.8 oz)   SpO2 97%   BMI 39.92 kg/m    Body mass index is 39.92 kg/m .   Wt Readings from Last 4 Encounters:   06/27/22 112.2 kg (247 lb 4.8 oz)   02/22/22 109.6 kg (241 lb 11.2 oz)   12/27/21 110.9 kg (244 lb 6.4 oz)   10/28/21 109.9 kg (242 lb 4.8 oz)       Physical Exam   GENERAL: healthy, alert and no distress  RESP: lungs clear to auscultation - no rales, rhonchi or wheezes  CV: regular rates and rhythm, normal S1 S2, no S3 or S4, no murmur, click or rub, peripheral pulses strong and trace+ bilateral lower extremity pitting edema to feet    PSYCH: mentation appears normal, affect normal/bright    Labs reviewed - hgb now normal, iron stores improving    Bing Fung MD          .  ..  "

## 2022-06-27 NOTE — PATIENT INSTRUCTIONS
Revisit glucosamine-chondroitin supplements    Keep up your Vitron C    Fruit smoothies with Marija juice    Keep thinking about seeing surgery for your hiatal hernia     Keep thinking about the possibility of sleep medicine    COVID booster today - thank you!!!    Think about water walking again - this would be great

## 2022-09-26 ENCOUNTER — TRANSFERRED RECORDS (OUTPATIENT)
Dept: HEALTH INFORMATION MANAGEMENT | Facility: CLINIC | Age: 62
End: 2022-09-26

## 2022-10-16 ENCOUNTER — HEALTH MAINTENANCE LETTER (OUTPATIENT)
Age: 62
End: 2022-10-16

## 2022-10-18 ENCOUNTER — DOCUMENTATION ONLY (OUTPATIENT)
Dept: LAB | Facility: CLINIC | Age: 62
End: 2022-10-18

## 2022-10-18 DIAGNOSIS — Z13.220 SCREENING CHOLESTEROL LEVEL: ICD-10-CM

## 2022-10-18 DIAGNOSIS — Z83.49 FAMILY HISTORY OF THYROID DISORDER: ICD-10-CM

## 2022-10-18 DIAGNOSIS — Z87.19 HISTORY OF GI BLEED: Primary | ICD-10-CM

## 2022-10-18 DIAGNOSIS — D50.0 IRON DEFICIENCY ANEMIA DUE TO CHRONIC BLOOD LOSS: ICD-10-CM

## 2022-10-18 DIAGNOSIS — E66.01 MORBID OBESITY (H): ICD-10-CM

## 2022-10-18 NOTE — PROGRESS NOTES
"Patient has lab only appt 10/27/22 for \"fasting labs\", no orders in chart.  Please place FUTURE orders in Epic if appropriate.    Thanks,   lab    "

## 2022-10-25 ENCOUNTER — TRANSFERRED RECORDS (OUTPATIENT)
Dept: HEALTH INFORMATION MANAGEMENT | Facility: CLINIC | Age: 62
End: 2022-10-25

## 2022-10-27 ENCOUNTER — LAB (OUTPATIENT)
Dept: LAB | Facility: CLINIC | Age: 62
End: 2022-10-27
Payer: COMMERCIAL

## 2022-10-27 DIAGNOSIS — E66.01 MORBID OBESITY (H): ICD-10-CM

## 2022-10-27 DIAGNOSIS — Z83.49 FAMILY HISTORY OF THYROID DISORDER: ICD-10-CM

## 2022-10-27 DIAGNOSIS — Z13.220 SCREENING CHOLESTEROL LEVEL: ICD-10-CM

## 2022-10-27 DIAGNOSIS — Z87.19 HISTORY OF GI BLEED: ICD-10-CM

## 2022-10-27 DIAGNOSIS — D50.0 IRON DEFICIENCY ANEMIA DUE TO CHRONIC BLOOD LOSS: ICD-10-CM

## 2022-10-27 LAB
ERYTHROCYTE [DISTWIDTH] IN BLOOD BY AUTOMATED COUNT: 13 % (ref 10–15)
HCT VFR BLD AUTO: 42.5 % (ref 35–47)
HGB BLD-MCNC: 13.4 G/DL (ref 11.7–15.7)
MCH RBC QN AUTO: 29.1 PG (ref 26.5–33)
MCHC RBC AUTO-ENTMCNC: 31.5 G/DL (ref 31.5–36.5)
MCV RBC AUTO: 92 FL (ref 78–100)
PLATELET # BLD AUTO: 410 10E3/UL (ref 150–450)
RBC # BLD AUTO: 4.6 10E6/UL (ref 3.8–5.2)
WBC # BLD AUTO: 9.1 10E3/UL (ref 4–11)

## 2022-10-27 PROCEDURE — 85027 COMPLETE CBC AUTOMATED: CPT

## 2022-10-27 PROCEDURE — 84443 ASSAY THYROID STIM HORMONE: CPT

## 2022-10-27 PROCEDURE — 82728 ASSAY OF FERRITIN: CPT

## 2022-10-27 PROCEDURE — 80061 LIPID PANEL: CPT

## 2022-10-27 PROCEDURE — 36415 COLL VENOUS BLD VENIPUNCTURE: CPT

## 2022-10-27 PROCEDURE — 80053 COMPREHEN METABOLIC PANEL: CPT

## 2022-10-28 LAB
ALBUMIN SERPL-MCNC: 3.3 G/DL (ref 3.4–5)
ALP SERPL-CCNC: 103 U/L (ref 40–150)
ALT SERPL W P-5'-P-CCNC: 21 U/L (ref 0–50)
ANION GAP SERPL CALCULATED.3IONS-SCNC: 6 MMOL/L (ref 3–14)
AST SERPL W P-5'-P-CCNC: 12 U/L (ref 0–45)
BILIRUB SERPL-MCNC: 0.5 MG/DL (ref 0.2–1.3)
BUN SERPL-MCNC: 14 MG/DL (ref 7–30)
CALCIUM SERPL-MCNC: 9.9 MG/DL (ref 8.5–10.1)
CHLORIDE BLD-SCNC: 105 MMOL/L (ref 94–109)
CHOLEST SERPL-MCNC: 215 MG/DL
CO2 SERPL-SCNC: 29 MMOL/L (ref 20–32)
CREAT SERPL-MCNC: 0.72 MG/DL (ref 0.52–1.04)
FASTING STATUS PATIENT QL REPORTED: YES
FERRITIN SERPL-MCNC: 10 NG/ML (ref 8–252)
GFR SERPL CREATININE-BSD FRML MDRD: >90 ML/MIN/1.73M2
GLUCOSE BLD-MCNC: 107 MG/DL (ref 70–99)
HDLC SERPL-MCNC: 95 MG/DL
LDLC SERPL CALC-MCNC: 100 MG/DL
NONHDLC SERPL-MCNC: 120 MG/DL
POTASSIUM BLD-SCNC: 4.3 MMOL/L (ref 3.4–5.3)
PROT SERPL-MCNC: 6.6 G/DL (ref 6.8–8.8)
SODIUM SERPL-SCNC: 140 MMOL/L (ref 133–144)
TRIGL SERPL-MCNC: 101 MG/DL
TSH SERPL DL<=0.005 MIU/L-ACNC: 2.33 MU/L (ref 0.4–4)

## 2022-10-31 SDOH — ECONOMIC STABILITY: FOOD INSECURITY: WITHIN THE PAST 12 MONTHS, THE FOOD YOU BOUGHT JUST DIDN'T LAST AND YOU DIDN'T HAVE MONEY TO GET MORE.: NEVER TRUE

## 2022-10-31 SDOH — ECONOMIC STABILITY: TRANSPORTATION INSECURITY
IN THE PAST 12 MONTHS, HAS THE LACK OF TRANSPORTATION KEPT YOU FROM MEDICAL APPOINTMENTS OR FROM GETTING MEDICATIONS?: NO

## 2022-10-31 SDOH — ECONOMIC STABILITY: INCOME INSECURITY: HOW HARD IS IT FOR YOU TO PAY FOR THE VERY BASICS LIKE FOOD, HOUSING, MEDICAL CARE, AND HEATING?: NOT VERY HARD

## 2022-10-31 SDOH — ECONOMIC STABILITY: FOOD INSECURITY: WITHIN THE PAST 12 MONTHS, YOU WORRIED THAT YOUR FOOD WOULD RUN OUT BEFORE YOU GOT MONEY TO BUY MORE.: NEVER TRUE

## 2022-10-31 SDOH — ECONOMIC STABILITY: INCOME INSECURITY: IN THE LAST 12 MONTHS, WAS THERE A TIME WHEN YOU WERE NOT ABLE TO PAY THE MORTGAGE OR RENT ON TIME?: NO

## 2022-10-31 SDOH — HEALTH STABILITY: PHYSICAL HEALTH: ON AVERAGE, HOW MANY MINUTES DO YOU ENGAGE IN EXERCISE AT THIS LEVEL?: 0 MIN

## 2022-10-31 SDOH — ECONOMIC STABILITY: TRANSPORTATION INSECURITY
IN THE PAST 12 MONTHS, HAS LACK OF TRANSPORTATION KEPT YOU FROM MEETINGS, WORK, OR FROM GETTING THINGS NEEDED FOR DAILY LIVING?: NO

## 2022-10-31 SDOH — HEALTH STABILITY: PHYSICAL HEALTH: ON AVERAGE, HOW MANY DAYS PER WEEK DO YOU ENGAGE IN MODERATE TO STRENUOUS EXERCISE (LIKE A BRISK WALK)?: 0 DAYS

## 2022-10-31 ASSESSMENT — ENCOUNTER SYMPTOMS
HEARTBURN: 0
SHORTNESS OF BREATH: 0
BREAST MASS: 0
DYSURIA: 0
WEAKNESS: 0
SORE THROAT: 0
CHILLS: 0
NERVOUS/ANXIOUS: 1
FEVER: 0
DIZZINESS: 1
PARESTHESIAS: 1
EYE PAIN: 0
DIARRHEA: 0
FREQUENCY: 0
HEADACHES: 0
HEMATURIA: 0
MYALGIAS: 0
JOINT SWELLING: 1
CONSTIPATION: 0
ARTHRALGIAS: 1
ABDOMINAL PAIN: 0
COUGH: 0
NAUSEA: 0
HEMATOCHEZIA: 0
PALPITATIONS: 0

## 2022-10-31 ASSESSMENT — LIFESTYLE VARIABLES
AUDIT-C TOTAL SCORE: 1
HOW OFTEN DO YOU HAVE A DRINK CONTAINING ALCOHOL: MONTHLY OR LESS
SKIP TO QUESTIONS 9-10: 1
HOW OFTEN DO YOU HAVE SIX OR MORE DRINKS ON ONE OCCASION: NEVER
HOW MANY STANDARD DRINKS CONTAINING ALCOHOL DO YOU HAVE ON A TYPICAL DAY: 1 OR 2

## 2022-10-31 ASSESSMENT — SOCIAL DETERMINANTS OF HEALTH (SDOH)
IN A TYPICAL WEEK, HOW MANY TIMES DO YOU TALK ON THE PHONE WITH FAMILY, FRIENDS, OR NEIGHBORS?: MORE THAN THREE TIMES A WEEK
HOW OFTEN DO YOU ATTEND CHURCH OR RELIGIOUS SERVICES?: NEVER
DO YOU BELONG TO ANY CLUBS OR ORGANIZATIONS SUCH AS CHURCH GROUPS UNIONS, FRATERNAL OR ATHLETIC GROUPS, OR SCHOOL GROUPS?: NO
HOW OFTEN DO YOU GET TOGETHER WITH FRIENDS OR RELATIVES?: MORE THAN THREE TIMES A WEEK

## 2022-11-01 ENCOUNTER — OFFICE VISIT (OUTPATIENT)
Dept: PEDIATRICS | Facility: CLINIC | Age: 62
End: 2022-11-01
Payer: COMMERCIAL

## 2022-11-01 ENCOUNTER — TRANSFERRED RECORDS (OUTPATIENT)
Dept: HEALTH INFORMATION MANAGEMENT | Facility: CLINIC | Age: 62
End: 2022-11-01

## 2022-11-01 VITALS
DIASTOLIC BLOOD PRESSURE: 76 MMHG | HEART RATE: 84 BPM | TEMPERATURE: 98.3 F | BODY MASS INDEX: 40.17 KG/M2 | OXYGEN SATURATION: 98 % | SYSTOLIC BLOOD PRESSURE: 126 MMHG | RESPIRATION RATE: 14 BRPM | WEIGHT: 248.9 LBS

## 2022-11-01 DIAGNOSIS — K44.9 HIATAL HERNIA: ICD-10-CM

## 2022-11-01 DIAGNOSIS — F33.1 MAJOR DEPRESSIVE DISORDER, RECURRENT EPISODE, MODERATE (H): ICD-10-CM

## 2022-11-01 DIAGNOSIS — Z87.19 HISTORY OF GI BLEED: ICD-10-CM

## 2022-11-01 DIAGNOSIS — R22.0: ICD-10-CM

## 2022-11-01 DIAGNOSIS — Z00.00 ROUTINE HISTORY AND PHYSICAL EXAMINATION OF ADULT: Primary | ICD-10-CM

## 2022-11-01 DIAGNOSIS — E66.01 MORBID OBESITY (H): ICD-10-CM

## 2022-11-01 DIAGNOSIS — M17.0 PRIMARY OSTEOARTHRITIS OF BOTH KNEES: ICD-10-CM

## 2022-11-01 DIAGNOSIS — F41.1 GAD (GENERALIZED ANXIETY DISORDER): ICD-10-CM

## 2022-11-01 PROBLEM — K92.2 UGIB (UPPER GASTROINTESTINAL BLEED): Status: RESOLVED | Noted: 2021-09-02 | Resolved: 2022-11-01

## 2022-11-01 PROCEDURE — 99214 OFFICE O/P EST MOD 30 MIN: CPT | Mod: 25 | Performed by: PEDIATRICS

## 2022-11-01 PROCEDURE — 0124A COVID-19,PF,PFIZER BOOSTER BIVALENT: CPT | Performed by: PEDIATRICS

## 2022-11-01 PROCEDURE — 90471 IMMUNIZATION ADMIN: CPT | Performed by: PEDIATRICS

## 2022-11-01 PROCEDURE — 96127 BRIEF EMOTIONAL/BEHAV ASSMT: CPT | Performed by: PEDIATRICS

## 2022-11-01 PROCEDURE — 99396 PREV VISIT EST AGE 40-64: CPT | Mod: 25 | Performed by: PEDIATRICS

## 2022-11-01 PROCEDURE — 91312 COVID-19,PF,PFIZER BOOSTER BIVALENT: CPT | Performed by: PEDIATRICS

## 2022-11-01 PROCEDURE — 90682 RIV4 VACC RECOMBINANT DNA IM: CPT | Performed by: PEDIATRICS

## 2022-11-01 RX ORDER — HYDROCODONE BITARTRATE AND ACETAMINOPHEN 5; 325 MG/1; MG/1
1 TABLET ORAL 2 TIMES DAILY PRN
Qty: 30 TABLET | Refills: 0 | Status: SHIPPED | OUTPATIENT
Start: 2022-11-01 | End: 2024-02-20

## 2022-11-01 RX ORDER — CITALOPRAM HYDROBROMIDE 10 MG/1
10 TABLET ORAL DAILY
Qty: 90 TABLET | Refills: 3 | Status: SHIPPED | OUTPATIENT
Start: 2022-11-01 | End: 2023-07-11

## 2022-11-01 ASSESSMENT — ENCOUNTER SYMPTOMS
BREAST MASS: 0
CONSTIPATION: 0
NERVOUS/ANXIOUS: 1
FREQUENCY: 0
COUGH: 0
PALPITATIONS: 0
CHILLS: 0
MYALGIAS: 0
HEMATOCHEZIA: 0
DYSURIA: 0
ARTHRALGIAS: 1
DIARRHEA: 0
ABDOMINAL PAIN: 0
DIZZINESS: 1
NAUSEA: 0
EYE PAIN: 0
FEVER: 0
WEAKNESS: 0
SORE THROAT: 0
HEARTBURN: 0
JOINT SWELLING: 1
HEMATURIA: 0
HEADACHES: 0
SHORTNESS OF BREATH: 0
PARESTHESIAS: 1

## 2022-11-01 ASSESSMENT — PATIENT HEALTH QUESTIONNAIRE - PHQ9
SUM OF ALL RESPONSES TO PHQ QUESTIONS 1-9: 6
10. IF YOU CHECKED OFF ANY PROBLEMS, HOW DIFFICULT HAVE THESE PROBLEMS MADE IT FOR YOU TO DO YOUR WORK, TAKE CARE OF THINGS AT HOME, OR GET ALONG WITH OTHER PEOPLE: SOMEWHAT DIFFICULT
SUM OF ALL RESPONSES TO PHQ QUESTIONS 1-9: 6

## 2022-11-01 ASSESSMENT — PAIN SCALES - GENERAL: PAINLEVEL: NO PAIN (0)

## 2022-11-01 NOTE — PATIENT INSTRUCTIONS
Please start the citalopram - raiza if you need more    Please make plans with Yariel    Think about weight loss medication options - Saxenda, Ozempic - let me know if you want to move forward    Vicodin (norco) to the pharmacy for your knee pain    OK to keep using CBD oil    Mammogram next year      Shingrix in the pharmacy this winter if interested          Preventive Health Recommendations  Female Ages 50 - 64    Yearly exam: See your health care provider every year in order to  Review health changes.   Discuss preventive care.    Review your medicines if your doctor has prescribed any.    Get a Pap test every three years (unless you have an abnormal result and your provider advises testing more often).  If you get Pap tests with HPV test, you only need to test every 5 years, unless you have an abnormal result.   You do not need a Pap test if your uterus was removed (hysterectomy) and you have not had cancer.  You should be tested each year for STDs (sexually transmitted diseases) if you're at risk.   Have a mammogram every 1 to 2 years.  Have a colonoscopy at age 50, or have a yearly FIT test (stool test). These exams screen for colon cancer.    Have a cholesterol test every 5 years, or more often if advised.  Have a diabetes test (fasting glucose) every three years. If you are at risk for diabetes, you should have this test more often.   If you are at risk for osteoporosis (brittle bone disease), think about having a bone density scan (DEXA).    Shots: Get a flu shot each year. Get a tetanus shot every 10 years.    Nutrition:   Eat at least 5 servings of fruits and vegetables each day.  Eat whole-grain bread, whole-wheat pasta and brown rice instead of white grains and rice.  Get adequate Calcium and Vitamin D.     Lifestyle  Exercise at least 150 minutes a week (30 minutes a day, 5 days a week). This will help you control your weight and prevent disease.  Limit alcohol to one drink per day.  No smoking.   Wear  sunscreen to prevent skin cancer.   See your dentist every six months for an exam and cleaning.  See your eye doctor every 1 to 2 years.

## 2022-11-01 NOTE — PROGRESS NOTES
SUBJECTIVE:   CC: Berna is an 62 year old who presents for preventive health visit.   Patient has been advised of split billing requirements and indicates understanding: Yes     Healthy Habits:     Getting at least 3 servings of Calcium per day:  NO    Bi-annual eye exam:  NO    Dental care twice a year:  Yes    Sleep apnea or symptoms of sleep apnea:  Excessive snoring    Diet:  Regular (no restrictions)    Frequency of exercise:  None    Taking medications regularly:  Yes    Medication side effects:  None    PHQ-2 Total Score: 3    Additional concerns today:  Yes      Today's PHQ-2 Score:   PHQ-2 ( 1999 Pfizer) 10/31/2022   Q1: Little interest or pleasure in doing things 1   Q2: Feeling down, depressed or hopeless 1   PHQ-2 Score 2   PHQ-2 Total Score (12-17 Years)- Positive if 3 or more points; Administer PHQ-A if positive -   Q1: Little interest or pleasure in doing things Several days   Q2: Feeling down, depressed or hopeless Several days   PHQ-2 Score 2       Abuse: Current or Past (Physical, Sexual or Emotional) - No  Do you feel safe in your environment? Yes      Social History     Tobacco Use     Smoking status: Never     Passive exposure: Yes     Smokeless tobacco: Never     Tobacco comments:     Live with a smoker   Substance Use Topics     Alcohol use: Yes     Comment: occasional , social         Alcohol Use 10/31/2022   Prescreen: >3 drinks/day or >7 drinks/week? No   Prescreen: >3 drinks/day or >7 drinks/week? -       Reviewed orders with patient.  Reviewed health maintenance and updated orders accordingly - Yes  Labs reviewed in EPIC  BP Readings from Last 3 Encounters:   11/01/22 (!) 144/80   06/27/22 128/72   02/22/22 110/60    Wt Readings from Last 3 Encounters:   11/01/22 112.9 kg (248 lb 14.4 oz)   06/27/22 112.2 kg (247 lb 4.8 oz)   02/22/22 109.6 kg (241 lb 11.2 oz)                    Breast Cancer Screening:    FHS-7:   Breast CA Risk Assessment (FHS-7) 9/14/2021   Did any of your  first-degree relatives have breast or ovarian cancer? No   Did any of your relatives have bilateral breast cancer? No   Did any man in your family have breast cancer? No   Did any woman in your family have breast and ovarian cancer? No   Did any woman in your family have breast cancer before age 50 y? No   Do you have 2 or more relatives with breast and/or ovarian cancer? No   Do you have 2 or more relatives with breast and/or bowel cancer? No       Mammogram Screening: Recommended mammography every 1-2 years with patient discussion and risk factor consideration  Pertinent mammograms are reviewed under the imaging tab.    History of abnormal Pap smear: Status post benign hysterectomy. Health Maintenance and Surgical History updated.     Reviewed and updated as needed this visit by clinical staff   Tobacco  Allergies  Meds   Med Hx  Surg Hx  Fam Hx  Soc Hx        Reviewed and updated as needed this visit by Provider                   Hx of GI bleed - most recent labs normalized - hx of erosions in hiatal hernia.  Now off NSAIDS due to this history.  Remains on iron supplements.      Primary OA in multiple joints producing debilitating pain  Knee and foot pain most troublesome historically and knees bothering her the most right now.  Working with [facility] - will need a knee replacement, but can't do it now as she is providing so much care to her ailing parents.    Has had some minor relief with voltaren gel and CBD oil preparations.  Pain can limit her ability to sleep at night.   Ambulation and exercise limited by end stage osteoarthritis.   Knees have been swelling more.   Had a steroid injection 9/30 that lasted for 3 weeks, then pain and swelling returns    Mood - depression and anxiety - hasn't yet started the citalopram, but open to doing so now.  Her  has been encouraging her to do so.   Closest friend moved away and parents have been very ill - stressful time.      Has had some swelling  inside the left cheek - has upcoming dentist visit to investigate further    Morbid obesity - working diligently to keep from gaining weight.       Review of Systems   Constitutional: Negative for chills and fever.   HENT: Negative for congestion, ear pain, hearing loss and sore throat.    Eyes: Negative for pain and visual disturbance.   Respiratory: Negative for cough and shortness of breath.    Cardiovascular: Positive for peripheral edema. Negative for chest pain and palpitations.   Gastrointestinal: Negative for abdominal pain, constipation, diarrhea, heartburn, hematochezia and nausea.   Breasts:  Negative for tenderness, breast mass and discharge.   Genitourinary: Negative for dysuria, frequency, genital sores, hematuria, pelvic pain, urgency, vaginal bleeding and vaginal discharge.   Musculoskeletal: Positive for arthralgias and joint swelling. Negative for myalgias.   Skin: Negative for rash.   Neurological: Positive for dizziness and paresthesias. Negative for weakness and headaches.   Psychiatric/Behavioral: Positive for mood changes. The patient is nervous/anxious.           OBJECTIVE:   BP (!) 144/80 (BP Location: Right arm, Patient Position: Sitting, Cuff Size: Adult Large)   Pulse 84   Temp 98.3  F (36.8  C) (Tympanic)   Resp 14   Wt 112.9 kg (248 lb 14.4 oz)   SpO2 98%   BMI 40.17 kg/m     Wt Readings from Last 4 Encounters:   11/01/22 112.9 kg (248 lb 14.4 oz)   06/27/22 112.2 kg (247 lb 4.8 oz)   02/22/22 109.6 kg (241 lb 11.2 oz)   12/27/21 110.9 kg (244 lb 6.4 oz)       Physical Exam  GENERAL: healthy, alert and no distress  EYES: Eyes grossly normal to inspection, PERRL and conjunctivae and sclerae normal  HENT: normal cephalic/atraumatic, ear canals and TM's normal, nose and mouth without ulcers or lesions, oral mucous membranes moist, normal appearing buccal mucosa, but slightly vaz at extreme back of left cheek - no purulence or fluctuance  NECK: no adenopathy, no asymmetry, masses,  or scars and thyroid normal to palpation  RESP: lungs clear to auscultation - no rales, rhonchi or wheezes  CV: regular rate and rhythm, normal S1 S2, no S3 or S4, no murmur, click or rub, no peripheral edema and peripheral pulses strong  ABDOMEN: soft, nontender, no hepatosplenomegaly, no masses and bowel sounds normal  MS: antalgic gait, swelling in knees and ankles bilaterally, limited knee flexion due to pain, no warmth or redness in joints today  SKIN: no suspicious lesions or rashes  NEURO: Normal strength and tone, mentation intact and speech normal  PSYCH: mentation appears normal, affect normal/bright    Diagnostic Test Results:  Labs reviewed in Epic    ASSESSMENT/PLAN:       ICD-10-CM    1. Routine history and physical examination of adult  Z00.00 Reviewed recent normal lab work      2. Hiatal hernia  K44.9 No acute symptoms      3. History of GI bleed  Z87.19 **Iron and iron binding capacity FUTURE 2mo     **Ferritin FUTURE 2mo     **CBC with platelets differential FUTURE 2mo    Has stopped NSAIDS - continue iron supplements and recheck labs prior to our visit in about 4 months      4. Primary osteoarthritis of both knees  M17.0 HYDROcodone-acetaminophen (NORCO) 5-325 MG tablet    Needs bilateral knee replacements, following with orthopedics, limited response to steroid injections.  Unable to schedule surgeries at present due to her care giving responsibilities with her parents.  Plan trial of norco - reviewed risks/benefits.  Patient does not drink alcohol and is aware not to drive on this medication.        5. Major depressive disorder, recurrent episode, moderate (H)  F33.1 citalopram (CELEXA) 10 MG tablet    Patient has had medication on hand, but hasn't yet started.  Highly encouraged starting and updating me with how she responds      6. JAROD (generalized anxiety disorder)  F41.1 citalopram (CELEXA) 10 MG tablet  See above      7. Swelling of buccal mucosa  R22.0 Has upcoming dental appt - will  "alert me if not improving        8. Morbid obesity (H)  E66.01 Complicating management of OA - discussed considering start of GLP-1 agonist.  Patient will research and alert me if interested in starting medication.              COUNSELING:  Reviewed preventive health counseling, as reflected in patient instructions    Estimated body mass index is 40.17 kg/m  as calculated from the following:    Height as of 6/27/22: 1.676 m (5' 6\").    Weight as of this encounter: 112.9 kg (248 lb 14.4 oz).    Weight management plan: Discussed healthy diet and exercise guidelines and recommended starting GLP-1 agonist    She reports that she has never smoked. She has been exposed to tobacco smoke. She has never used smokeless tobacco.      Counseling Resources:  ATP IV Guidelines  Pooled Cohorts Equation Calculator  Breast Cancer Risk Calculator  BRCA-Related Cancer Risk Assessment: FHS-7 Tool  FRAX Risk Assessment  ICSI Preventive Guidelines  Dietary Guidelines for Americans, 2010  [employer] MyPlate  ASA Prophylaxis  Lung CA Screening    Bing Fung MD  [facility] BRENDA  Answers for HPI/ROS submitted by the patient on 11/1/2022  If you checked off any problems, how difficult have these problems made it for you to do your work, take care of things at home, or get along with other people?: Somewhat difficult  PHQ9 TOTAL SCORE: 6      "

## 2023-01-10 ENCOUNTER — TRANSFERRED RECORDS (OUTPATIENT)
Dept: HEALTH INFORMATION MANAGEMENT | Facility: CLINIC | Age: 63
End: 2023-01-10

## 2023-02-28 ENCOUNTER — LAB (OUTPATIENT)
Dept: LAB | Facility: CLINIC | Age: 63
End: 2023-02-28
Payer: COMMERCIAL

## 2023-02-28 DIAGNOSIS — Z87.19 HISTORY OF GI BLEED: ICD-10-CM

## 2023-02-28 LAB
BASOPHILS # BLD AUTO: 0 10E3/UL (ref 0–0.2)
BASOPHILS NFR BLD AUTO: 0 %
EOSINOPHIL # BLD AUTO: 0.2 10E3/UL (ref 0–0.7)
EOSINOPHIL NFR BLD AUTO: 2 %
ERYTHROCYTE [DISTWIDTH] IN BLOOD BY AUTOMATED COUNT: 14.7 % (ref 10–15)
FERRITIN SERPL-MCNC: 17 NG/ML (ref 11–328)
HCT VFR BLD AUTO: 42.7 % (ref 35–47)
HGB BLD-MCNC: 13.7 G/DL (ref 11.7–15.7)
IRON BINDING CAPACITY (ROCHE): 321 UG/DL (ref 240–430)
IRON SATN MFR SERPL: 15 % (ref 15–46)
IRON SERPL-MCNC: 49 UG/DL (ref 37–145)
LYMPHOCYTES # BLD AUTO: 2.3 10E3/UL (ref 0.8–5.3)
LYMPHOCYTES NFR BLD AUTO: 20 %
MCH RBC QN AUTO: 28.5 PG (ref 26.5–33)
MCHC RBC AUTO-ENTMCNC: 32.1 G/DL (ref 31.5–36.5)
MCV RBC AUTO: 89 FL (ref 78–100)
MONOCYTES # BLD AUTO: 1.2 10E3/UL (ref 0–1.3)
MONOCYTES NFR BLD AUTO: 11 %
NEUTROPHILS # BLD AUTO: 7.6 10E3/UL (ref 1.6–8.3)
NEUTROPHILS NFR BLD AUTO: 67 %
PLATELET # BLD AUTO: 341 10E3/UL (ref 150–450)
RBC # BLD AUTO: 4.8 10E6/UL (ref 3.8–5.2)
WBC # BLD AUTO: 11.4 10E3/UL (ref 4–11)

## 2023-02-28 PROCEDURE — 83540 ASSAY OF IRON: CPT

## 2023-02-28 PROCEDURE — 83550 IRON BINDING TEST: CPT

## 2023-02-28 PROCEDURE — 85025 COMPLETE CBC W/AUTO DIFF WBC: CPT

## 2023-02-28 PROCEDURE — 36415 COLL VENOUS BLD VENIPUNCTURE: CPT

## 2023-02-28 PROCEDURE — 82728 ASSAY OF FERRITIN: CPT

## 2023-03-07 ENCOUNTER — OFFICE VISIT (OUTPATIENT)
Dept: PEDIATRICS | Facility: CLINIC | Age: 63
End: 2023-03-07
Payer: COMMERCIAL

## 2023-03-07 VITALS
TEMPERATURE: 97.8 F | DIASTOLIC BLOOD PRESSURE: 78 MMHG | SYSTOLIC BLOOD PRESSURE: 131 MMHG | OXYGEN SATURATION: 98 % | RESPIRATION RATE: 14 BRPM | WEIGHT: 253.2 LBS | HEART RATE: 82 BPM | BODY MASS INDEX: 40.87 KG/M2

## 2023-03-07 DIAGNOSIS — F41.1 GAD (GENERALIZED ANXIETY DISORDER): ICD-10-CM

## 2023-03-07 DIAGNOSIS — E66.01 MORBID OBESITY (H): ICD-10-CM

## 2023-03-07 DIAGNOSIS — D50.0 IRON DEFICIENCY ANEMIA DUE TO CHRONIC BLOOD LOSS: ICD-10-CM

## 2023-03-07 DIAGNOSIS — F33.1 MODERATE EPISODE OF RECURRENT MAJOR DEPRESSIVE DISORDER (H): Primary | ICD-10-CM

## 2023-03-07 PROCEDURE — 96127 BRIEF EMOTIONAL/BEHAV ASSMT: CPT | Performed by: PEDIATRICS

## 2023-03-07 PROCEDURE — 99215 OFFICE O/P EST HI 40 MIN: CPT | Performed by: PEDIATRICS

## 2023-03-07 RX ORDER — CITALOPRAM HYDROBROMIDE 20 MG/1
20 TABLET ORAL DAILY
Qty: 90 TABLET | Refills: 3 | Status: SHIPPED | OUTPATIENT
Start: 2023-03-07 | End: 2024-02-20

## 2023-03-07 ASSESSMENT — PATIENT HEALTH QUESTIONNAIRE - PHQ9
10. IF YOU CHECKED OFF ANY PROBLEMS, HOW DIFFICULT HAVE THESE PROBLEMS MADE IT FOR YOU TO DO YOUR WORK, TAKE CARE OF THINGS AT HOME, OR GET ALONG WITH OTHER PEOPLE: SOMEWHAT DIFFICULT
SUM OF ALL RESPONSES TO PHQ QUESTIONS 1-9: 5
SUM OF ALL RESPONSES TO PHQ QUESTIONS 1-9: 5

## 2023-03-07 ASSESSMENT — ANXIETY QUESTIONNAIRES
GAD7 TOTAL SCORE: 3
8. IF YOU CHECKED OFF ANY PROBLEMS, HOW DIFFICULT HAVE THESE MADE IT FOR YOU TO DO YOUR WORK, TAKE CARE OF THINGS AT HOME, OR GET ALONG WITH OTHER PEOPLE?: SOMEWHAT DIFFICULT
GAD7 TOTAL SCORE: 3
IF YOU CHECKED OFF ANY PROBLEMS ON THIS QUESTIONNAIRE, HOW DIFFICULT HAVE THESE PROBLEMS MADE IT FOR YOU TO DO YOUR WORK, TAKE CARE OF THINGS AT HOME, OR GET ALONG WITH OTHER PEOPLE: SOMEWHAT DIFFICULT
7. FEELING AFRAID AS IF SOMETHING AWFUL MIGHT HAPPEN: NOT AT ALL
5. BEING SO RESTLESS THAT IT IS HARD TO SIT STILL: NOT AT ALL
6. BECOMING EASILY ANNOYED OR IRRITABLE: SEVERAL DAYS
7. FEELING AFRAID AS IF SOMETHING AWFUL MIGHT HAPPEN: NOT AT ALL
GAD7 TOTAL SCORE: 3
4. TROUBLE RELAXING: NOT AT ALL
2. NOT BEING ABLE TO STOP OR CONTROL WORRYING: NOT AT ALL
3. WORRYING TOO MUCH ABOUT DIFFERENT THINGS: SEVERAL DAYS
1. FEELING NERVOUS, ANXIOUS, OR ON EDGE: SEVERAL DAYS

## 2023-03-07 ASSESSMENT — PAIN SCALES - GENERAL: PAINLEVEL: MILD PAIN (2)

## 2023-03-07 NOTE — PATIENT INSTRUCTIONS
Increase citalopram to 20mg daily and keep a close eye on your mood    Continue your iron supplements and I love your idea about adding more Vitamin C     [place] if you are interested in a referral for counseling    I'm hoping you can start seeing your good friend more often    [place]!    Keep up your work with orthopedics

## 2023-03-07 NOTE — PROGRESS NOTES
Assessment & Plan       ICD-10-CM    1. Moderate episode of recurrent major depressive disorder (H)  F33.1 citalopram (CELEXA) 20 MG tablet    Improved, but not completely optimized - increase citalopram dosing to 20mg.  Patient to alert me if interested in counseling orders at any time.  Feels that she is dealing with grief from losing her parents recently in a healthy fashion.      2. JAROD (generalized anxiety disorder)  F41.1 citalopram (CELEXA) 20 MG tablet  See above      3. Iron deficiency anemia due to chronic blood loss  D50.0 CBC with platelets     Ferritin     Iron and iron binding capacity  Continue iron supplementation, restart vit C supplement to help absorption.       If bleeding returns, will need to consider surgical consultation for treatment of hiatal hernia (where erosions have been located historically)        4. BMI 40  E66.01 Continue working on knee pain and physical therapy to promote more movement          Time spent in chart review, visit, and documentation, 48 minutes      Return in about 4 months (around 7/7/2023) for Follow up, with me, in person.    Bing Fung MD  [facility] BRENDA Coronel is a 62 year old, presenting for the following health issues:  Follow Up      History of Present Illness       Mental Health Follow-up:  Patient presents to follow-up on Depression & Anxiety.Patient's depression since last visit has been:  No change  The patient is not having other symptoms associated with depression.  Patient's anxiety since last visit has been:  No change  The patient is not having other symptoms associated with anxiety.  Any significant life events: grief or loss  Patient is not feeling anxious or having panic attacks.  Patient has no concerns about alcohol or drug use.    Reason for visit:  Follow up for anemia, Osteoarthritis of knees    She eats 0-1 servings of fruits and vegetables daily.She consumes 1 sweetened beverage(s) daily.She  exercises with enough effort to increase her heart rate 9 or less minutes per day.  She exercises with enough effort to increase her heart rate 3 or less days per week.   She is taking medications regularly.    Today's PHQ-9         PHQ-9 Total Score: 5    PHQ-9 Q9 Thoughts of better off dead/self-harm past 2 weeks :   Not at all    How difficult have these problems made it for you to do your work, take care of things at home, or get along with other people: Somewhat difficult  Today's JAROD-7 Score: 3     PHQ 2/22/2022 11/1/2022 3/7/2023   PHQ-9 Total Score 4 6 5   Q9: Thoughts of better off dead/self-harm past 2 weeks Not at all Not at all Not at all     JAROD-7 SCORE 9/2/2021 2/22/2022 3/7/2023   Total Score - - 3 (minimal anxiety)   Total Score 11 1 3       Hx of GI bleed from erosions in hiatal hernia.  Hasn't yet had surgical consultation.  Can't take NSAIDS.  During high stress time period when her parents' passed away this winter, had repeat pain and feels that she was bleeding again.  No anemia on labs last week, thankfully.  Continues on daily iron supplement.  Pain now resolved.    Hx of significant anemia from GI bleed - labs last week normal!    Primary OA in multiple joints causing debilitating knee pain - following with ortho - getting injections in knee - cortisone and gel injections have been helping.    Depression/anxiety - discussed citalopram at last visit and patient started in November - found it very helpful when dealing with her parents' illnesses and passing away.  Her  has suggested that she might benefit from counseling, but she is not certain.  Previously had significant caregiving responsibilities.  Closest friend moved about an hour away - hoping to see her more and to plan some travel this year.              Objective    /78 (BP Location: Right arm, Patient Position: Sitting, Cuff Size: Adult Large)   Pulse 82   Temp 97.8  F (36.6  C) (Tympanic)   Resp 14   Wt 114.9 kg  (253 lb 3.2 oz)   SpO2 98%   BMI 40.87 kg/m    Body mass index is 40.87 kg/m .   Wt Readings from Last 4 Encounters:   03/07/23 114.9 kg (253 lb 3.2 oz)   11/01/22 112.9 kg (248 lb 14.4 oz)   06/27/22 112.2 kg (247 lb 4.8 oz)   02/22/22 109.6 kg (241 lb 11.2 oz)       Physical Exam   GENERAL: healthy, alert and no distress  CV: regular rate and rhythm, normal S1 S2, no S3 or S4, no murmur, click or rub  ABDOMEN: soft, nontender, no masses    Labs reviewed    Bing Fung MD

## 2023-03-21 ENCOUNTER — TRANSFERRED RECORDS (OUTPATIENT)
Dept: HEALTH INFORMATION MANAGEMENT | Facility: CLINIC | Age: 63
End: 2023-03-21

## 2023-04-11 ENCOUNTER — TRANSFERRED RECORDS (OUTPATIENT)
Dept: HEALTH INFORMATION MANAGEMENT | Facility: CLINIC | Age: 63
End: 2023-04-11
Payer: COMMERCIAL

## 2023-05-16 ENCOUNTER — TRANSFERRED RECORDS (OUTPATIENT)
Dept: HEALTH INFORMATION MANAGEMENT | Facility: CLINIC | Age: 63
End: 2023-05-16
Payer: COMMERCIAL

## 2023-07-07 ENCOUNTER — LAB (OUTPATIENT)
Dept: LAB | Facility: CLINIC | Age: 63
End: 2023-07-07
Payer: COMMERCIAL

## 2023-07-07 DIAGNOSIS — D50.0 IRON DEFICIENCY ANEMIA DUE TO CHRONIC BLOOD LOSS: ICD-10-CM

## 2023-07-07 LAB
ERYTHROCYTE [DISTWIDTH] IN BLOOD BY AUTOMATED COUNT: 13.7 % (ref 10–15)
FERRITIN SERPL-MCNC: 24 NG/ML (ref 11–328)
HCT VFR BLD AUTO: 40.9 % (ref 35–47)
HGB BLD-MCNC: 12.7 G/DL (ref 11.7–15.7)
IRON BINDING CAPACITY (ROCHE): 334 UG/DL (ref 240–430)
IRON SATN MFR SERPL: 13 % (ref 15–46)
IRON SERPL-MCNC: 42 UG/DL (ref 37–145)
MCH RBC QN AUTO: 28.4 PG (ref 26.5–33)
MCHC RBC AUTO-ENTMCNC: 31.1 G/DL (ref 31.5–36.5)
MCV RBC AUTO: 92 FL (ref 78–100)
PLATELET # BLD AUTO: 325 10E3/UL (ref 150–450)
RBC # BLD AUTO: 4.47 10E6/UL (ref 3.8–5.2)
WBC # BLD AUTO: 10.2 10E3/UL (ref 4–11)

## 2023-07-07 PROCEDURE — 83540 ASSAY OF IRON: CPT

## 2023-07-07 PROCEDURE — 82728 ASSAY OF FERRITIN: CPT

## 2023-07-07 PROCEDURE — 36415 COLL VENOUS BLD VENIPUNCTURE: CPT

## 2023-07-07 PROCEDURE — 83550 IRON BINDING TEST: CPT

## 2023-07-07 PROCEDURE — 85027 COMPLETE CBC AUTOMATED: CPT

## 2023-07-11 ENCOUNTER — OFFICE VISIT (OUTPATIENT)
Dept: PEDIATRICS | Facility: CLINIC | Age: 63
End: 2023-07-11
Payer: COMMERCIAL

## 2023-07-11 VITALS
DIASTOLIC BLOOD PRESSURE: 66 MMHG | RESPIRATION RATE: 16 BRPM | WEIGHT: 262.8 LBS | BODY MASS INDEX: 43.78 KG/M2 | TEMPERATURE: 98 F | OXYGEN SATURATION: 97 % | HEIGHT: 65 IN | HEART RATE: 88 BPM | SYSTOLIC BLOOD PRESSURE: 126 MMHG

## 2023-07-11 DIAGNOSIS — M17.0 PRIMARY OSTEOARTHRITIS OF BOTH KNEES: ICD-10-CM

## 2023-07-11 DIAGNOSIS — K44.9 HIATAL HERNIA: ICD-10-CM

## 2023-07-11 DIAGNOSIS — F33.1 MODERATE EPISODE OF RECURRENT MAJOR DEPRESSIVE DISORDER (H): ICD-10-CM

## 2023-07-11 DIAGNOSIS — Z23 NEED FOR SHINGLES VACCINE: ICD-10-CM

## 2023-07-11 DIAGNOSIS — M05.9 RHEUMATOID ARTHRITIS WITH POSITIVE RHEUMATOID FACTOR, INVOLVING UNSPECIFIED SITE (H): ICD-10-CM

## 2023-07-11 DIAGNOSIS — Z87.19 HISTORY OF GI BLEED: ICD-10-CM

## 2023-07-11 DIAGNOSIS — R60.0 BILATERAL LOWER EXTREMITY EDEMA: Primary | ICD-10-CM

## 2023-07-11 DIAGNOSIS — Z23 NEED FOR TDAP VACCINATION: ICD-10-CM

## 2023-07-11 PROCEDURE — 90472 IMMUNIZATION ADMIN EACH ADD: CPT | Performed by: PEDIATRICS

## 2023-07-11 PROCEDURE — 90471 IMMUNIZATION ADMIN: CPT | Performed by: PEDIATRICS

## 2023-07-11 PROCEDURE — 90715 TDAP VACCINE 7 YRS/> IM: CPT | Performed by: PEDIATRICS

## 2023-07-11 PROCEDURE — 90750 HZV VACC RECOMBINANT IM: CPT | Performed by: PEDIATRICS

## 2023-07-11 PROCEDURE — 99215 OFFICE O/P EST HI 40 MIN: CPT | Mod: 25 | Performed by: PEDIATRICS

## 2023-07-11 RX ORDER — FUROSEMIDE 20 MG
20 TABLET ORAL DAILY
Qty: 14 TABLET | Refills: 0 | Status: SHIPPED | OUTPATIENT
Start: 2023-07-11 | End: 2023-07-25

## 2023-07-11 ASSESSMENT — PAIN SCALES - GENERAL: PAINLEVEL: MILD PAIN (3)

## 2023-07-11 NOTE — PATIENT INSTRUCTIONS
Restart compression stockings    Increase iron back to every other day and iron rich foods    Take lasix each morning for 7 days - let me know if swelling isn't getting better.  High potassium foods while doing this.    Keep on current citalopram dose    Cortisone today!    Plan a vacation!  [place] in January sounds great.    Renew your [facility] membership and water walk    Keep me posted about your stomach and if you notice anything new.   Think about pepcid (famotidine) if you are having more symptoms

## 2023-07-11 NOTE — PROGRESS NOTES
"  Assessment & Plan       ICD-10-CM    1. Bilateral lower extremity edema  R60.0 furosemide (LASIX) 20 MG tablet    Plan short course of lasix to help with LE edema - patient will resume use of compression stockings and elevate feet as able.  To alert me if not improving      2. History of GI bleed  Z87.19 CBC with platelets     Ferritin     Iron and iron binding capacity    Erosions in hiatal hernia.  Declines PPI - discussed use of famotidine.  Continue iron supplementation and closely following labs.    Recommend surgical consult in the future - patient not yet ready to pursue at this time. Last EGD 2021.  Patient to alert me with any acute changes      3. Moderate episode of recurrent major depressive disorder (H)  F33.1 Doing better with higher dose citalopram - continue      4. Primary osteoarthritis of both knees  M17.0 Has appt later today with orthopedics      5. Hiatal hernia  K44.9 CBC with platelets     Ferritin     Iron and iron binding capacity  See above      6. Rheumatoid arthritis with positive rheumatoid factor, involving unspecified site (H)  M05.9       7. BMI 40.0-44.9, adult (H)  Z68.41 Comprehensive metabolic panel (BMP + Alb, Alk Phos, ALT, AST, Total. Bili, TP)     Lipid panel reflex to direct LDL Fasting    Complicates management of OA - encouraged in her plan to start water walking at [facility].  Labs with phsyical      8. Need for Tdap vaccination  Z23 TDAP      9. Need for shingles vaccine  Z23 ZOSTER RECOMBINANT ADJUVANTED (SHINGRIX)            I spent a total of 60 minutes on the day of the visit.   Time spent by me doing chart review, history and exam, documentation and further activities per the note       BMI:   Estimated body mass index is 43.73 kg/m  as calculated from the following:    Height as of this encounter: 1.651 m (5' 5\").    Weight as of this encounter: 119.2 kg (262 lb 12.8 oz).   Weight management plan: Discussed healthy diet and exercise guidelines    See Patient " Instructions    Bing Fung MD  [facility] BRENDA Coronel is a 63 year old, presenting for the following health issues:  RECHECK        7/11/2023     1:14 PM   Additional Questions   Roomed by cyril   Accompanied by jillian         7/11/2023     1:14 PM   Patient Reported Additional Medications   Patient reports taking the following new medications jillian     History of Present Illness       Reason for visit:  Follow up for Anemia, knee pain, edema    She eats 0-1 servings of fruits and vegetables daily.She consumes 2 sweetened beverage(s) daily.She exercises with enough effort to increase her heart rate 9 or less minutes per day.  She exercises with enough effort to increase her heart rate 3 or less days per week.   She is taking medications regularly.       Mood - increased citalopram at last visit to 20mg and has found this helpful.  Very difficult year - just lost both parents - had services yesterday.   Still working to complete estate and go through all the things related to her parents' passing.  Very busy time.   Best friend recently diagnosed with colon cancer - plans to visit her next week.    Knee pain - has had synvisc injection since our last visit - 2nd synvisc infection - helps about 50%.  Also has cortisone shot this afternoon.  Also struggling with bursitis - worst on the left.   Working with orthopedics.  Has been taking some celebrex at night for pain and it is helpful.       Edema - both feet - started last week.  No long car rides.  Symptoms are symmetric.  Has had this happen intermittently in the past and responds well to a short course of lasix.  Has compression stockings at home that she plans to start wearing again.    Anemia - hx of erosive gastritis involving hiatal hernia.  Notices when hiatal hernia is up and feels a clenching in stomach.  Declines PPI due to concern about side effects.  Taking iron supplement every third day - will move to every other day  "based on recent labs with iron stores being lower than hoped.     RA - wondering about the possibility of psoriatic arthritis.  Discussed getting rheumatology involved again - patient considering.            Objective    /66   Pulse 88   Temp 98  F (36.7  C) (Tympanic)   Resp 16   Ht 1.651 m (5' 5\")   Wt 119.2 kg (262 lb 12.8 oz)   SpO2 97%   BMI 43.73 kg/m    Body mass index is 43.73 kg/m .   Wt Readings from Last 4 Encounters:   07/11/23 119.2 kg (262 lb 12.8 oz)   03/07/23 114.9 kg (253 lb 3.2 oz)   11/01/22 112.9 kg (248 lb 14.4 oz)   06/27/22 112.2 kg (247 lb 4.8 oz)       Physical Exam   GENERAL: healthy, alert and no distress  CV: regular rates and rhythm, normal S1 S2, no S3 or S4, no murmur, click or rub, peripheral pulses strong and 1+ bilateral lower extremity pitting edema to mid shin    ABDOMEN: soft, no rebound or guarding, mild tenderness substernally with deep   PSYCH: mentation appears normal, affect normal/bright    Labs reviewed                "

## 2023-07-18 ENCOUNTER — TRANSFERRED RECORDS (OUTPATIENT)
Dept: HEALTH INFORMATION MANAGEMENT | Facility: CLINIC | Age: 63
End: 2023-07-18
Payer: COMMERCIAL

## 2023-07-25 ENCOUNTER — MYC REFILL (OUTPATIENT)
Dept: PEDIATRICS | Facility: CLINIC | Age: 63
End: 2023-07-25
Payer: COMMERCIAL

## 2023-07-25 DIAGNOSIS — R60.0 BILATERAL LOWER EXTREMITY EDEMA: ICD-10-CM

## 2023-07-25 RX ORDER — FUROSEMIDE 20 MG
20 TABLET ORAL DAILY
Qty: 14 TABLET | Refills: 0 | Status: SHIPPED | OUTPATIENT
Start: 2023-07-25 | End: 2024-05-09

## 2023-07-25 NOTE — TELEPHONE ENCOUNTER
Patient wanting to take script for her vacation to [place] this Friday.   Worked well for her edema in the past.

## 2023-08-15 ENCOUNTER — PATIENT OUTREACH (OUTPATIENT)
Dept: CARE COORDINATION | Facility: CLINIC | Age: 63
End: 2023-08-15
Payer: COMMERCIAL

## 2023-09-12 ENCOUNTER — PATIENT OUTREACH (OUTPATIENT)
Dept: CARE COORDINATION | Facility: CLINIC | Age: 63
End: 2023-09-12
Payer: COMMERCIAL

## 2023-10-24 ENCOUNTER — TRANSFERRED RECORDS (OUTPATIENT)
Dept: HEALTH INFORMATION MANAGEMENT | Facility: CLINIC | Age: 63
End: 2023-10-24
Payer: COMMERCIAL

## 2023-11-06 ENCOUNTER — LAB (OUTPATIENT)
Dept: LAB | Facility: CLINIC | Age: 63
End: 2023-11-06
Payer: COMMERCIAL

## 2023-11-06 DIAGNOSIS — K44.9 HIATAL HERNIA: ICD-10-CM

## 2023-11-06 DIAGNOSIS — Z87.19 HISTORY OF GI BLEED: ICD-10-CM

## 2023-11-06 LAB
ERYTHROCYTE [DISTWIDTH] IN BLOOD BY AUTOMATED COUNT: 15.5 % (ref 10–15)
HCT VFR BLD AUTO: 40.5 % (ref 35–47)
HGB BLD-MCNC: 11.9 G/DL (ref 11.7–15.7)
MCH RBC QN AUTO: 25.2 PG (ref 26.5–33)
MCHC RBC AUTO-ENTMCNC: 29.4 G/DL (ref 31.5–36.5)
MCV RBC AUTO: 86 FL (ref 78–100)
PLATELET # BLD AUTO: 350 10E3/UL (ref 150–450)
RBC # BLD AUTO: 4.73 10E6/UL (ref 3.8–5.2)
WBC # BLD AUTO: 11.4 10E3/UL (ref 4–11)

## 2023-11-06 PROCEDURE — 82728 ASSAY OF FERRITIN: CPT

## 2023-11-06 PROCEDURE — 36415 COLL VENOUS BLD VENIPUNCTURE: CPT

## 2023-11-06 PROCEDURE — 80061 LIPID PANEL: CPT

## 2023-11-06 PROCEDURE — 80053 COMPREHEN METABOLIC PANEL: CPT

## 2023-11-06 PROCEDURE — 85027 COMPLETE CBC AUTOMATED: CPT

## 2023-11-06 PROCEDURE — 83540 ASSAY OF IRON: CPT

## 2023-11-06 PROCEDURE — 83550 IRON BINDING TEST: CPT

## 2023-11-07 LAB
ALBUMIN SERPL BCG-MCNC: 3.9 G/DL (ref 3.5–5.2)
ALP SERPL-CCNC: 107 U/L (ref 35–104)
ALT SERPL W P-5'-P-CCNC: 17 U/L (ref 0–50)
ANION GAP SERPL CALCULATED.3IONS-SCNC: 12 MMOL/L (ref 7–15)
AST SERPL W P-5'-P-CCNC: 19 U/L (ref 0–45)
BILIRUB SERPL-MCNC: 0.4 MG/DL
BUN SERPL-MCNC: 14.5 MG/DL (ref 8–23)
CALCIUM SERPL-MCNC: 9.4 MG/DL (ref 8.8–10.2)
CHLORIDE SERPL-SCNC: 101 MMOL/L (ref 98–107)
CHOLEST SERPL-MCNC: 223 MG/DL
CREAT SERPL-MCNC: 0.77 MG/DL (ref 0.51–0.95)
DEPRECATED HCO3 PLAS-SCNC: 27 MMOL/L (ref 22–29)
EGFRCR SERPLBLD CKD-EPI 2021: 86 ML/MIN/1.73M2
FERRITIN SERPL-MCNC: 20 NG/ML (ref 11–328)
GLUCOSE SERPL-MCNC: 111 MG/DL (ref 70–99)
HDLC SERPL-MCNC: 87 MG/DL
IRON BINDING CAPACITY (ROCHE): 326 UG/DL (ref 240–430)
IRON SATN MFR SERPL: 9 % (ref 15–46)
IRON SERPL-MCNC: 30 UG/DL (ref 37–145)
LDLC SERPL CALC-MCNC: 114 MG/DL
NONHDLC SERPL-MCNC: 136 MG/DL
POTASSIUM SERPL-SCNC: 4.1 MMOL/L (ref 3.4–5.3)
PROT SERPL-MCNC: 6.7 G/DL (ref 6.4–8.3)
SODIUM SERPL-SCNC: 140 MMOL/L (ref 135–145)
TRIGL SERPL-MCNC: 109 MG/DL

## 2023-11-13 ENCOUNTER — OFFICE VISIT (OUTPATIENT)
Dept: PEDIATRICS | Facility: CLINIC | Age: 63
End: 2023-11-13
Payer: COMMERCIAL

## 2023-11-13 VITALS
DIASTOLIC BLOOD PRESSURE: 78 MMHG | WEIGHT: 263 LBS | HEART RATE: 90 BPM | OXYGEN SATURATION: 96 % | BODY MASS INDEX: 43.82 KG/M2 | TEMPERATURE: 98.2 F | RESPIRATION RATE: 16 BRPM | SYSTOLIC BLOOD PRESSURE: 120 MMHG | HEIGHT: 65 IN

## 2023-11-13 DIAGNOSIS — M05.9 RHEUMATOID ARTHRITIS WITH POSITIVE RHEUMATOID FACTOR, INVOLVING UNSPECIFIED SITE (H): ICD-10-CM

## 2023-11-13 DIAGNOSIS — Z12.31 VISIT FOR SCREENING MAMMOGRAM: ICD-10-CM

## 2023-11-13 DIAGNOSIS — Z00.00 ROUTINE HISTORY AND PHYSICAL EXAMINATION OF ADULT: Primary | ICD-10-CM

## 2023-11-13 DIAGNOSIS — R60.0 LOWER EXTREMITY EDEMA: ICD-10-CM

## 2023-11-13 DIAGNOSIS — F33.1 MAJOR DEPRESSIVE DISORDER, RECURRENT EPISODE, MODERATE (H): ICD-10-CM

## 2023-11-13 DIAGNOSIS — Z87.19 HISTORY OF GI BLEED: ICD-10-CM

## 2023-11-13 DIAGNOSIS — G47.30 SLEEP APNEA, UNSPECIFIED TYPE: ICD-10-CM

## 2023-11-13 DIAGNOSIS — M17.0 PRIMARY OSTEOARTHRITIS OF BOTH KNEES: ICD-10-CM

## 2023-11-13 DIAGNOSIS — D50.0 IRON DEFICIENCY ANEMIA DUE TO CHRONIC BLOOD LOSS: ICD-10-CM

## 2023-11-13 DIAGNOSIS — K44.9 HIATAL HERNIA: ICD-10-CM

## 2023-11-13 PROCEDURE — 99396 PREV VISIT EST AGE 40-64: CPT | Mod: 25 | Performed by: PEDIATRICS

## 2023-11-13 PROCEDURE — 91320 SARSCV2 VAC 30MCG TRS-SUC IM: CPT | Performed by: PEDIATRICS

## 2023-11-13 PROCEDURE — 90471 IMMUNIZATION ADMIN: CPT | Performed by: PEDIATRICS

## 2023-11-13 PROCEDURE — 90480 ADMN SARSCOV2 VAC 1/ONLY CMP: CPT | Performed by: PEDIATRICS

## 2023-11-13 PROCEDURE — 90686 IIV4 VACC NO PRSV 0.5 ML IM: CPT | Performed by: PEDIATRICS

## 2023-11-13 ASSESSMENT — ENCOUNTER SYMPTOMS
CHILLS: 0
DYSURIA: 0
NERVOUS/ANXIOUS: 0
HEARTBURN: 0
ARTHRALGIAS: 1
JOINT SWELLING: 1
EYE PAIN: 0
HEMATOCHEZIA: 1
DIZZINESS: 0
FREQUENCY: 0
MYALGIAS: 0
HEMATURIA: 0
DIARRHEA: 0
ABDOMINAL PAIN: 0
CONSTIPATION: 0
NAUSEA: 0
PARESTHESIAS: 1
COUGH: 0
BREAST MASS: 0
HEADACHES: 0
PALPITATIONS: 0
WEAKNESS: 0
SORE THROAT: 0
SHORTNESS OF BREATH: 0
FEVER: 0

## 2023-11-13 ASSESSMENT — PATIENT HEALTH QUESTIONNAIRE - PHQ9
SUM OF ALL RESPONSES TO PHQ QUESTIONS 1-9: 5
SUM OF ALL RESPONSES TO PHQ QUESTIONS 1-9: 5
10. IF YOU CHECKED OFF ANY PROBLEMS, HOW DIFFICULT HAVE THESE PROBLEMS MADE IT FOR YOU TO DO YOUR WORK, TAKE CARE OF THINGS AT HOME, OR GET ALONG WITH OTHER PEOPLE: NOT DIFFICULT AT ALL

## 2023-11-13 ASSESSMENT — PAIN SCALES - GENERAL: PAINLEVEL: NO PAIN (0)

## 2023-11-13 NOTE — PATIENT INSTRUCTIONS
Change iron to every day    Think about iron fish    [place]ler if you are noticing more blood loss    Come back for visit in 4 months with labs before    Flu and COVID today    Lasix - a few days    Message me about your lymph nodes if not better by December    Think about visit with Dr Hall    Mammogram - we can help you schedule      Preventive Health Recommendations  Female Ages 50 - 64    Yearly exam: See your health care provider every year in order to  Review health changes.   Discuss preventive care.    Review your medicines if your doctor has prescribed any.    Get a Pap test every three years (unless you have an abnormal result and your provider advises testing more often).  If you get Pap tests with HPV test, you only need to test every 5 years, unless you have an abnormal result.   You do not need a Pap test if your uterus was removed (hysterectomy) and you have not had cancer.  You should be tested each year for STDs (sexually transmitted diseases) if you're at risk.   Have a mammogram every 1 to 2 years.  Have a colonoscopy at age 45, or have a yearly FIT test (stool test). These exams screen for colon cancer.    Have a cholesterol test every 5 years, or more often if advised.  Have a diabetes test (fasting glucose) every three years. If you are at risk for diabetes, you should have this test more often.   If you are at risk for osteoporosis (brittle bone disease), think about having a bone density scan (DEXA).    Shots: Get a flu shot each year. Get a tetanus shot every 10 years.    Nutrition:   Eat at least 5 servings of fruits and vegetables each day.  Eat whole-grain bread, whole-wheat pasta and brown rice instead of white grains and rice.  Get adequate Calcium and Vitamin D.     Lifestyle  Exercise at least 150 minutes a week (30 minutes a day, 5 days a week). This will help you control your weight and prevent disease.  Limit alcohol to one drink per day.  No smoking.   Wear sunscreen to prevent  skin cancer.   See your dentist every six months for an exam and cleaning.  See your eye doctor every 1 to 2 years.

## 2023-11-13 NOTE — PROGRESS NOTES
SUBJECTIVE:   CC: Berna is an 63 year old who presents for preventive health visit.       11/13/2023     3:58 PM   Additional Questions   Roomed by Gila MARTIN   Accompanied by Self         11/13/2023     3:58 PM   Patient Reported Additional Medications   Patient reports taking the following new medications n/a       Healthy Habits:     Getting at least 3 servings of Calcium per day:  NO    Bi-annual eye exam:  NO    Dental care twice a year:  Yes    Sleep apnea or symptoms of sleep apnea:  Excessive snoring    Diet:  Regular (no restrictions)    Frequency of exercise:  None    Taking medications regularly:  Yes    Medication side effects:  None    Additional concerns today:  Yes      Today's PHQ-9 Score:       11/13/2023     3:48 PM   PHQ-9 SCORE   PHQ-9 Total Score MyChart 5 (Mild depression)   PHQ-9 Total Score 5             Social History     Tobacco Use    Smoking status: Never     Passive exposure: Yes    Smokeless tobacco: Never    Tobacco comments:     Live with a smoker   Substance Use Topics    Alcohol use: Yes     Comment: occasional , social           11/13/2023     3:52 PM   Alcohol Use   Prescreen: >3 drinks/day or >7 drinks/week? No     Reviewed orders with patient.  Reviewed health maintenance and updated orders accordingly - Yes  Labs reviewed in EPIC    Breast Cancer Screening:    FHS-7:       9/14/2021     2:13 PM   Breast CA Risk Assessment (FHS-7)   Did any of your first-degree relatives have breast or ovarian cancer? No   Did any of your relatives have bilateral breast cancer? No   Did any man in your family have breast cancer? No   Did any woman in your family have breast and ovarian cancer? No   Did any woman in your family have breast cancer before age 50 y? No   Do you have 2 or more relatives with breast and/or ovarian cancer? No   Do you have 2 or more relatives with breast and/or bowel cancer? No       Mammogram Screening: Recommended mammography every 1-2 years with patient  discussion and risk factor consideration  Pertinent mammograms are reviewed under the imaging tab.    History of abnormal Pap smear: Status post benign hysterectomy. Health Maintenance and Surgical History updated.     Reviewed and updated as needed this visit by clinical staff   Tobacco  Allergies  Meds              Reviewed and updated as needed this visit by Provider               Answers submitted by the patient for this visit:  Patient Health Questionnaire (Submitted on 11/13/2023)  If you checked off any problems, how difficult have these problems made it for you to do your work, take care of things at home, or get along with other people?: Not difficult at all  PHQ9 TOTAL SCORE: 5      Hx of GI bleed - erosive ulcers in hiatal hernia - has had some blood in stool recently.   Concerned about side effects of PPIs and prefers not to take.   Hasn't had abdominal pain like what she experienced with last significant GI bleed.    RA and OA - follows with rheumatology and orthopedics.  Cortisone injection in both knees last month and has Synvisc next month.   Now off celebrex.  Working to do everything possible to avoid knee replacement surgery    Depression - symptoms controlled adequately on current celexa dosing    ? Sleep apnea - highly suspects that she has it, but couldn't tolerate any sort of face mask or oral appliance - not interested in working up at this time    Visit to [place] since our last visit, during which she got COVID and was in [facility] at the same time as the [place]!  Recovered well from Covid - got Paxlovid from Urgent Care in [place].    Has added Provitalize with tumeric - changed 1 month ago - OA in knees are better    Collagen supplement - added    Visited friend in [place] - going through [facility] tx    Leg edema - intermittent - didn't struggle with it during her [place] trip, but last few days, legs have been more swollen.  Still has a few lasix pills at home to use.          Review  "of Systems   Constitutional:  Negative for chills and fever.   HENT:  Negative for congestion, ear pain, hearing loss and sore throat.    Eyes:  Negative for pain and visual disturbance.   Respiratory:  Negative for cough and shortness of breath.    Cardiovascular:  Positive for peripheral edema. Negative for chest pain and palpitations.   Gastrointestinal:  Positive for hematochezia. Negative for abdominal pain, constipation, diarrhea, heartburn and nausea.   Breasts:  Negative for tenderness, breast mass and discharge.   Genitourinary:  Negative for dysuria, frequency, genital sores, hematuria, pelvic pain, urgency, vaginal bleeding and vaginal discharge.   Musculoskeletal:  Positive for arthralgias and joint swelling. Negative for myalgias.   Skin:  Negative for rash.   Neurological:  Positive for paresthesias. Negative for dizziness, weakness and headaches.   Psychiatric/Behavioral:  Negative for mood changes. The patient is not nervous/anxious.           OBJECTIVE:   /78   Pulse 90   Temp 98.2  F (36.8  C) (Tympanic)   Resp 16   Ht 1.645 m (5' 4.76\")   Wt 119.3 kg (263 lb)   SpO2 96%   BMI 44.09 kg/m    Wt Readings from Last 4 Encounters:   11/13/23 119.3 kg (263 lb)   07/11/23 119.2 kg (262 lb 12.8 oz)   03/07/23 114.9 kg (253 lb 3.2 oz)   11/01/22 112.9 kg (248 lb 14.4 oz)       Physical Exam  GENERAL: healthy, alert and no distress  EYES: Eyes grossly normal to inspection, PERRL and conjunctivae and sclerae normal  HENT: ear canals and TM's normal, nose and mouth without ulcers or lesions  NECK: bilateral shotty anterior cervical LAD, no asymmetry, masses, or scars and thyroid normal to palpation  RESP: lungs clear to auscultation - no rales, rhonchi or wheezes  CV: regular rates and rhythm, normal S1 S2, no S3 or S4, no murmur, click or rub, peripheral pulses strong, and 1+ bilateral lower extremity pitting edema to mid shin    ABDOMEN: soft, nontender, no hepatosplenomegaly, no masses and " bowel sounds normal  MS: antalgic gait, bilateral knee swelling, limited ROM  SKIN: no suspicious lesions or rashes  NEURO: Normal strength and tone, mentation intact and speech normal  PSYCH: mentation appears normal, affect normal/bright    Diagnostic Test Results:  Labs reviewed in Epic    ASSESSMENT/PLAN:       ICD-10-CM    1. Routine history and physical examination of adult  Z00.00 MA Screen Bilateral w/Leonardo     COVID-19 12+ (2023-24) (PFIZER)     PRIMARY CARE FOLLOW-UP SCHEDULING     INFLUENZA VACCINE >6 MONTHS (AFLURIA/FLUZONE)      2. History of GI bleed  Z87.19 **Iron and iron binding capacity FUTURE 2mo     CBC with platelets and differential     Ferritin    Plan to increase iron dosing to once daily and recheck labs in 3-4 months    Patient not interested in further work up at this point, but promises to alert me if she notices more blood in stool.  Happy to order repeat endoscopies and to discuss surgical intervention for hiatal hernia at any point - will review each visit.        3. Rheumatoid arthritis with positive rheumatoid factor, involving unspecified site (H)  M05.9 Recommended follow up with Dr Hall      4. Hiatal hernia  K44.9 See above      5. Iron deficiency anemia due to chronic blood loss  D50.0 **Iron and iron binding capacity FUTURE 2mo     CBC with platelets and differential     Ferritin    See above      6. Sleep apnea, unspecified type  G47.30 Declines additional work up at this time due to inability to tolerate mask/anything on face - will continue to revisit      7. Major depressive disorder, recurrent episode, moderate (H)  F33.1 Well controlled, continue current medications        8. Primary osteoarthritis of both knees  M17.0 Following with orthopedics      9. Visit for screening mammogram  Z12.31 MA Screen Bilateral w/Leonardo      10. BMI 40.0-44.9, adult (H)  Z68.41 Complicating management of arthritis      11. Lower extremity edema  R60.0 Plan trial of a few days of lasix,  restart compression stockings - to alert me if not improving.            COUNSELING:  Reviewed preventive health counseling, as reflected in patient instructions        She reports that she has never smoked. She has been exposed to tobacco smoke. She has never used smokeless tobacco.        Bing Fung MD  [facility]

## 2023-11-21 ENCOUNTER — TRANSFERRED RECORDS (OUTPATIENT)
Dept: HEALTH INFORMATION MANAGEMENT | Facility: CLINIC | Age: 63
End: 2023-11-21
Payer: COMMERCIAL

## 2023-11-28 ENCOUNTER — ANCILLARY PROCEDURE (OUTPATIENT)
Dept: MAMMOGRAPHY | Facility: CLINIC | Age: 63
End: 2023-11-28
Attending: PEDIATRICS
Payer: COMMERCIAL

## 2023-11-28 DIAGNOSIS — Z00.00 ROUTINE HISTORY AND PHYSICAL EXAMINATION OF ADULT: ICD-10-CM

## 2023-11-28 DIAGNOSIS — Z12.31 VISIT FOR SCREENING MAMMOGRAM: ICD-10-CM

## 2023-11-28 PROCEDURE — 77063 BREAST TOMOSYNTHESIS BI: CPT | Mod: TC | Performed by: RADIOLOGY

## 2023-11-28 PROCEDURE — 77067 SCR MAMMO BI INCL CAD: CPT | Mod: TC | Performed by: RADIOLOGY

## 2024-01-02 ENCOUNTER — HOSPITAL ENCOUNTER (OUTPATIENT)
Dept: ULTRASOUND IMAGING | Facility: CLINIC | Age: 64
Discharge: HOME | End: 2024-01-02
Attending: PHYSICIAN ASSISTANT | Admitting: PHYSICIAN ASSISTANT
Payer: COMMERCIAL

## 2024-01-02 ENCOUNTER — MYC MEDICAL ADVICE (OUTPATIENT)
Dept: PEDIATRICS | Facility: CLINIC | Age: 64
End: 2024-01-02
Payer: COMMERCIAL

## 2024-01-02 ENCOUNTER — OFFICE VISIT (OUTPATIENT)
Dept: PEDIATRICS | Facility: CLINIC | Age: 64
End: 2024-01-02
Payer: COMMERCIAL

## 2024-01-02 VITALS
SYSTOLIC BLOOD PRESSURE: 128 MMHG | TEMPERATURE: 98.7 F | HEART RATE: 85 BPM | RESPIRATION RATE: 18 BRPM | OXYGEN SATURATION: 95 % | WEIGHT: 263 LBS | BODY MASS INDEX: 44.09 KG/M2 | DIASTOLIC BLOOD PRESSURE: 84 MMHG

## 2024-01-02 DIAGNOSIS — M79.89 REDNESS AND SWELLING OF LOWER LEG: ICD-10-CM

## 2024-01-02 DIAGNOSIS — R60.0 EDEMA OF BOTH LOWER EXTREMITIES: ICD-10-CM

## 2024-01-02 DIAGNOSIS — R23.8 REDNESS AND SWELLING OF LOWER LEG: ICD-10-CM

## 2024-01-02 DIAGNOSIS — R60.0 EDEMA OF BOTH LOWER EXTREMITIES: Primary | ICD-10-CM

## 2024-01-02 DIAGNOSIS — L03.116 BILATERAL LOWER LEG CELLULITIS: Primary | ICD-10-CM

## 2024-01-02 DIAGNOSIS — L03.115 BILATERAL LOWER LEG CELLULITIS: Primary | ICD-10-CM

## 2024-01-02 LAB
ANION GAP SERPL CALCULATED.3IONS-SCNC: 11 MMOL/L (ref 7–15)
BASOPHILS # BLD AUTO: 0.1 10E3/UL (ref 0–0.2)
BASOPHILS NFR BLD AUTO: 1 %
BUN SERPL-MCNC: 17.6 MG/DL (ref 8–23)
CALCIUM SERPL-MCNC: 9.3 MG/DL (ref 8.8–10.2)
CHLORIDE SERPL-SCNC: 99 MMOL/L (ref 98–107)
CREAT SERPL-MCNC: 0.78 MG/DL (ref 0.51–0.95)
DEPRECATED HCO3 PLAS-SCNC: 30 MMOL/L (ref 22–29)
EGFRCR SERPLBLD CKD-EPI 2021: 84 ML/MIN/1.73M2
EOSINOPHIL # BLD AUTO: 0.4 10E3/UL (ref 0–0.7)
EOSINOPHIL NFR BLD AUTO: 3 %
ERYTHROCYTE [DISTWIDTH] IN BLOOD BY AUTOMATED COUNT: 17.7 % (ref 10–15)
ERYTHROCYTE [SEDIMENTATION RATE] IN BLOOD BY WESTERGREN METHOD: 15 MM/HR (ref 0–30)
GLUCOSE SERPL-MCNC: 94 MG/DL (ref 70–99)
HCT VFR BLD AUTO: 42.4 % (ref 35–47)
HGB BLD-MCNC: 13.2 G/DL (ref 11.7–15.7)
IMM GRANULOCYTES # BLD: 0 10E3/UL
IMM GRANULOCYTES NFR BLD: 0 %
LYMPHOCYTES # BLD AUTO: 2.9 10E3/UL (ref 0.8–5.3)
LYMPHOCYTES NFR BLD AUTO: 26 %
MCH RBC QN AUTO: 26.2 PG (ref 26.5–33)
MCHC RBC AUTO-ENTMCNC: 31.1 G/DL (ref 31.5–36.5)
MCV RBC AUTO: 84 FL (ref 78–100)
MONOCYTES # BLD AUTO: 1.1 10E3/UL (ref 0–1.3)
MONOCYTES NFR BLD AUTO: 10 %
NEUTROPHILS # BLD AUTO: 6.6 10E3/UL (ref 1.6–8.3)
NEUTROPHILS NFR BLD AUTO: 60 %
NRBC # BLD AUTO: 0 10E3/UL
NRBC BLD AUTO-RTO: 0 /100
NT-PROBNP SERPL-MCNC: 57 PG/ML (ref 0–900)
PLATELET # BLD AUTO: 337 10E3/UL (ref 150–450)
POTASSIUM SERPL-SCNC: 3.9 MMOL/L (ref 3.4–5.3)
RBC # BLD AUTO: 5.03 10E6/UL (ref 3.8–5.2)
SODIUM SERPL-SCNC: 140 MMOL/L (ref 135–145)
WBC # BLD AUTO: 11.1 10E3/UL (ref 4–11)

## 2024-01-02 PROCEDURE — 85652 RBC SED RATE AUTOMATED: CPT | Performed by: PHYSICIAN ASSISTANT

## 2024-01-02 PROCEDURE — 93970 EXTREMITY STUDY: CPT

## 2024-01-02 PROCEDURE — 99207 REFERRAL TO ACUTE AND DIAGNOSTIC SERVICES: CPT | Performed by: PEDIATRICS

## 2024-01-02 PROCEDURE — 83880 ASSAY OF NATRIURETIC PEPTIDE: CPT | Performed by: PHYSICIAN ASSISTANT

## 2024-01-02 PROCEDURE — 85025 COMPLETE CBC W/AUTO DIFF WBC: CPT | Performed by: PHYSICIAN ASSISTANT

## 2024-01-02 PROCEDURE — 80048 BASIC METABOLIC PNL TOTAL CA: CPT | Performed by: PHYSICIAN ASSISTANT

## 2024-01-02 PROCEDURE — 99214 OFFICE O/P EST MOD 30 MIN: CPT | Performed by: PHYSICIAN ASSISTANT

## 2024-01-02 PROCEDURE — 36415 COLL VENOUS BLD VENIPUNCTURE: CPT | Performed by: PHYSICIAN ASSISTANT

## 2024-01-02 RX ORDER — CEFDINIR 300 MG/1
300 CAPSULE ORAL 2 TIMES DAILY
Qty: 20 CAPSULE | Refills: 0 | Status: SHIPPED | OUTPATIENT
Start: 2024-01-02 | End: 2024-01-12

## 2024-01-02 RX ORDER — FUROSEMIDE 20 MG
20 TABLET ORAL DAILY
Qty: 3 TABLET | Refills: 0 | Status: SHIPPED | OUTPATIENT
Start: 2024-01-02 | End: 2024-05-09

## 2024-01-02 NOTE — PROGRESS NOTES
Assessment & Plan     Edema of both lower extremities    Patient has bilateral leg edema and has a hx of this  She was on hydrochlorothiazide in the last but does not take it any longer  Her legs are erythematous with mild warmth and tenderness  There is concern for cellulitis and/or DVT    Leg ultrasound is NEG for DVT    Start on lasix 20mg daily for 3 days  Leg elevation when possible  Use compression stocking immediately when getting up in the morning  It would be advised to have follow up with PCP in 1-2 weeks    BNP is normal, no signs of CHF  BMP shows normal renal function    - furosemide (LASIX) 20 MG tablet  Dispense: 3 tablet; Refill: 0    Redness and swelling of lower leg    Swelling in the legs, ankles, and feet is called edema. It is common after you sit or stand for a while. Long plane flights or car rides often cause swelling in the legs and feet. You may also have swelling if you have to stand for long periods of time at your job. Problems with the veins in the legs (varicose veins) and changes in hormones can also cause swelling. Sometimes the swelling in the ankles and feet is caused by a more serious problem, such as heart failure, infection, blood clots, or liver or kidney disease.  Follow-up care is a key part of your treatment and safety. Be sure to make and go to all appointments, and call your doctor if you are having problems. It's also a good idea to know your test results and keep a list of the medicines you take.    How can you care for yourself at home?    Whenever you are resting, raise your legs up. Try to keep the swollen area higher than the level of your heart.  Take breaks from standing or sitting in one position.  Walk around to increase the blood flow in your lower legs.  Move your feet and ankles often while you stand, or tighten and relax your leg muscles.  Wear support stockings. Put them on in the morning, before swelling gets worse.  Eat a balanced diet. Lose weight if  "you need to.  Limit the amount of salt (sodium) in your diet. Salt holds fluid in the body and may increase swelling.    - furosemide (LASIX) 20 MG tablet  Dispense: 3 tablet; Refill: 0      Bilateral lower leg cellulitis    CBC is mildly elevated  There is concern for cellulitis    Cellulitis is an infection of the deep layers of skin. A break in the skin, such as a cut or scratch, can let bacteria under the skin. If the bacteria get to deep layers of the skin, it can be serious. If not treated, cellulitis can get into the bloodstream and lymph nodes. The infection can then spread throughout the body. This causes serious illness.   Cellulitis causes the affected skin to become red, swollen, warm, and sore. The reddened areas have a visible border. An open sore may leak fluid (pus). You may have a fever, chills, and pain.   Cellulitis is treated with antibiotics taken for 7 to 10 days. An open sore may be cleaned and covered with cool wet gauze. Symptoms should get better 1 to 2 days after treatment is started. Make sure to take all the antibiotics for the full number of days until they are gone. Keep taking the medicine even if your symptoms go away.     - cefdinir (OMNICEF) 300 MG capsule  Dispense: 20 capsule; Refill: 0      Review of external notes as documented elsewhere in note         BMI:   Estimated body mass index is 44.09 kg/m  as calculated from the following:    Height as of 11/13/23: 1.645 m (5' 4.76\").    Weight as of this encounter: 119.3 kg (263 lb).       Advised to have follow up with PCP in 2 weeks for recheck    No follow-ups on file.    Rg Pizarro, [facility], PA-C  [facility]    Kelly Coronel is a 63 year old, presenting for the following health issues:  Edema (Bilateral feet/ankle swelling, pain )      HPI     Evaluation for possible DVT  Onset/Duration: X 2 weeks  Description:       Location: bilateral lower extremities       Redness: YES       Pain: 3/10       " Warmth: YES       Joint swelling YES- bilateral ankles/knees  Progression of symptoms worse  Accompanying signs and symptoms:       Fevers: no        Numbness/tingling/weakness: YES       Chest pain/pleurisy: no        Shortness of breath: no   History        Trauma: no         Recent travel/when: no         Previous history of DVT: no         Family history of DVT: YES- father, mother, suspected DVT        Recent surgery: no   Aggravating factors include: sitting, standing, walking, and climbing stairs  Therapies tried and outcome: Lasix PRN, taking X 3 days 20 mg daily, no change in swelling  Prior surgery on arteries of veins in this area: No          Review of Systems   Constitutional, HEENT, cardiovascular, pulmonary, GI, , musculoskeletal, neuro, skin, endocrine and psych systems are negative, except as otherwise noted.      Objective    There were no vitals taken for this visit.  There is no height or weight on file to calculate BMI.  Physical Exam   GENERAL: healthy, alert and no distress  RESP: lungs clear to auscultation - no rales, rhonchi or wheezes  CV: regular rate and rhythm, normal S1 S2, no S3 or S4, no murmur, click or rub, no peripheral edema and peripheral pulses strong  ABDOMEN: soft, nontender, no hepatosplenomegaly, no masses and bowel sounds normal  MS: Pos for lower leg swelling bilaterally  SKIN: pos for erythema and tenderness of skin in lower legs bilaterally from knee to toes  NEURO: Normal strength and tone, mentation intact and speech normal  PSYCH: mentation appears normal, affect normal/bright        Results for orders placed or performed during the hospital encounter of 01/02/24   US Lower Extremity Venous Duplex Bilateral     Status: None    Narrative    VENOUS ULTRASOUND BILATERAL LOWER EXTREMITIES  1/2/2024 3:58 PM     HISTORY: Edema of both lower extremities. Redness and swelling of  lower leg.    COMPARISON: November 3, 2021    TECHNIQUE: Color Doppler and spectral waveform  analysis performed  throughout the deep veins of both lower extremities.    FINDINGS: Both common femoral, proximal great saphenous, femoral, and  popliteal veins demonstrate normal blood flow, compression, and  augmentation. Posterior tibial and peroneal veins are compressible.      Impression    IMPRESSION: Negative for deep venous thrombosis throughout both lower  extremities.    ELEN SANTOS MD         SYSTEM ID:  [id number]   Results for orders placed or performed in visit on 01/02/24   BNP-N terminal pro     Status: Normal   Result Value Ref Range    N Terminal Pro BNP Outpatient 57 0 - 900 pg/mL   Basic metabolic panel     Status: Abnormal   Result Value Ref Range    Sodium 140 135 - 145 mmol/L    Potassium 3.9 3.4 - 5.3 mmol/L    Chloride 99 98 - 107 mmol/L    Carbon Dioxide (CO2) 30 (H) 22 - 29 mmol/L    Anion Gap 11 7 - 15 mmol/L    Urea Nitrogen 17.6 8.0 - 23.0 mg/dL    Creatinine 0.78 0.51 - 0.95 mg/dL    GFR Estimate 84 >60 mL/min/1.73m2    Calcium 9.3 8.8 - 10.2 mg/dL    Glucose 94 70 - 99 mg/dL   Erythrocyte sedimentation rate auto     Status: Normal   Result Value Ref Range    Erythrocyte Sedimentation Rate 15 0 - 30 mm/hr   CBC with platelets and differential     Status: Abnormal   Result Value Ref Range    WBC Count 11.1 (H) 4.0 - 11.0 10e3/uL    RBC Count 5.03 3.80 - 5.20 10e6/uL    Hemoglobin 13.2 11.7 - 15.7 g/dL    Hematocrit 42.4 35.0 - 47.0 %    MCV 84 78 - 100 fL    MCH 26.2 (L) 26.5 - 33.0 pg    MCHC 31.1 (L) 31.5 - 36.5 g/dL    RDW 17.7 (H) 10.0 - 15.0 %    Platelet Count 337 150 - 450 10e3/uL    % Neutrophils 60 %    % Lymphocytes 26 %    % Monocytes 10 %    % Eosinophils 3 %    % Basophils 1 %    % Immature Granulocytes 0 %    NRBCs per 100 WBC 0 <1 /100    Absolute Neutrophils 6.6 1.6 - 8.3 10e3/uL    Absolute Lymphocytes 2.9 0.8 - 5.3 10e3/uL    Absolute Monocytes 1.1 0.0 - 1.3 10e3/uL    Absolute Eosinophils 0.4 0.0 - 0.7 10e3/uL    Absolute Basophils 0.1 0.0 - 0.2 10e3/uL     Absolute Immature Granulocytes 0.0 <=0.4 10e3/uL    Absolute NRBCs 0.0 10e3/uL   CBC with platelets differential     Status: Abnormal    Narrative    The following orders were created for panel order CBC with platelets differential.  Procedure                               Abnormality         Status                     ---------                               -----------         ------                     CBC with platelets and d...[[id number]]  Abnormal            Final result                 Please view results for these tests on the individual orders.

## 2024-01-02 NOTE — TELEPHONE ENCOUNTER
Huddled with Dr. Fung - Recommend to be seen today.     Updated patient. She is open to the [facility] if there are openings.     Called [facility]. They can take the patient right now.     Updated the patient. She agrees with plan. She will get ready to go now.

## 2024-01-02 NOTE — Clinical Note
Thank you for your referral to the [facility], Patient was found to be NEG for DVT Her leg swelling is worsened and is accompanied with mild cellulitis. She was placed on omnicef and 3 day course of lasix.  She is to use compression stockings and it would be advised to have a 2 week follow up to discuss continuation of lasix or other diuretics for her leg edema.  Thank you LIOR Rooney PA-C

## 2024-01-30 ENCOUNTER — TRANSFERRED RECORDS (OUTPATIENT)
Dept: HEALTH INFORMATION MANAGEMENT | Facility: CLINIC | Age: 64
End: 2024-01-30
Payer: COMMERCIAL

## 2024-02-13 ENCOUNTER — LAB (OUTPATIENT)
Dept: LAB | Facility: CLINIC | Age: 64
End: 2024-02-13
Payer: COMMERCIAL

## 2024-02-13 DIAGNOSIS — Z87.19 HISTORY OF GI BLEED: ICD-10-CM

## 2024-02-13 DIAGNOSIS — D50.0 IRON DEFICIENCY ANEMIA DUE TO CHRONIC BLOOD LOSS: ICD-10-CM

## 2024-02-13 LAB
BASOPHILS # BLD AUTO: 0.1 10E3/UL (ref 0–0.2)
BASOPHILS NFR BLD AUTO: 1 %
EOSINOPHIL # BLD AUTO: 0.3 10E3/UL (ref 0–0.7)
EOSINOPHIL NFR BLD AUTO: 3 %
ERYTHROCYTE [DISTWIDTH] IN BLOOD BY AUTOMATED COUNT: 17.2 % (ref 10–15)
HCT VFR BLD AUTO: 40 % (ref 35–47)
HGB BLD-MCNC: 11.9 G/DL (ref 11.7–15.7)
IMM GRANULOCYTES # BLD: 0 10E3/UL
IMM GRANULOCYTES NFR BLD: 0 %
LYMPHOCYTES # BLD AUTO: 2.8 10E3/UL (ref 0.8–5.3)
LYMPHOCYTES NFR BLD AUTO: 23 %
MCH RBC QN AUTO: 25.4 PG (ref 26.5–33)
MCHC RBC AUTO-ENTMCNC: 29.8 G/DL (ref 31.5–36.5)
MCV RBC AUTO: 86 FL (ref 78–100)
MONOCYTES # BLD AUTO: 0.9 10E3/UL (ref 0–1.3)
MONOCYTES NFR BLD AUTO: 8 %
NEUTROPHILS # BLD AUTO: 8 10E3/UL (ref 1.6–8.3)
NEUTROPHILS NFR BLD AUTO: 66 %
PLATELET # BLD AUTO: 340 10E3/UL (ref 150–450)
RBC # BLD AUTO: 4.68 10E6/UL (ref 3.8–5.2)
WBC # BLD AUTO: 12.1 10E3/UL (ref 4–11)

## 2024-02-13 PROCEDURE — 85025 COMPLETE CBC W/AUTO DIFF WBC: CPT

## 2024-02-13 PROCEDURE — 36415 COLL VENOUS BLD VENIPUNCTURE: CPT

## 2024-02-13 PROCEDURE — 83540 ASSAY OF IRON: CPT

## 2024-02-13 PROCEDURE — 82728 ASSAY OF FERRITIN: CPT

## 2024-02-13 PROCEDURE — 83550 IRON BINDING TEST: CPT

## 2024-02-14 LAB
FERRITIN SERPL-MCNC: 23 NG/ML (ref 11–328)
IRON BINDING CAPACITY (ROCHE): 329 UG/DL (ref 240–430)
IRON SATN MFR SERPL: 9 % (ref 15–46)
IRON SERPL-MCNC: 30 UG/DL (ref 37–145)

## 2024-02-20 ENCOUNTER — OFFICE VISIT (OUTPATIENT)
Dept: PEDIATRICS | Facility: CLINIC | Age: 64
End: 2024-02-20
Payer: COMMERCIAL

## 2024-02-20 VITALS
HEART RATE: 84 BPM | TEMPERATURE: 97.9 F | DIASTOLIC BLOOD PRESSURE: 76 MMHG | SYSTOLIC BLOOD PRESSURE: 116 MMHG | BODY MASS INDEX: 44.92 KG/M2 | WEIGHT: 269.6 LBS | OXYGEN SATURATION: 97 % | RESPIRATION RATE: 16 BRPM | HEIGHT: 65 IN

## 2024-02-20 DIAGNOSIS — M15.0 PRIMARY OSTEOARTHRITIS INVOLVING MULTIPLE JOINTS: ICD-10-CM

## 2024-02-20 DIAGNOSIS — Z87.19 HISTORY OF GI BLEED: ICD-10-CM

## 2024-02-20 DIAGNOSIS — K31.89 EROSIVE GASTROPATHY: ICD-10-CM

## 2024-02-20 DIAGNOSIS — D50.0 IRON DEFICIENCY ANEMIA DUE TO CHRONIC BLOOD LOSS: Primary | ICD-10-CM

## 2024-02-20 DIAGNOSIS — R60.0 BILATERAL LOWER EXTREMITY EDEMA: ICD-10-CM

## 2024-02-20 DIAGNOSIS — F33.1 MODERATE EPISODE OF RECURRENT MAJOR DEPRESSIVE DISORDER (H): ICD-10-CM

## 2024-02-20 DIAGNOSIS — Z23 NEED FOR SHINGLES VACCINE: ICD-10-CM

## 2024-02-20 DIAGNOSIS — E66.01 MORBID OBESITY (H): ICD-10-CM

## 2024-02-20 DIAGNOSIS — G47.30 SLEEP APNEA, UNSPECIFIED TYPE: ICD-10-CM

## 2024-02-20 DIAGNOSIS — F41.1 GAD (GENERALIZED ANXIETY DISORDER): ICD-10-CM

## 2024-02-20 DIAGNOSIS — M05.9 RHEUMATOID ARTHRITIS WITH POSITIVE RHEUMATOID FACTOR, INVOLVING UNSPECIFIED SITE (H): ICD-10-CM

## 2024-02-20 PROCEDURE — 90750 HZV VACC RECOMBINANT IM: CPT | Performed by: PEDIATRICS

## 2024-02-20 PROCEDURE — 90471 IMMUNIZATION ADMIN: CPT | Performed by: PEDIATRICS

## 2024-02-20 PROCEDURE — 99215 OFFICE O/P EST HI 40 MIN: CPT | Mod: 25 | Performed by: PEDIATRICS

## 2024-02-20 RX ORDER — CITALOPRAM HYDROBROMIDE 20 MG/1
20 TABLET ORAL DAILY
Qty: 90 TABLET | Refills: 3 | Status: CANCELLED | OUTPATIENT
Start: 2024-02-20

## 2024-02-20 RX ORDER — CITALOPRAM HYDROBROMIDE 40 MG/1
40 TABLET ORAL DAILY
Qty: 90 TABLET | Refills: 3 | Status: SHIPPED | OUTPATIENT
Start: 2024-02-20

## 2024-02-20 NOTE — PROGRESS NOTES
"Pt still having edema in ankles and feet   Assessment & Plan       ICD-10-CM    1. Iron deficiency anemia due to chronic blood loss  D50.0 Suspect continuing GI bleeding - hasn't tolerated PPI historically due to side effects, but would certainly benefit now.  Willing to repeat EGD - order placed.  Continue iron, will repeat labs prior to next visit in 3 months, sooner with worsening symptoms      2. History of GI bleed  Z87.19 Adult GI  Referral - Procedure Only  See above      3. Rheumatoid arthritis with positive rheumatoid factor, involving unspecified site (H)  M05.9 Follows with rheum and ortho      4. Sleep apnea, unspecified type  G47.30 Ordered a Smart Anti-Snoring Device online that helps tone the anterior throat muscles and finds this helpful - less snoring and feels more rested      5. Bilateral lower extremity edema  R60.0 Physical Therapy Referral  Last course of lasix wasn't helpful - continue compression stockings and start lymphedema therapy      6. Moderate episode of recurrent major depressive disorder (H)  F33.1 citalopram (CELEXA) 40 MG tablet  Increased dose to 40mg and follow      7. JAROD (generalized anxiety disorder)  F41.1 citalopram (CELEXA) 40 MG tablet      8. Primary osteoarthritis involving multiple joints  M15.9       9. Erosive gastropathy  K31.89 Adult GI  Referral - Procedure Only  See above      10. Need for shingles vaccine  Z23 ZOSTER RECOMBINANT ADJUVANTED (SHINGRIX)      11. Morbid obesity (H)  E66.01 Complicates management of sleep apnea, msk issues - encouraged in her efforts in nutrition and movement            I spent a total of 41 minutes on the day of the visit.   Time spent by me doing chart review, history and exam, documentation and further activities per the note      BMI  Estimated body mass index is 44.86 kg/m  as calculated from the following:    Height as of this encounter: 1.651 m (5' 5\").    Weight as of this encounter: 122.3 kg (269 lb 9.6 " "oz).   Weight management plan: Discussed healthy diet and exercise guidelines      See Patient Instructions    Subjective   Berna is a 63 year old, presenting for the following health issues:  Follow Up        2/20/2024    11:21 AM   Additional Questions   Roomed by Carmen HAMPTON     RUBEN - smart anti-snoring device - sleeping better - it helps to tone the throat muscles - not snoring as much per her  and feeling more rested!     LE edema - significant - lasix not helping as much anymore.  Using compression stockings.  Very interested in lymphedema treatment.  Working to stay more active.    Right and leg knee cortisone injections 1/30 - interestingly, LE swelling was better after cortisone.  Pain in knees better post cortisone    Hx of erosive gastropathy/ulcers in hiatal hernia - has been having more epigastric pain and then dark stools the next day.  Hgb trending down, though still in normal range and iron stores low.   Last EGD was in 2021.       Difficult year - parents passed away, best friend struggling with cancer - doing a bit better now that the days are getting longer, but has been more down.  Tolerating celexa well and open to increased dose        Objective    /76   Pulse 84   Temp 97.9  F (36.6  C) (Tympanic)   Resp 16   Ht 1.651 m (5' 5\")   Wt 122.3 kg (269 lb 9.6 oz)   SpO2 97%   BMI 44.86 kg/m    Body mass index is 44.86 kg/m .  Wt Readings from Last 4 Encounters:   02/20/24 122.3 kg (269 lb 9.6 oz)   01/02/24 119.3 kg (263 lb)   11/13/23 119.3 kg (263 lb)   07/11/23 119.2 kg (262 lb 12.8 oz)       Physical Exam   GENERAL: alert and no distress  CV: regular rates and rhythm, normal S1 S2, no S3 or S4, no murmur, click or rub, peripheral pulses strong, and 2+ bilateral lower extremity pitting edema to mid shin    ABDOMEN: tenderness epigastric and bowel sounds normal  SKIN: no suspicious lesions or rashes  PSYCH: mentation appears normal, affect normal/bright    Labs " reviewed        Signed Electronically by: Bing Fung MD

## 2024-02-20 NOTE — PATIENT INSTRUCTIONS
Increase citalopram to 40mg    Expect a call to schedule with lymphedema therapy downstairs    Expect a call to schedule your upper endoscopy at [facility]    We'll help schedule your Welcome to Medicare visit in May with labs prior

## 2024-03-01 NOTE — PROGRESS NOTES
PHYSICAL THERAPY EVALUATION  Type of Visit: Evaluation    See electronic medical record for Abuse and Falls Screening details.    Subjective       Presenting condition or subjective complaint: Ankles and feet swell so i cant get shoes on.  My skin hurts and feels like its going to split  Date of onset: 02/20/24 (order date)    Relevant medical history: Anemia; Arthritis; Depression; Menopause; Osteoarthritis; Overweight; Pain at night or rest   Dates & types of surgery: Lithotripsy 2019, 2015. Hysterectomy 1996. Gangl[place] Cyst removal lft hand 1982. [place] teeth removal 1975. Tonsillectomy 1964.    Prior diagnostic imaging/testing results:       Prior therapy history for the same diagnosis, illness or injury: No      Pt reports one episode of cellulitis earlier this year. She has a history of starr knee pain- off and on for 5 years- OA, the orthopedist has recommended a TKA. At first, just her knees were swelling. It became a constant issue in September 2022. Her mother was diagnosed with cancer at that time. She was having increased knee pain and swelling at that time. She went to [facility] and they started doing injections regularly. She got an injection in the end of January and the swelling started to go down after her episode of cellulitis. It only lasted a few days. She has compression socks that sometimes she cannot get on when her legs change in size. She has not been to Vascular Medicine. She has copperfit s/m compression socks.     Prior Level of Function  Transfers:   Ambulation:   ADL:   IADL:     Living Environment  Social support: With a significant other or spouse   Type of home: House; 2-story   Stairs to enter the home: Yes 4 Is there a railing: Yes   Ramp: No   Stairs inside the home: Yes 14 Is there a railing: Yes   Help at home: None  Equipment owned:       Employment: No    Hobbies/Interests: Reading, gardening, sewing    Patient goals for therapy: Learn ways to help reduce swelling when  it gets severe.  Walk without fear of loosing my balance and falling.    Pain assessment: Pain present- in starr knees     Objective       EDEMA EVALUATION  Additional history:  Body part affected by edema: Both knees, ankles &feet  If cancer related, treatment:    If not cancer related, problems with veins or cause of swelling: No  Distance able to walk: 1/4 mile on flat paved surface  Time able to stand: 5 minutes  Sensation problems in hands/feet: Yes Cold feet, tingling  Edema etiology: Unknown    FUNCTIONAL SCALES  Lower Extremity Functional Scale (score out of 80). A lower score indicates greater impairment:    Shoulder Pain and Disability Index (score out of 100).  A higher score indicates greater impairment:      Cognitive Status Examination  Orientation: Oriented to person, place and time   Level of Consciousness: Alert  Follows Commands and Answers Questions: 100% of the time  Personal Safety and Judgement: Intact  Memory: Intact    EDEMA  Skin Condition: Dryness, Pitting  Scar: No  Capillary Refill:   Radial Pulse:   Dorsal Pedal Pulse:   Stemmer Sign: +  Ulceration: No    GIRTH MEASUREMENTS: Refer to separate girth measurement flowsheet.     VOLUME UE  Right UE (mL)    Left UE (mL)    UE Volume Comparison    % Difference    VOLUME LE  Right LE (mL) 3302.84    Left LE (mL) 3948.36    LE Volume Comparison LLE volume greater than RLE volume   % Difference 14%   Head/Neck Volume     Trunk Volume    Genital Volume       RANGE OF MOTION: LE ROM WFL  STRENGTH: LE Strength WNL  POSTURE:   PALPATION: diffuse tenderness throughout starr lower legs and knees  ACTIVITIES OF DAILY LIVING:   BED MOBILITY: Independent  TRANSFERS: Independent  SENSATION:   VASCULAR:  white toes  COORDINATION:   MUSCLE TONE:     Assessment & Plan   CLINICAL IMPRESSIONS  Medical Diagnosis: Bilateral lower extremity edema    Treatment Diagnosis: lymphedema   Impression/Assessment: Patient is a 63 year old female with starr LE swelling  complaints.  The following significant findings have been identified: Pain, Decreased strength, Impaired balance, Impaired sensation, Edema, Impaired gait, and Decreased activity tolerance. These impairments interfere with their ability to perform self care tasks, recreational activities, household chores, household mobility, and community mobility as compared to previous level of function.     Clinical Decision Making (Complexity):  Clinical Presentation: Stable/Uncomplicated  Clinical Presentation Rationale: based on medical and personal factors listed in PT evaluation  Clinical Decision Making (Complexity): Low complexity    PLAN OF CARE  Treatment Interventions:  Interventions: Gait Training, Manual Therapy, Neuromuscular Re-education, Therapeutic Activity, Therapeutic Exercise, Self-Care/Home Management, Gradient Compression Bandaging    Long Term Goals     PT Goal 1  Goal Identifier: Self-care  Goal Description: Pt will be independent in self-care and home management of condition including skin care, compression wear/care, self MLD and exercise.  Target Date: 04/29/24  PT Goal 2  Goal Identifier: Volumes  Goal Description: Pt will demonstrate an improvement in starr LE edema volumes by a decrease of >10% to reduce infection risk and improve fit of shoes  Target Date: 04/29/24  PT Goal 3  Goal Identifier: LLIS  Goal Description: Pt will demonstrate an improvment in quality of life and self-management of condition by decreasing their LLIS score by> 11 points.  Target Date: 04/29/24      Frequency of Treatment: 1-3x/week  Duration of Treatment: 8 weeks to allow for time to get in at 3x/week    Recommended Referrals to Other Professionals:   Education Assessment:   Learner/Method: Patient    Risks and benefits of evaluation/treatment have been explained.   Patient/Family/caregiver agrees with Plan of Care.     Evaluation Time:     PT Eval, Low Complexity Minutes (30735): 25       Signing Clinician: Angie REGALADO  Kailee, PT      [facility]                                                                                   OUTPATIENT PHYSICAL THERAPY      PLAN OF TREATMENT FOR OUTPATIENT REHABILITATION   Patient's Last Name, First Name, Berna Rosas YOB: 1960   Provider's Name   [facility]   Medical Record No.  [id number]     Onset Date: 02/20/24 (order date)  Start of Care Date: 03/04/24     Medical Diagnosis:  Bilateral lower extremity edema      PT Treatment Diagnosis:  lymphedema Plan of Treatment  Frequency/Duration: 1-3x/week/ 8 weeks to allow for time to get in at 3x/week    Certification date from 03/04/24 to 04/29/24         See note for plan of treatment details and functional goals     Angie Dugan, PT                         I CERTIFY THE NEED FOR THESE SERVICES FURNISHED UNDER        THIS PLAN OF TREATMENT AND WHILE UNDER MY CARE     (Physician attestation of this document indicates review and certification of the therapy plan).              Referring Provider:  Bing Fung    Initial Assessment  See Epic Evaluation- Start of Care Date: 03/04/24

## 2024-03-04 ENCOUNTER — THERAPY VISIT (OUTPATIENT)
Dept: PHYSICAL THERAPY | Facility: CLINIC | Age: 64
End: 2024-03-04
Attending: PEDIATRICS
Payer: COMMERCIAL

## 2024-03-04 ENCOUNTER — TELEPHONE (OUTPATIENT)
Dept: GASTROENTEROLOGY | Facility: CLINIC | Age: 64
End: 2024-03-04

## 2024-03-04 DIAGNOSIS — R60.0 BILATERAL LOWER EXTREMITY EDEMA: ICD-10-CM

## 2024-03-04 PROCEDURE — 97535 SELF CARE MNGMENT TRAINING: CPT | Mod: GP | Performed by: PHYSICAL THERAPIST

## 2024-03-04 PROCEDURE — 97140 MANUAL THERAPY 1/> REGIONS: CPT | Mod: GP | Performed by: PHYSICAL THERAPIST

## 2024-03-04 PROCEDURE — 97161 PT EVAL LOW COMPLEX 20 MIN: CPT | Mod: GP | Performed by: PHYSICAL THERAPIST

## 2024-03-04 NOTE — TELEPHONE ENCOUNTER
"Endoscopy Scheduling Screen    Have you had a positive Covid test in the last 14 days?  No    Are you active on MyChart?   Yes    What insurance is in the chart?  Other:  [facility]    Ordering/Referring Provider:     NEERAJ MANCILLA      (If ordering provider performs procedure, schedule with ordering provider unless otherwise instructed. )    BMI: Estimated body mass index is 44.86 kg/m  as calculated from the following:    Height as of 2/20/24: 1.651 m (5' 5\").    Weight as of 2/20/24: 122.3 kg (269 lb 9.6 oz).     Sedation Ordered  moderate sedation.   If patient BMI > 50 do not schedule in [facility].    If patient BMI > 45 do not schedule at [facility].    Are you taking methadone or Suboxone?  No    Have you had difficulties, pain, or discomfort during past endoscopy procedures?  No    Are you taking any prescription medications for pain 3 or more times per week?   NO - No RN review required.    Do you have a history of malignant hyperthermia or adverse reaction to anesthesia?  No    (Females) Are you currently pregnant?   No     Have you been diagnosed or told you have pulmonary hypertension?   No    Do you have an LVAD?  No    Have you been told you have moderate to severe sleep apnea?  No    Have you been told you have COPD, asthma, or any other lung disease?  No    Do you have any heart conditions?  No     Have you ever had an organ transplant?   No    Have you ever had or are you awaiting a heart or lung transplant?   No    Have you had a stroke or transient ischemic attack (TIA aka \"mini stroke\" in the last 6 months?   No    Have you been diagnosed with or been told you have cirrhosis of the liver?   No    Are you currently on dialysis?   No    Do you need assistance transferring?   No    BMI: Estimated body mass index is 44.86 kg/m  as calculated from the following:    Height as of 2/20/24: 1.651 m (5' 5\").    Weight as of 2/20/24: 122.3 kg (269 lb 9.6 oz).     Is patients BMI > 40 and scheduling location " [facility]?  No    Do you take an injectable medication for weight loss or diabetes (excluding insulin)?  No    Do you take the medication Naltrexone?  No    Do you take blood thinners?  No       Prep   Are you currently on dialysis or do you have chronic kidney disease?  No    Do you have a diagnosis of diabetes?  No    Do you have a diagnosis of cystic fibrosis (CF)?  No    On a regular basis do you go 3 -5 days between bowel movements?  No    BMI > 40?  No    Preferred Pharmacy:    [id number] - [place], [place] - [address] AT [facility] [address]  [address]  [place] [address]  Phone: [phone] Fax: [phone]      Final Scheduling Details   Colonoscopy prep sent?  N/A    Procedure scheduled  Upper endoscopy (EGD)    Surgeon:  BAL     Date of procedure:  05/30/2024     Pre-OP / PAC:   No - Not required for this site.    Location  [facility] - Per order.    Sedation   Moderate Sedation - Patient preference.      Patient Reminders:   You will receive a call from a Nurse to review instructions and health history.  This assessment must be completed prior to your procedure.  Failure to complete the Nurse assessment may result in the procedure being cancelled.      On the day of your procedure, please designate an adult(s) who can drive you home stay with you for the next 24 hours. The medicines used in the exam will make you sleepy. You will not be able to drive.      You cannot take public transportation, ride share services, or non-medical taxi service without a responsible caregiver.  Medical transport services are allowed with the requirement that a responsible caregiver will receive you at your destination.  We require that drivers and caregivers are confirmed prior to your procedure.

## 2024-03-06 ENCOUNTER — THERAPY VISIT (OUTPATIENT)
Dept: PHYSICAL THERAPY | Facility: CLINIC | Age: 64
End: 2024-03-06
Payer: COMMERCIAL

## 2024-03-06 DIAGNOSIS — R60.0 BILATERAL LOWER EXTREMITY EDEMA: Primary | ICD-10-CM

## 2024-03-06 DIAGNOSIS — I89.0 LYMPHEDEMA OF BOTH LOWER EXTREMITIES: ICD-10-CM

## 2024-03-06 DIAGNOSIS — L90.5 SCAR CONDITION AND FIBROSIS OF SKIN: ICD-10-CM

## 2024-03-06 PROCEDURE — 97535 SELF CARE MNGMENT TRAINING: CPT | Mod: GP | Performed by: PHYSICAL THERAPIST

## 2024-03-06 PROCEDURE — 97140 MANUAL THERAPY 1/> REGIONS: CPT | Mod: GP | Performed by: PHYSICAL THERAPIST

## 2024-03-06 PROCEDURE — 97110 THERAPEUTIC EXERCISES: CPT | Mod: GP | Performed by: PHYSICAL THERAPIST

## 2024-03-13 ENCOUNTER — THERAPY VISIT (OUTPATIENT)
Dept: PHYSICAL THERAPY | Facility: CLINIC | Age: 64
End: 2024-03-13
Payer: COMMERCIAL

## 2024-03-13 DIAGNOSIS — R60.0 BILATERAL LOWER EXTREMITY EDEMA: ICD-10-CM

## 2024-03-13 DIAGNOSIS — I89.0 LYMPHEDEMA OF BOTH LOWER EXTREMITIES: Primary | ICD-10-CM

## 2024-03-13 DIAGNOSIS — L90.5 SCAR CONDITION AND FIBROSIS OF SKIN: ICD-10-CM

## 2024-03-13 PROCEDURE — 97140 MANUAL THERAPY 1/> REGIONS: CPT | Mod: GP | Performed by: PHYSICAL THERAPIST

## 2024-03-13 PROCEDURE — 97535 SELF CARE MNGMENT TRAINING: CPT | Mod: GP | Performed by: PHYSICAL THERAPIST

## 2024-03-13 PROCEDURE — 97110 THERAPEUTIC EXERCISES: CPT | Mod: GP | Performed by: PHYSICAL THERAPIST

## 2024-03-18 ENCOUNTER — THERAPY VISIT (OUTPATIENT)
Dept: PHYSICAL THERAPY | Facility: CLINIC | Age: 64
End: 2024-03-18
Payer: COMMERCIAL

## 2024-03-18 DIAGNOSIS — R60.0 BILATERAL LOWER EXTREMITY EDEMA: Primary | ICD-10-CM

## 2024-03-18 PROCEDURE — 97140 MANUAL THERAPY 1/> REGIONS: CPT | Mod: GP | Performed by: PHYSICAL THERAPIST

## 2024-03-18 PROCEDURE — 97110 THERAPEUTIC EXERCISES: CPT | Mod: GP | Performed by: PHYSICAL THERAPIST

## 2024-03-25 ENCOUNTER — THERAPY VISIT (OUTPATIENT)
Dept: PHYSICAL THERAPY | Facility: CLINIC | Age: 64
End: 2024-03-25
Payer: COMMERCIAL

## 2024-03-25 DIAGNOSIS — R60.0 BILATERAL LOWER EXTREMITY EDEMA: Primary | ICD-10-CM

## 2024-03-25 PROCEDURE — 97140 MANUAL THERAPY 1/> REGIONS: CPT | Mod: GP | Performed by: PHYSICAL THERAPIST

## 2024-03-27 ENCOUNTER — THERAPY VISIT (OUTPATIENT)
Dept: PHYSICAL THERAPY | Facility: CLINIC | Age: 64
End: 2024-03-27
Payer: COMMERCIAL

## 2024-03-27 DIAGNOSIS — I89.0 LYMPHEDEMA OF BOTH LOWER EXTREMITIES: ICD-10-CM

## 2024-03-27 DIAGNOSIS — R60.0 BILATERAL LOWER EXTREMITY EDEMA: Primary | ICD-10-CM

## 2024-03-27 DIAGNOSIS — L90.5 SCAR CONDITION AND FIBROSIS OF SKIN: ICD-10-CM

## 2024-03-27 PROCEDURE — 97112 NEUROMUSCULAR REEDUCATION: CPT | Mod: GP | Performed by: PHYSICAL THERAPIST

## 2024-03-27 PROCEDURE — 97110 THERAPEUTIC EXERCISES: CPT | Mod: GP | Performed by: PHYSICAL THERAPIST

## 2024-03-27 PROCEDURE — 97140 MANUAL THERAPY 1/> REGIONS: CPT | Mod: GP | Performed by: PHYSICAL THERAPIST

## 2024-03-29 ENCOUNTER — THERAPY VISIT (OUTPATIENT)
Dept: PHYSICAL THERAPY | Facility: CLINIC | Age: 64
End: 2024-03-29
Payer: COMMERCIAL

## 2024-03-29 DIAGNOSIS — I89.0 LYMPHEDEMA OF BOTH LOWER EXTREMITIES: ICD-10-CM

## 2024-03-29 DIAGNOSIS — L90.5 SCAR CONDITION AND FIBROSIS OF SKIN: ICD-10-CM

## 2024-03-29 DIAGNOSIS — R60.0 BILATERAL LOWER EXTREMITY EDEMA: Primary | ICD-10-CM

## 2024-03-29 PROCEDURE — 97140 MANUAL THERAPY 1/> REGIONS: CPT | Mod: GP | Performed by: PHYSICAL THERAPIST

## 2024-03-29 PROCEDURE — 97110 THERAPEUTIC EXERCISES: CPT | Mod: GP | Performed by: PHYSICAL THERAPIST

## 2024-04-01 ENCOUNTER — THERAPY VISIT (OUTPATIENT)
Dept: PHYSICAL THERAPY | Facility: CLINIC | Age: 64
End: 2024-04-01
Payer: COMMERCIAL

## 2024-04-01 DIAGNOSIS — R60.0 BILATERAL LOWER EXTREMITY EDEMA: Primary | ICD-10-CM

## 2024-04-01 PROCEDURE — 97140 MANUAL THERAPY 1/> REGIONS: CPT | Mod: GP | Performed by: PHYSICAL THERAPIST

## 2024-04-02 ENCOUNTER — THERAPY VISIT (OUTPATIENT)
Dept: PHYSICAL THERAPY | Facility: CLINIC | Age: 64
End: 2024-04-02
Payer: COMMERCIAL

## 2024-04-02 DIAGNOSIS — R60.0 BILATERAL LOWER EXTREMITY EDEMA: Primary | ICD-10-CM

## 2024-04-02 DIAGNOSIS — I89.0 LYMPHEDEMA OF BOTH LOWER EXTREMITIES: ICD-10-CM

## 2024-04-02 DIAGNOSIS — L90.5 SCAR CONDITION AND FIBROSIS OF SKIN: ICD-10-CM

## 2024-04-02 PROCEDURE — 97110 THERAPEUTIC EXERCISES: CPT | Mod: GP | Performed by: PHYSICAL THERAPIST

## 2024-04-02 PROCEDURE — 97140 MANUAL THERAPY 1/> REGIONS: CPT | Mod: GP | Performed by: PHYSICAL THERAPIST

## 2024-04-03 ENCOUNTER — THERAPY VISIT (OUTPATIENT)
Dept: PHYSICAL THERAPY | Facility: CLINIC | Age: 64
End: 2024-04-03
Payer: COMMERCIAL

## 2024-04-03 DIAGNOSIS — R52 PAIN: ICD-10-CM

## 2024-04-03 DIAGNOSIS — R60.0 BILATERAL LOWER EXTREMITY EDEMA: ICD-10-CM

## 2024-04-03 DIAGNOSIS — I89.0 LYMPHEDEMA OF BOTH LOWER EXTREMITIES: Primary | ICD-10-CM

## 2024-04-03 DIAGNOSIS — L90.5 SCAR CONDITION AND FIBROSIS OF SKIN: ICD-10-CM

## 2024-04-03 PROCEDURE — 97140 MANUAL THERAPY 1/> REGIONS: CPT | Mod: GP | Performed by: PHYSICAL THERAPIST

## 2024-04-03 PROCEDURE — 97110 THERAPEUTIC EXERCISES: CPT | Mod: GP | Performed by: PHYSICAL THERAPIST

## 2024-04-03 PROCEDURE — 97112 NEUROMUSCULAR REEDUCATION: CPT | Mod: GP | Performed by: PHYSICAL THERAPIST

## 2024-04-08 ENCOUNTER — THERAPY VISIT (OUTPATIENT)
Dept: PHYSICAL THERAPY | Facility: CLINIC | Age: 64
End: 2024-04-08
Payer: COMMERCIAL

## 2024-04-08 DIAGNOSIS — R60.0 BILATERAL LOWER EXTREMITY EDEMA: Primary | ICD-10-CM

## 2024-04-08 PROCEDURE — 97140 MANUAL THERAPY 1/> REGIONS: CPT | Mod: GP | Performed by: PHYSICAL THERAPIST

## 2024-04-08 NOTE — PROGRESS NOTES
PLAN  Continue therapy per current plan of care.    Beginning/End Dates of Progress Note Reporting Period:  03/04/24 to 04/08/2024    Referring Provider:  Bing Fung     04/08/24 0500   Appointment Info   Signing clinician's name / credentials Angie Dugan, PT, DPT, CLDANIE   Total/Authorized Visits [employer] (PMAP) currently; transitioning to Medicare in May 1, 2024   Visits Used 10   Medical Diagnosis Bilateral lower extremity edema   PT Tx Diagnosis Bilateral lower extremity edema   Quick Adds Certification   Progress Note/Certification   Start of Care Date 03/04/24   Onset of illness/injury or Date of Surgery 02/20/24  (order date)   Therapy Frequency 1-3x/week   Predicted Duration 8 weeks to allow for time to get in at 3x/week   Certification date from 03/04/24   Certification date to 04/29/24   Progress Note Due Date 04/29/24   Progress Note Completed Date 03/04/24   GOALS   PT Goals 2;3;4   PT Goal 1   Goal Identifier Self-care   Goal Description Pt will be independent in self-care and home management of condition including skin care, compression wear/care, self MLD and exercise.   Goal Progress improving- doing exercises, bandaging, short walks, has compression fitting appt set for next wednesday   Target Date 04/29/24   PT Goal 2   Goal Identifier Volumes   Goal Description Pt will demonstrate an improvement in starr LE edema volumes by a decrease of >10% to reduce infection risk and improve fit of shoes   Goal Progress MET:  L lower leg decreased 32%; R lower leg decreased 16.6%; Lremains > R = 6%   Target Date 04/29/24   Date Met 04/03/24   PT Goal 3   Goal Identifier LLIS   Goal Description Pt will demonstrate an improvment in quality of life and self-management of condition by decreasing their LLIS score by> 11 points.   Goal Progress improving by 5 pts today   Target Date 04/29/24   Subjective Report   Subjective Report Pt took the GCB off Sunday. Everything else is going well- her back is  feeling better. She washed the bandages last night.   Objective Measure 1   Objective Measure LE edema   Details R LE: 1+ dorsum of foot, no pitting edema in pretitibial area, L LE: 1+ pretitibial and dorsum of foot   Objective Measure 2   Objective Measure volume LEs   Details not measured today   Treatment Interventions (PT)   Interventions Manual Therapy;Self Care/Home Management   Therapeutic Procedure/Exercise   Therapeutic Procedures: strength, endurance, ROM, flexibility minutes (83073) 5   Ther Proc 1 strengthening, stretching, aerobic, edema   Ther Proc 1 - Details pt to continue to work on strengthening and mobility exercises- focus on consistency   Skilled Intervention edu in importance for long term management of knee pain and swelling   Patient Response/Progress verbalized understanding   Manual Therapy   Manual Therapy: Mobilization, MFR, MLD, friction massage minutes (60182) 55   Manual Therapy Manual Therapy 2   Manual Therapy 1 Compression bandages   Manual Therapy 1 - Details R LE as follows: lotion, cotton stockinette, foam pad for dorsum of feet- can alternate with ankles as well, rosidal soft padding from ankle to knee, 02 comprilan rolls (8 cm x 5m , 10 cm x 5 m); L LE as follows: lotion, cotton stockinette, rosidal soft ankle to knee, and knee to mid-thigh, 02 comprilan from foot to knee (8 cm x 5 m, 10 cm x 5 m) and 12 cm x 5 m comprilan from knee to thigh; pt to consider bandaging up higher on thigh - would likely need 12 cm x 10 m bandage   Manual Therapy 2 compression   Manual Therapy 2 - Details pt set up with compression fitting appt next week   Manual Therapy 3 manual lymphatic draiange   Manual Therapy 3 - Details Performing MLD trunk and B LEs stimulating all regional LNs including superficial and deep abdominals with emphasis on fibrosis softening B medial knee bundle, along achilles and medial > lateral distal feet and dorsum of feet with toe clearing, encouraged toe movement  "throughout the day to improve edema   Skilled Intervention MLD, compression bandages to stimulate lymphatic system and reduce swelling   Patient Response/Progress good tolerance to bandaging and MLD, decreased dry and flakey skin, decreased pitting edema   Education   Learner/Method Patient   Plan   Home program gentle SKTC stretch and hooklying LTR, seated cat/cow and genlte low back stretchPTRx-Pooler, supine pelvic tilt x 10, Abdominal brace TA 5\" hold x 10, glute isometric 5\" x 10, bridging 5\" hold, CS, SLR;  pretzel stretch (modified for seated modified pirifomis stretch) 20\" hold, sabiha stretch x 20\"; standing HR/TR;  get up and move during day every hour with at least 1 extended walk to tolerance, look into fitness options; GCB B LEs limiting time out of GCB to 1 hour prior to fitter appointment.   Plan for next session could end with SciFit, could increase bandaging up to groin if pt agrees, STM/MLD, GCB   Comments   Comments leaving for vacation 4/24- 4/28 and would prefer to have new comrpession stocking by then; stating insurance coverage 2 pair every 3 months   Total Session Time   Timed Code Treatment Minutes 60   Total Treatment Time (sum of timed and untimed services) 60       "

## 2024-04-10 ENCOUNTER — THERAPY VISIT (OUTPATIENT)
Dept: PHYSICAL THERAPY | Facility: CLINIC | Age: 64
End: 2024-04-10
Payer: COMMERCIAL

## 2024-04-10 DIAGNOSIS — I89.0 LYMPHEDEMA OF BOTH LOWER EXTREMITIES: Primary | ICD-10-CM

## 2024-04-10 DIAGNOSIS — R52 PAIN: ICD-10-CM

## 2024-04-10 DIAGNOSIS — L90.5 SCAR CONDITION AND FIBROSIS OF SKIN: ICD-10-CM

## 2024-04-10 DIAGNOSIS — R60.0 BILATERAL LOWER EXTREMITY EDEMA: ICD-10-CM

## 2024-04-10 PROCEDURE — 97110 THERAPEUTIC EXERCISES: CPT | Mod: GP | Performed by: PHYSICAL THERAPIST

## 2024-04-10 PROCEDURE — 97140 MANUAL THERAPY 1/> REGIONS: CPT | Mod: GP | Performed by: PHYSICAL THERAPIST

## 2024-04-12 ENCOUNTER — THERAPY VISIT (OUTPATIENT)
Dept: PHYSICAL THERAPY | Facility: CLINIC | Age: 64
End: 2024-04-12
Payer: COMMERCIAL

## 2024-04-12 DIAGNOSIS — R52 PAIN: ICD-10-CM

## 2024-04-12 DIAGNOSIS — L90.5 SCAR CONDITION AND FIBROSIS OF SKIN: ICD-10-CM

## 2024-04-12 DIAGNOSIS — I89.0 LYMPHEDEMA OF BOTH LOWER EXTREMITIES: Primary | ICD-10-CM

## 2024-04-12 DIAGNOSIS — R60.0 BILATERAL LOWER EXTREMITY EDEMA: ICD-10-CM

## 2024-04-12 PROCEDURE — 97112 NEUROMUSCULAR REEDUCATION: CPT | Mod: GP | Performed by: PHYSICAL THERAPIST

## 2024-04-12 PROCEDURE — 97110 THERAPEUTIC EXERCISES: CPT | Mod: GP | Performed by: PHYSICAL THERAPIST

## 2024-04-12 PROCEDURE — 97140 MANUAL THERAPY 1/> REGIONS: CPT | Mod: GP | Performed by: PHYSICAL THERAPIST

## 2024-04-15 ENCOUNTER — THERAPY VISIT (OUTPATIENT)
Dept: PHYSICAL THERAPY | Facility: CLINIC | Age: 64
End: 2024-04-15
Payer: COMMERCIAL

## 2024-04-15 DIAGNOSIS — R60.0 BILATERAL LOWER EXTREMITY EDEMA: Primary | ICD-10-CM

## 2024-04-15 PROCEDURE — 97140 MANUAL THERAPY 1/> REGIONS: CPT | Mod: GP | Performed by: PHYSICAL THERAPIST

## 2024-04-15 PROCEDURE — 97110 THERAPEUTIC EXERCISES: CPT | Mod: GP | Performed by: PHYSICAL THERAPIST

## 2024-04-19 ENCOUNTER — THERAPY VISIT (OUTPATIENT)
Dept: PHYSICAL THERAPY | Facility: CLINIC | Age: 64
End: 2024-04-19
Payer: COMMERCIAL

## 2024-04-19 DIAGNOSIS — R60.0 BILATERAL LOWER EXTREMITY EDEMA: Primary | ICD-10-CM

## 2024-04-19 DIAGNOSIS — R52 PAIN: ICD-10-CM

## 2024-04-19 DIAGNOSIS — I89.0 LYMPHEDEMA OF BOTH LOWER EXTREMITIES: ICD-10-CM

## 2024-04-19 DIAGNOSIS — L90.5 SCAR CONDITION AND FIBROSIS OF SKIN: ICD-10-CM

## 2024-04-19 PROCEDURE — 97140 MANUAL THERAPY 1/> REGIONS: CPT | Mod: GP | Performed by: PHYSICAL THERAPIST

## 2024-04-19 PROCEDURE — 97110 THERAPEUTIC EXERCISES: CPT | Mod: GP | Performed by: PHYSICAL THERAPIST

## 2024-04-30 ENCOUNTER — TRANSFERRED RECORDS (OUTPATIENT)
Dept: HEALTH INFORMATION MANAGEMENT | Facility: CLINIC | Age: 64
End: 2024-04-30
Payer: COMMERCIAL

## 2024-05-02 ENCOUNTER — LAB (OUTPATIENT)
Dept: LAB | Facility: CLINIC | Age: 64
End: 2024-05-02
Payer: COMMERCIAL

## 2024-05-02 DIAGNOSIS — D50.0 IRON DEFICIENCY ANEMIA DUE TO CHRONIC BLOOD LOSS: ICD-10-CM

## 2024-05-02 DIAGNOSIS — Z87.19 HISTORY OF GI BLEED: ICD-10-CM

## 2024-05-02 DIAGNOSIS — R60.0 BILATERAL LOWER EXTREMITY EDEMA: ICD-10-CM

## 2024-05-02 DIAGNOSIS — E66.01 MORBID OBESITY (H): ICD-10-CM

## 2024-05-02 LAB
BASOPHILS # BLD AUTO: 0 10E3/UL (ref 0–0.2)
BASOPHILS NFR BLD AUTO: 0 %
EOSINOPHIL # BLD AUTO: 0 10E3/UL (ref 0–0.7)
EOSINOPHIL NFR BLD AUTO: 0 %
ERYTHROCYTE [DISTWIDTH] IN BLOOD BY AUTOMATED COUNT: 16.9 % (ref 10–15)
HBA1C MFR BLD: 5.7 % (ref 0–5.6)
HCT VFR BLD AUTO: 33.8 % (ref 35–47)
HGB BLD-MCNC: 10.1 G/DL (ref 11.7–15.7)
IMM GRANULOCYTES # BLD: 0.2 10E3/UL
IMM GRANULOCYTES NFR BLD: 1 %
LYMPHOCYTES # BLD AUTO: 2 10E3/UL (ref 0.8–5.3)
LYMPHOCYTES NFR BLD AUTO: 10 %
MCH RBC QN AUTO: 25.5 PG (ref 26.5–33)
MCHC RBC AUTO-ENTMCNC: 29.9 G/DL (ref 31.5–36.5)
MCV RBC AUTO: 85 FL (ref 78–100)
MONOCYTES # BLD AUTO: 1.1 10E3/UL (ref 0–1.3)
MONOCYTES NFR BLD AUTO: 5 %
NEUTROPHILS # BLD AUTO: 17.3 10E3/UL (ref 1.6–8.3)
NEUTROPHILS NFR BLD AUTO: 84 %
PLATELET # BLD AUTO: 396 10E3/UL (ref 150–450)
RBC # BLD AUTO: 3.96 10E6/UL (ref 3.8–5.2)
WBC # BLD AUTO: 20.5 10E3/UL (ref 4–11)

## 2024-05-02 PROCEDURE — 82728 ASSAY OF FERRITIN: CPT

## 2024-05-02 PROCEDURE — 83550 IRON BINDING TEST: CPT

## 2024-05-02 PROCEDURE — 36415 COLL VENOUS BLD VENIPUNCTURE: CPT

## 2024-05-02 PROCEDURE — 83036 HEMOGLOBIN GLYCOSYLATED A1C: CPT

## 2024-05-02 PROCEDURE — 80053 COMPREHEN METABOLIC PANEL: CPT

## 2024-05-02 PROCEDURE — 83540 ASSAY OF IRON: CPT

## 2024-05-02 PROCEDURE — 80061 LIPID PANEL: CPT

## 2024-05-02 PROCEDURE — 85025 COMPLETE CBC W/AUTO DIFF WBC: CPT

## 2024-05-02 PROCEDURE — 84443 ASSAY THYROID STIM HORMONE: CPT

## 2024-05-02 NOTE — TELEPHONE ENCOUNTER
Patient called in stating they received a voicemail message to complete a pre-registration.  Patient states they deleted the voicemail and do not know which number they were supposed to call so they called us.  Advised patient that our team will call for pre assessment call approximately two weeks prior to procedure. Patient verbalized understanding and stated they will wait to hear back from the registration department that left a voicemail previously.     Writer found note in chart regarding central pre-registration under authorization/certifications from [employer]. No phone number listed. Message sent to [employer] to inform them that patient is returning call for pre-registration.     Philly King RN  Endoscopy Procedure Pre-Assessment RN  [phone], option 4

## 2024-05-03 LAB
ALBUMIN SERPL BCG-MCNC: 4.4 G/DL (ref 3.5–5.2)
ALP SERPL-CCNC: 90 U/L (ref 40–150)
ALT SERPL W P-5'-P-CCNC: 25 U/L (ref 0–50)
ANION GAP SERPL CALCULATED.3IONS-SCNC: 11 MMOL/L (ref 7–15)
AST SERPL W P-5'-P-CCNC: 16 U/L (ref 0–45)
BILIRUB SERPL-MCNC: 0.3 MG/DL
BUN SERPL-MCNC: 22.2 MG/DL (ref 8–23)
CALCIUM SERPL-MCNC: 9.4 MG/DL (ref 8.8–10.2)
CHLORIDE SERPL-SCNC: 101 MMOL/L (ref 98–107)
CHOLEST SERPL-MCNC: 210 MG/DL
CREAT SERPL-MCNC: 0.7 MG/DL (ref 0.51–0.95)
DEPRECATED HCO3 PLAS-SCNC: 26 MMOL/L (ref 22–29)
EGFRCR SERPLBLD CKD-EPI 2021: >90 ML/MIN/1.73M2
FASTING STATUS PATIENT QL REPORTED: ABNORMAL
FERRITIN SERPL-MCNC: 17 NG/ML (ref 11–328)
GLUCOSE SERPL-MCNC: 118 MG/DL (ref 70–99)
HDLC SERPL-MCNC: 100 MG/DL
IRON BINDING CAPACITY (ROCHE): 400 UG/DL (ref 240–430)
IRON SATN MFR SERPL: 3 % (ref 15–46)
IRON SERPL-MCNC: 13 UG/DL (ref 37–145)
LDLC SERPL CALC-MCNC: 98 MG/DL
NONHDLC SERPL-MCNC: 110 MG/DL
POTASSIUM SERPL-SCNC: 4.1 MMOL/L (ref 3.4–5.3)
PROT SERPL-MCNC: 6.9 G/DL (ref 6.4–8.3)
SODIUM SERPL-SCNC: 138 MMOL/L (ref 135–145)
TRIGL SERPL-MCNC: 62 MG/DL
TSH SERPL DL<=0.005 MIU/L-ACNC: 1.12 UIU/ML (ref 0.3–4.2)

## 2024-05-06 ENCOUNTER — LAB (OUTPATIENT)
Dept: LAB | Facility: CLINIC | Age: 64
End: 2024-05-06
Payer: COMMERCIAL

## 2024-05-06 DIAGNOSIS — R30.0 DYSURIA: ICD-10-CM

## 2024-05-06 LAB
ALBUMIN UR-MCNC: NEGATIVE MG/DL
APPEARANCE UR: CLEAR
BILIRUB UR QL STRIP: NEGATIVE
COLOR UR AUTO: YELLOW
GLUCOSE UR STRIP-MCNC: NEGATIVE MG/DL
HGB UR QL STRIP: NEGATIVE
KETONES UR STRIP-MCNC: NEGATIVE MG/DL
LEUKOCYTE ESTERASE UR QL STRIP: NEGATIVE
NITRATE UR QL: NEGATIVE
PH UR STRIP: 7 [PH] (ref 5–7)
SP GR UR STRIP: 1.02 (ref 1–1.03)
UROBILINOGEN UR STRIP-ACNC: 0.2 E.U./DL

## 2024-05-06 PROCEDURE — 81003 URINALYSIS AUTO W/O SCOPE: CPT

## 2024-05-09 ENCOUNTER — OFFICE VISIT (OUTPATIENT)
Dept: PEDIATRICS | Facility: CLINIC | Age: 64
End: 2024-05-09
Payer: COMMERCIAL

## 2024-05-09 VITALS
TEMPERATURE: 98.3 F | SYSTOLIC BLOOD PRESSURE: 113 MMHG | BODY MASS INDEX: 44.07 KG/M2 | WEIGHT: 264.5 LBS | OXYGEN SATURATION: 97 % | HEIGHT: 65 IN | DIASTOLIC BLOOD PRESSURE: 78 MMHG | RESPIRATION RATE: 14 BRPM | HEART RATE: 84 BPM

## 2024-05-09 DIAGNOSIS — Z00.00 MEDICARE ANNUAL WELLNESS VISIT, INITIAL: Primary | ICD-10-CM

## 2024-05-09 DIAGNOSIS — F41.1 GAD (GENERALIZED ANXIETY DISORDER): ICD-10-CM

## 2024-05-09 DIAGNOSIS — F33.1 MODERATE EPISODE OF RECURRENT MAJOR DEPRESSIVE DISORDER (H): ICD-10-CM

## 2024-05-09 DIAGNOSIS — G47.30 SLEEP APNEA, UNSPECIFIED TYPE: ICD-10-CM

## 2024-05-09 DIAGNOSIS — Z87.19 HISTORY OF GI BLEED: ICD-10-CM

## 2024-05-09 DIAGNOSIS — M15.0 PRIMARY OSTEOARTHRITIS INVOLVING MULTIPLE JOINTS: ICD-10-CM

## 2024-05-09 DIAGNOSIS — E66.01 MORBID OBESITY (H): ICD-10-CM

## 2024-05-09 DIAGNOSIS — K31.89 EROSIVE GASTROPATHY: ICD-10-CM

## 2024-05-09 DIAGNOSIS — K44.9 HIATAL HERNIA: ICD-10-CM

## 2024-05-09 DIAGNOSIS — R60.0 BILATERAL LOWER EXTREMITY EDEMA: ICD-10-CM

## 2024-05-09 DIAGNOSIS — D50.0 IRON DEFICIENCY ANEMIA DUE TO CHRONIC BLOOD LOSS: ICD-10-CM

## 2024-05-09 LAB
ERYTHROCYTE [DISTWIDTH] IN BLOOD BY AUTOMATED COUNT: 17.2 % (ref 10–15)
HCT VFR BLD AUTO: 36.7 % (ref 35–47)
HGB BLD-MCNC: 10.6 G/DL (ref 11.7–15.7)
MCH RBC QN AUTO: 25 PG (ref 26.5–33)
MCHC RBC AUTO-ENTMCNC: 28.9 G/DL (ref 31.5–36.5)
MCV RBC AUTO: 87 FL (ref 78–100)
PLATELET # BLD AUTO: 419 10E3/UL (ref 150–450)
RBC # BLD AUTO: 4.24 10E6/UL (ref 3.8–5.2)
WBC # BLD AUTO: 12.5 10E3/UL (ref 4–11)

## 2024-05-09 PROCEDURE — 85027 COMPLETE CBC AUTOMATED: CPT | Performed by: PEDIATRICS

## 2024-05-09 PROCEDURE — 36415 COLL VENOUS BLD VENIPUNCTURE: CPT | Performed by: PEDIATRICS

## 2024-05-09 PROCEDURE — G0402 INITIAL PREVENTIVE EXAM: HCPCS | Performed by: PEDIATRICS

## 2024-05-09 PROCEDURE — 99214 OFFICE O/P EST MOD 30 MIN: CPT | Mod: 25 | Performed by: PEDIATRICS

## 2024-05-09 RX ORDER — DIPHENHYDRAMINE HYDROCHLORIDE 50 MG/ML
50 INJECTION INTRAMUSCULAR; INTRAVENOUS
Status: CANCELLED
Start: 2024-05-16

## 2024-05-09 RX ORDER — MEPERIDINE HYDROCHLORIDE 25 MG/ML
25 INJECTION INTRAMUSCULAR; INTRAVENOUS; SUBCUTANEOUS EVERY 30 MIN PRN
Status: CANCELLED | OUTPATIENT
Start: 2024-05-16

## 2024-05-09 RX ORDER — EPINEPHRINE 1 MG/ML
0.3 INJECTION, SOLUTION, CONCENTRATE INTRAVENOUS EVERY 5 MIN PRN
Status: CANCELLED | OUTPATIENT
Start: 2024-05-16

## 2024-05-09 RX ORDER — METHYLPREDNISOLONE SODIUM SUCCINATE 125 MG/2ML
125 INJECTION, POWDER, LYOPHILIZED, FOR SOLUTION INTRAMUSCULAR; INTRAVENOUS
Status: CANCELLED
Start: 2024-05-16

## 2024-05-09 RX ORDER — HEPARIN SODIUM (PORCINE) LOCK FLUSH IV SOLN 100 UNIT/ML 100 UNIT/ML
5 SOLUTION INTRAVENOUS
Status: CANCELLED | OUTPATIENT
Start: 2024-05-16

## 2024-05-09 RX ORDER — ALBUTEROL SULFATE 90 UG/1
1-2 AEROSOL, METERED RESPIRATORY (INHALATION)
Status: CANCELLED
Start: 2024-05-16

## 2024-05-09 RX ORDER — ALBUTEROL SULFATE 0.83 MG/ML
2.5 SOLUTION RESPIRATORY (INHALATION)
Status: CANCELLED | OUTPATIENT
Start: 2024-05-16

## 2024-05-09 RX ORDER — HEPARIN SODIUM,PORCINE 10 UNIT/ML
5-20 VIAL (ML) INTRAVENOUS DAILY PRN
Status: CANCELLED | OUTPATIENT
Start: 2024-05-16

## 2024-05-09 SDOH — HEALTH STABILITY: PHYSICAL HEALTH: ON AVERAGE, HOW MANY DAYS PER WEEK DO YOU ENGAGE IN MODERATE TO STRENUOUS EXERCISE (LIKE A BRISK WALK)?: 1 DAY

## 2024-05-09 ASSESSMENT — PATIENT HEALTH QUESTIONNAIRE - PHQ9
SUM OF ALL RESPONSES TO PHQ QUESTIONS 1-9: 5
10. IF YOU CHECKED OFF ANY PROBLEMS, HOW DIFFICULT HAVE THESE PROBLEMS MADE IT FOR YOU TO DO YOUR WORK, TAKE CARE OF THINGS AT HOME, OR GET ALONG WITH OTHER PEOPLE: SOMEWHAT DIFFICULT
SUM OF ALL RESPONSES TO PHQ QUESTIONS 1-9: 5

## 2024-05-09 ASSESSMENT — SOCIAL DETERMINANTS OF HEALTH (SDOH): HOW OFTEN DO YOU GET TOGETHER WITH FRIENDS OR RELATIVES?: ONCE A WEEK

## 2024-05-09 ASSESSMENT — PAIN SCALES - GENERAL: PAINLEVEL: MILD PAIN (2)

## 2024-05-09 NOTE — PATIENT INSTRUCTIONS
Expect a phone call to set up a visit for 3 iron infusions    Let's repeat labs today and then again in 1 month after your infusions    Have a blast on your road trip    RSV vaccine - get in the pharmacy    I'll stay tuned about your scope

## 2024-05-09 NOTE — PROGRESS NOTES
Preventive Care Visit  [facility], Internal Medicine - Pediatrics  May 9, 2024      Assessment & Plan       ICD-10-CM    1. Medicare annual wellness visit, initial  Z00.00 Reviewed recent lab work      2. Moderate episode of recurrent major depressive disorder (H)  F33.1 Mood stable on current medications, continue      3. JAROD (generalized anxiety disorder)  F41.1 Stable on current medications, continue      4. Erosive gastropathy  K31.89 CBC with platelets    Concern about recurrent bleeding - patient has EGD scheduled later this month.  Plan repeat CBC today.      5. Hiatal hernia  K44.9       6. Sleep apnea, unspecified type  G47.30 Patient continues to consider being seen by sleep medicine - she will keep me posted      7. Iron deficiency anemia due to chronic blood loss  D50.0 CBC with platelets    Has not been tolerating oral iron due to GI side effects - plan trial of iron infusions      8. Morbid obesity (H)  E66.01 Complicates management of lymphedema sleep apnea.   Will continue to follow weight trends.      9. Primary osteoarthritis involving multiple joints  M15.9 Working with orthopedics      10. History of GI bleed  Z87.19       11. Bilateral lower extremity edema  R60.0 Greatly appreciate care from PHYSICAL THERAPY in lymphedema therapy - patient has had great response to treatment and wraps                Counseling  Appropriate preventive services were discussed with this patient        Kelly Coronel is a 63 year old, presenting for the following:  Physical        5/9/2024     1:34 PM   Additional Questions   Roomed by Diana MARTIN   Accompanied by Self         5/9/2024     1:34 PM   Patient Reported Additional Medications   Patient reports taking the following new medications No        Health Care Directive  Patient does not have a Health Care Directive or Living Will: Discussed advance care planning with patient; information given to patient to  review.    HPI        5/9/2024   General Health   How would you rate your overall physical health? (!) FAIR   Feel stress (tense, anxious, or unable to sleep) To some extent   (!) STRESS CONCERN      5/9/2024   Nutrition   Diet: Regular (no restrictions)         5/9/2024   Exercise   Days per week of moderate/strenous exercise 1 day   (!) EXERCISE CONCERN      5/9/2024   Social Factors   Frequency of gathering with friends or relatives Once a week   Worry food won't last until get money to buy more No   Food not last or not have enough money for food? No   Do you have housing?  Yes   Are you worried about losing your housing? No   Lack of transportation? No   Unable to get utilities (heat,electricity)? No         5/9/2024   Fall Risk   Fallen 2 or more times in the past year? No   Trouble with walking or balance? Yes   Gait Speed Test (Document in seconds) 3   Gait Speed Test Interpretation Less than or equal to 5.00 seconds - PASS          5/9/2024   Activities of Daily Living- Home Safety   Needs help with the following daily activites None of the above   Safety concerns in the home No grab bars in the bathroom         5/9/2024   Dental   Dentist two times every year? Yes         5/9/2024   Hearing Screening   Hearing concerns? None of the above         5/9/2024   Driving Risk Screening   Patient/family members have concerns about driving No         5/9/2024   General Alertness/Fatigue Screening   Have you been more tired than usual lately? (!) YES         5/9/2024   Urinary Incontinence Screening   Bothered by leaking urine in past 6 months Yes         5/9/2024   TB Screening   Were you born outside of the [place]? No       Today's PHQ-9 Score:       5/9/2024     1:18 PM   PHQ-9 SCORE   PHQ-9 Total Score MyChart 5 (Mild depression)   PHQ-9 Total Score 5         3/7/2023    12:14 AM 11/13/2023     3:48 PM 5/9/2024     1:18 PM   PHQ   PHQ-9 Total Score 5 5 5   Q9: Thoughts of better off dead/self-harm past 2 weeks Not  at all Not at all Not at all           5/9/2024   Substance Use   Alcohol more than 3/day or more than 7/wk No   Do you have a current opioid prescription? No   How severe/bad is pain from 1 to 10? 4/10   Do you use any other substances recreationally? No     Social History     Tobacco Use    Smoking status: Never     Passive exposure: Yes    Smokeless tobacco: Never    Tobacco comments:     Live with a smoker   Vaping Use    Vaping status: Never Used   Substance Use Topics    Alcohol use: Yes     Comment: occasional , social    Drug use: No           5/9/2024   Breast Cancer Screening   Family history of breast, colon, or ovarian cancer? No / Unknown         9/14/2021   LAST FHS-7 RESULTS   1st degree relative breast or ovarian cancer No   Any relative bilateral breast cancer No   Any male have breast cancer No   Any ONE woman have BOTH breast AND ovarian cancer No   Any woman with breast cancer before 50yrs No   2 or more relatives with breast AND/OR ovarian cancer No   2 or more relatives with breast AND/OR bowel cancer No       Mammogram Screening - Mammogram every 1-2 years updated in Health Maintenance based on mutual decision making      History of abnormal Pap smear: Status post benign hysterectomy. Health Maintenance and Surgical History updated.       ASCVD Risk   The 10-year ASCVD risk score (Marianna GARCÍA, et al., 2019) is: 3.1%    Values used to calculate the score:      Age: 63 years      Sex: Female      Is Non- : No      Diabetic: No      Tobacco smoker: No      Systolic Blood Pressure: 113 mmHg      Is BP treated: No      HDL Cholesterol: 100 mg/dL      Total Cholesterol: 210 mg/dL        Reviewed and updated as needed this visit by Provider                      Current providers sharing in care for this patient include:  Patient Care Team:  Bing Fung MD as PCP - General (Internal Medicine)  Bing Fung MD as Assigned PCP  Jeana Dawkins RN as  "Registered Nurse    The following health maintenance items are reviewed in Epic and correct as of today:  Health Maintenance   Topic Date Due    ANNUAL REVIEW OF HM ORDERS  Never done    DEPRESSION ACTION PLAN  Never done    RSV VACCINE (Pregnancy & 60+) (1 - 1-dose 60+ series) Never done    PHQ-9  11/09/2024    MEDICARE ANNUAL WELLNESS VISIT  11/13/2024    MAMMO SCREENING  11/28/2025    GLUCOSE  05/02/2027    LIPID  05/02/2029    ADVANCE CARE PLANNING  05/09/2029    COLORECTAL CANCER SCREENING  07/16/2029    DTAP/TDAP/TD IMMUNIZATION (3 - Td or Tdap) 07/11/2033    HEPATITIS C SCREENING  Completed    HIV SCREENING  Completed    INFLUENZA VACCINE  Completed    ZOSTER IMMUNIZATION  Completed    COVID-19 Vaccine  Completed    Pneumococcal Vaccine: Pediatrics (0 to 5 Years) and At-Risk Patients (6 to 64 Years)  Aged Out    IPV IMMUNIZATION  Aged Out    HPV IMMUNIZATION  Aged Out    MENINGITIS IMMUNIZATION  Aged Out    RSV MONOCLONAL ANTIBODY  Aged Out    PAP  Discontinued     Knee OA - getting steroids and synvisc - trying not to use celebrex    Anemia - hx of erosive gastropathy.  Has been taking iron orally - having GI side effects.      Lymphedema - much better with lyphedema treatment -     Erosive gastritis - bleeding every few weeks - black stools a few days out of every 3 weeks.  EGD/colonoscopy scheduled later this month.      Depression/anxiety - stable on current medications    Sleep apnea - not currently being treated    Supplements:    Omega - tumeric    Mag-D    Folate    Ca/Vit D       ROS: 10 point ROS neg other than the symptoms noted above in the HPI.       Objective    Exam  /78 (BP Location: Right arm, Patient Position: Sitting, Cuff Size: Adult Large)   Pulse 84   Temp 98.3  F (36.8  C) (Tympanic)   Resp 14   Ht 1.646 m (5' 4.8\")   Wt 120 kg (264 lb 8 oz)   SpO2 97%   BMI 44.28 kg/m     Estimated body mass index is 44.28 kg/m  as calculated from the following:    Height as of this " "encounter: 1.646 m (5' 4.8\").    Weight as of this encounter: 120 kg (264 lb 8 oz).  Wt Readings from Last 4 Encounters:   05/09/24 120 kg (264 lb 8 oz)   02/20/24 122.3 kg (269 lb 9.6 oz)   01/02/24 119.3 kg (263 lb)   11/13/23 119.3 kg (263 lb)         Physical Exam  GENERAL: alert and no distress  EYES: Eyes grossly normal to inspection, PERRL and conjunctivae and sclerae normal  HENT: ear canals and TM's normal, nose and mouth without ulcers or lesions  NECK: no adenopathy, no asymmetry, masses, or scars  RESP: lungs clear to auscultation - no rales, rhonchi or wheezes  CV: regular rate and rhythm, normal S1 S2, no S3 or S4, no murmur, click or rub, no peripheral edema  ABDOMEN: soft, nontender, no hepatosplenomegaly, no masses and bowel sounds normal  MS: no gross musculoskeletal defects noted, much improved lymphedema - compression stockings in place  SKIN: no suspicious lesions or rashes  NEURO: Normal strength and tone, mentation intact and speech normal  PSYCH: mentation appears normal, affect normal/bright        5/9/2024   Mini Cog   Clock Draw Score 2 Normal   3 Item Recall 3 objects recalled   Mini Cog Total Score 5         Vision Screen  Patient wears corrective lenses (select all that apply): (!) Worn during vision screen;Prescription older than 1 year  Vision Screen Results: Pass      Signed Electronically by: Bing Fung MD    "

## 2024-05-15 ENCOUNTER — THERAPY VISIT (OUTPATIENT)
Dept: PHYSICAL THERAPY | Facility: CLINIC | Age: 64
End: 2024-05-15
Payer: COMMERCIAL

## 2024-05-15 DIAGNOSIS — L90.5 SCAR CONDITION AND FIBROSIS OF SKIN: ICD-10-CM

## 2024-05-15 DIAGNOSIS — I89.0 LYMPHEDEMA OF BOTH LOWER EXTREMITIES: ICD-10-CM

## 2024-05-15 DIAGNOSIS — R52 PAIN: ICD-10-CM

## 2024-05-15 DIAGNOSIS — R60.0 BILATERAL LOWER EXTREMITY EDEMA: Primary | ICD-10-CM

## 2024-05-15 PROCEDURE — 97140 MANUAL THERAPY 1/> REGIONS: CPT | Mod: GP | Performed by: PHYSICAL THERAPIST

## 2024-05-15 PROCEDURE — 97110 THERAPEUTIC EXERCISES: CPT | Mod: GP | Performed by: PHYSICAL THERAPIST

## 2024-05-15 NOTE — PATIENT INSTRUCTIONS
LYMPHEDEMA and EXERCISE HOME PROGRAM        1) Wear your compression garments daily (custom knee highs paired with Bioflect leggings) removing at night; to limit edema refill. Laundering as directed.    2) Perform bandaging of your lower legs as directed by your therapist; consider at least 1x/week option to leave on 1-2 days as long as feeling ok and looking ok to reduce edema accumulating with garments alone.    3) Occasionally assess your legs for edema before and after use of garments/bandaging to assist in assessing effectiveness or need for modifications.    4)  Perform Edema/ Strengthening (2-3x/week) / Aerobic (ie swimming, walking, Nustep) goal of 150'/week) exercises as directed. Insufficient activity is a risk factor for lymphedema.      5)  Self Lymphatic drainage massage as directed by your therapist. Ie 1x/before bed x 5'     6) Continue to follow good skin care practices and precautions.    7)  Compression garments on average have a 3-6 month life expectancy depending on type of garment (ready made vs custom) and their use and wear.  Replace your day and night compression garments based on signs of wear (such as excessive pilling, running, snags, or holes), loss of elasticity and compression, or improper fit (too tight or too loose).   A.  If your garment is still fitting well:  reorder the same type and size garment.  B.  If your garment is not fitting properly (because of change in arm / leg size): call your doctor to assure no further medical intervention indicated then consider a referral to see your edema therapist for a recheck.  C.  You may need to be re-measured for a new compression garment if you have had a significant change in your weight (greater than 15-20lbs).  D.  A new onset of other medical conditions or challenges in using current garments may indicate the need for a new compression garment.    8)  Replace your bandages every 12 months or sooner if you notice loss of elasticity or  excessive wear and tear despite proper care and laundering.    9)  Contact your physician with any worsening of edema not controlled with current home program, signs/symptoms of infection (redness, pain, warmth, fever, increased swelling) or increased challenges with current recommended home program.    10)  You should consider contact your edema therapist with any questions/concerns regarding your edema/ home program. You may need to contact your doctor first to get a new order if treatment indicated.

## 2024-05-18 ENCOUNTER — TELEPHONE (OUTPATIENT)
Dept: GASTROENTEROLOGY | Facility: CLINIC | Age: 64
End: 2024-05-18

## 2024-05-18 NOTE — TELEPHONE ENCOUNTER
Pre visit planning completed.      Procedure details:    Patient scheduled for Upper endoscopy (EGD) on 05.30.2024.     Arrival time: 1215. Procedure time 1300    Facility location: [facility]; [address] [place] [address]. Check in location: Main entrance at . Come through the roundabout underneath the awning (not the ER entrance).    Sedation type: Conscious sedation     Pre op exam needed? N/A    Indication for procedure:    History of GI bleed   Erosive gastropathy         Chart review:     Electronic implanted devices? No    Recent diagnosis of diverticulitis within the last 6 weeks? No    Diabetic? No      Medication review:    Anticoagulants? No    NSAIDS? No NSAID medications per patient's medication list.  RN will verify with pre-assessment call.    Other medication HOLDING recommendations:  N/A      Prep for procedure:     Prep instructions sent via [employer] Travel[employer]ta Rivera RN  Endoscopy Procedure Pre Assessment RN  [phone] option 4

## 2024-05-20 NOTE — TELEPHONE ENCOUNTER
Pre assessment completed for upcoming procedure.   (Please see previous telephone encounter notes for complete details)        Procedure details:    Arrival time and facility location reviewed.    Pre op exam needed? N/A    Designated  policy reviewed. Instructed to have someone stay 6  hours post procedure.       Medication review:    Medications reviewed. Please see supporting documentation below. Holding recommendations discussed (if applicable).   NSAID medication(s): Celecoxib (Celebrex): HOLD 3 days before procedure       Prep for procedure:     Procedure prep instructions reviewed.        Any additional information needed:  N/A      Patient  verbalized understanding and had no questions or concerns at this time.      Ilana Rivera RN  Endoscopy Procedure Pre Assessment RN  [phone] option 4

## 2024-05-21 ENCOUNTER — TRANSFERRED RECORDS (OUTPATIENT)
Dept: HEALTH INFORMATION MANAGEMENT | Facility: CLINIC | Age: 64
End: 2024-05-21
Payer: COMMERCIAL

## 2024-05-30 ENCOUNTER — HOSPITAL ENCOUNTER (OUTPATIENT)
Facility: CLINIC | Age: 64
Discharge: HOME | End: 2024-05-30
Attending: INTERNAL MEDICINE | Admitting: INTERNAL MEDICINE
Payer: COMMERCIAL

## 2024-05-30 VITALS
DIASTOLIC BLOOD PRESSURE: 69 MMHG | HEART RATE: 86 BPM | BODY MASS INDEX: 43.99 KG/M2 | SYSTOLIC BLOOD PRESSURE: 100 MMHG | WEIGHT: 264 LBS | HEIGHT: 65 IN | RESPIRATION RATE: 16 BRPM | OXYGEN SATURATION: 94 %

## 2024-05-30 LAB — UPPER GI ENDOSCOPY: NORMAL

## 2024-05-30 PROCEDURE — 999N000099 HC STATISTIC MODERATE SEDATION < 10 MIN: Performed by: INTERNAL MEDICINE

## 2024-05-30 PROCEDURE — 250N000011 HC RX IP 250 OP 636: Performed by: INTERNAL MEDICINE

## 2024-05-30 PROCEDURE — 250N000009 HC RX 250: Performed by: INTERNAL MEDICINE

## 2024-05-30 PROCEDURE — 43235 EGD DIAGNOSTIC BRUSH WASH: CPT | Performed by: INTERNAL MEDICINE

## 2024-05-30 RX ORDER — FLUMAZENIL 0.1 MG/ML
0.2 INJECTION, SOLUTION INTRAVENOUS
Status: DISCONTINUED | OUTPATIENT
Start: 2024-05-30 | End: 2024-05-30 | Stop reason: HOSPADM

## 2024-05-30 RX ORDER — CELECOXIB 100 MG/1
100 CAPSULE ORAL 2 TIMES DAILY
COMMUNITY

## 2024-05-30 RX ORDER — LIDOCAINE 40 MG/G
CREAM TOPICAL
Status: DISCONTINUED | OUTPATIENT
Start: 2024-05-30 | End: 2024-05-30 | Stop reason: HOSPADM

## 2024-05-30 RX ORDER — NALOXONE HYDROCHLORIDE 0.4 MG/ML
0.4 INJECTION, SOLUTION INTRAMUSCULAR; INTRAVENOUS; SUBCUTANEOUS
Status: DISCONTINUED | OUTPATIENT
Start: 2024-05-30 | End: 2024-05-30 | Stop reason: HOSPADM

## 2024-05-30 RX ORDER — SIMETHICONE 40MG/0.6ML
133 SUSPENSION, DROPS(FINAL DOSAGE FORM)(ML) ORAL
Status: DISCONTINUED | OUTPATIENT
Start: 2024-05-30 | End: 2024-05-30 | Stop reason: HOSPADM

## 2024-05-30 RX ORDER — LANOLIN ALCOHOL/MO/W.PET/CERES
400 CREAM (GRAM) TOPICAL DAILY
COMMUNITY

## 2024-05-30 RX ORDER — EPINEPHRINE 1 MG/ML
0.1 INJECTION, SOLUTION INTRAMUSCULAR; SUBCUTANEOUS
Status: DISCONTINUED | OUTPATIENT
Start: 2024-05-30 | End: 2024-05-30 | Stop reason: HOSPADM

## 2024-05-30 RX ORDER — ONDANSETRON 2 MG/ML
4 INJECTION INTRAMUSCULAR; INTRAVENOUS EVERY 6 HOURS PRN
Status: DISCONTINUED | OUTPATIENT
Start: 2024-05-30 | End: 2024-05-30 | Stop reason: HOSPADM

## 2024-05-30 RX ORDER — ONDANSETRON 4 MG/1
4 TABLET, ORALLY DISINTEGRATING ORAL EVERY 6 HOURS PRN
Status: DISCONTINUED | OUTPATIENT
Start: 2024-05-30 | End: 2024-05-30 | Stop reason: HOSPADM

## 2024-05-30 RX ORDER — OMEGA-3/DHA/EPA/FISH OIL 60 MG-90MG
500 CAPSULE ORAL DAILY
COMMUNITY

## 2024-05-30 RX ORDER — ATROPINE SULFATE 0.1 MG/ML
1 INJECTION INTRAVENOUS
Status: DISCONTINUED | OUTPATIENT
Start: 2024-05-30 | End: 2024-05-30 | Stop reason: HOSPADM

## 2024-05-30 RX ORDER — ONDANSETRON 2 MG/ML
4 INJECTION INTRAMUSCULAR; INTRAVENOUS
Status: DISCONTINUED | OUTPATIENT
Start: 2024-05-30 | End: 2024-05-30 | Stop reason: HOSPADM

## 2024-05-30 RX ORDER — PROCHLORPERAZINE MALEATE 5 MG
5 TABLET ORAL EVERY 6 HOURS PRN
Status: DISCONTINUED | OUTPATIENT
Start: 2024-05-30 | End: 2024-05-30 | Stop reason: HOSPADM

## 2024-05-30 RX ORDER — NALOXONE HYDROCHLORIDE 0.4 MG/ML
0.2 INJECTION, SOLUTION INTRAMUSCULAR; INTRAVENOUS; SUBCUTANEOUS
Status: DISCONTINUED | OUTPATIENT
Start: 2024-05-30 | End: 2024-05-30 | Stop reason: HOSPADM

## 2024-05-30 RX ORDER — DIPHENHYDRAMINE HYDROCHLORIDE 50 MG/ML
25-50 INJECTION INTRAMUSCULAR; INTRAVENOUS
Status: DISCONTINUED | OUTPATIENT
Start: 2024-05-30 | End: 2024-05-30 | Stop reason: HOSPADM

## 2024-05-30 RX ORDER — FENTANYL CITRATE 50 UG/ML
50-100 INJECTION, SOLUTION INTRAMUSCULAR; INTRAVENOUS EVERY 5 MIN PRN
Status: DISCONTINUED | OUTPATIENT
Start: 2024-05-30 | End: 2024-05-30 | Stop reason: HOSPADM

## 2024-05-30 RX ADMIN — FENTANYL CITRATE 100 MCG: 50 INJECTION, SOLUTION INTRAMUSCULAR; INTRAVENOUS at 13:02

## 2024-05-30 RX ADMIN — MIDAZOLAM 2 MG: 1 INJECTION INTRAMUSCULAR; INTRAVENOUS at 13:02

## 2024-05-30 RX ADMIN — TOPICAL ANESTHETIC 0.5 ML: 200 SPRAY DENTAL; PERIODONTAL at 13:03

## 2024-05-30 ASSESSMENT — ACTIVITIES OF DAILY LIVING (ADL)
ADLS_ACUITY_SCORE: 37
ADLS_ACUITY_SCORE: 37

## 2024-05-30 NOTE — H&P
Pre-Endoscopy History and Physical     Berna Lee MRN# [id number]   YOB: 1960 Age: 64 year old     Date of Procedure: 5/30/2024  Primary care provider: Bing Fung  Type of Endoscopy: Gastroscopy with possible biopsy, possible dilation  Reason for Procedure: refliux  Type of Anesthesia Anticipated: Conscious Sedation    HPI:    Berna is a 64 year old female who will be undergoing the above procedure.      A history and physical has been performed. The patient's medications and allergies have been reviewed. The risks and benefits of the procedure and the sedation options and risks were discussed with the patient.  All questions were answered and informed consent was obtained.      She denies a personal or family history of anesthesia complications or bleeding disorders.     Patient Active Problem List   Diagnosis    Family history of thyroid disorder    History of dysplastic nevus    Nephrolithiasis    Family history of diabetes mellitus    Morbid obesity (H)    Ureterolithiasis    Blood loss anemia    Major depression, recurrent (H24)    Rheumatoid arthritis (H)    Iron deficiency anemia due to chronic blood loss        Past Medical History:   Diagnosis Date    Allergic state     Depressive disorder 2003 - 2004    treated with Celexa    History of blood transfusion     prior to hyst    Kidney stones 1989, 2007,1998,2003,2007    Mumps     Nephrolithiasis 3/2/2015    PONV (postoperative nausea and vomiting)     n/v postop    Rheumatoid arthritis (H) 12/27/2021    UGIB (upper gastrointestinal bleed) 9/2/2021        Past Surgical History:   Procedure Laterality Date    ABDOMEN SURGERY  02/1996    Abdominal Hysterectomy    AS RAD RESEC TONSIL/PILLARS  1964    BIOPSY  07/2013    mole on back    COLONOSCOPY  08/02/2013    Dr. Sow Counts include 234 beds at the [facility]    COLONOSCOPY  08/02/2013    COLONOSCOPY  07/16/2019    Dr. Sow Counts include 234 beds at the [facility]    COLONOSCOPY N/A 07/16/2019    Procedure: COLONOSCOPY;  Surgeon: Juanjose  Nikita DOWNEY MD;  Location:  GI    COMBINED CYSTOSCOPY, RETROGRADES, URETEROSCOPY, LASER HOLMIUM LITHOTRIPSY URETER(S), INSERT STENT Right 03/03/2015    Procedure: COMBINED CYSTOSCOPY, RETROGRADES, URETEROSCOPY, LASER HOLMIUM LITHOTRIPSY URETER(S), INSERT STENT;  Surgeon: Carlos Samson MD;  Location:  OR    CYSTOSCOPY      ENT SURGERY  1964    T&A as a child    ESOPHAGOSCOPY, GASTROSCOPY, DUODENOSCOPY (EGD), COMBINED  07/16/2019    Dr. Sow [facility]    ESOPHAGOSCOPY, GASTROSCOPY, DUODENOSCOPY (EGD), COMBINED N/A 07/16/2019    Procedure: ESOPHAGOGASTRODUODENOSCOPY, WITH BIOPSY;  Surgeon: Nikita Sow MD;  Location:  GI    ESOPHAGOSCOPY, GASTROSCOPY, DUODENOSCOPY (EGD), COMBINED N/A 09/03/2021    Procedure: ESOPHAGOGASTRODUODENOSCOPY (EGD) fv;  Surgeon: Nneka Littlejohn MD;  Location:  GI    GENITOURINARY SURGERY  3/3/15 Dr Samson    Kidney Stone Removed - Cystoscopy  Right side    HEAD & NECK SURGERY  1975    wisdom teeth removed    HYSTERECTOMY, PAP NO LONGER INDICATED      LASER HOLMIUM LITHOTRIPSY URETER(S), INSERT STENT, COMBINED Left 06/20/2019    Procedure: 1. Cystourethroscopy 2. Left ureteroscopy with holmium laser lithotripsy and stone extraction 3. Left retrograde pyelography with interpretation of intraoperative fluoroscopic imaging 4. Left ureteral stent placement;  Surgeon: Willis Reid MD;  Location:  OR    ORTHOPEDIC SURGERY  05/2015    Broken ankle - left    SOFT TISSUE SURGERY  1982    left wrist ganglion cyst    SOFT TISSUE SURGERY  1981    bilateral ingrown toenail removal surgery       Social History     Tobacco Use    Smoking status: Never     Passive exposure: Yes    Smokeless tobacco: Never    Tobacco comments:     Live with a smoker   Substance Use Topics    Alcohol use: Yes     Comment: occasional , social       Family History   Problem Relation Age of Onset    Hypertension Mother     Arthritis Mother         rheumatoid arthritis    Thyroid Disease Mother      Osteoporosis Mother     Neurologic Disorder Mother     Genitourinary Problems Mother     Cerebrovascular Disease Mother     Anxiety Disorder Mother     Obesity Mother     Unknown/Adopted Mother         Rheumatoid Arthritis/ HypoThyroid    Other Cancer Mother         Bile Duct Cancer    Hypertension Father     Lipids Father     Coronary Artery Disease Father     Hyperlipidemia Father     Obesity Father     Depression Maternal Grandmother     Thyroid Disease Maternal Grandmother     Gastrointestinal Disease Maternal Grandmother     Osteoporosis Maternal Grandmother     Diabetes Maternal Grandfather     Genitourinary Problems Maternal Grandfather     Thyroid Disease Sister     Obesity Sister     Hypertension Sister     Hyperlipidemia Sister     Thyroid Disease Sister     Obesity Sister     Unknown/Adopted Sister         Rheumatiod Arthritis/HypoThyroid    Obesity Paternal Grandmother     Unknown/Adopted Son         Sarcoidosis/Brain    Colon Cancer No family hx of        Prior to Admission medications    Medication Sig Start Date End Date Taking? Authorizing Provider   celecoxib (CELEBREX) 100 MG capsule Take 100 mg by mouth 2 times daily   Yes Reported, Patient   citalopram (CELEXA) 40 MG tablet Take 1 tablet (40 mg) by mouth daily 2/20/24  Yes Bing Fung MD   ferrous gluconate (FERGON) 324 (38 Fe) MG tablet Take 1 tablet (324 mg) by mouth daily (with breakfast) 9/4/21  Yes Timothy Estes MD   fexofenadine (ALLEGRA) 30 MG ODT Take 30 mg by mouth daily   Yes Reported, Patient   fish oil-omega-3 fatty acids 500 MG capsule Take 500 mg by mouth daily   Yes Reported, Patient   folic acid (FOLVITE) 400 MCG tablet Take 400 mcg by mouth daily   Yes Reported, Patient   PROBIOTIC PRODUCT PO Take 1 capsule by mouth daily Provital   Yes Reported, Patient   vitamin D3 (CHOLECALCIFEROL) 50 mcg (2000 units) tablet Take 1 tablet (50 mcg) by mouth daily 11/8/21  Yes Zamzam Almeida MD       Allergies  "  Allergen Reactions    Amoxicillin-Pot Clavulanate Rash    Codeine Sulfate Rash    Ivp Dye [Contrast Dye] Rash    Sulfa Antibiotics Rash    Tetracyclines & Related Rash        REVIEW OF SYSTEMS:   5 point ROS negative except as noted above in HPI, including Gen., Resp., CV, GI &  system review.    PHYSICAL EXAM:   Ht 1.646 m (5' 4.8\")   Wt 119.7 kg (264 lb)   BMI 44.20 kg/m   Estimated body mass index is 44.2 kg/m  as calculated from the following:    Height as of this encounter: 1.646 m (5' 4.8\").    Weight as of this encounter: 119.7 kg (264 lb).   GENERAL APPEARANCE: alert, and oriented  MENTAL STATUS: alert  AIRWAY EXAM: Mallampatti Class I (visualization of the soft palate, fauces, uvula, anterior and posterior pillars)  RESP: lungs clear to auscultation - no rales, rhonchi or wheezes  CV: regular rates and rhythm  DIAGNOSTICS:    Not indicated    IMPRESSION   ASA Class 2 - Mild systemic disease    PLAN:   Plan for Gastroscopy with possible biopsy, possible dilation. We discussed the risks, benefits and alternatives and the patient wished to proceed.    The above has been forwarded to the consulting provider.      Signed Electronically by: Nikita Sow MD  May 30, 2024          "

## 2024-06-03 ENCOUNTER — INFUSION THERAPY VISIT (OUTPATIENT)
Dept: INFUSION THERAPY | Facility: CLINIC | Age: 64
End: 2024-06-03
Attending: PEDIATRICS
Payer: COMMERCIAL

## 2024-06-03 VITALS
BODY MASS INDEX: 44.65 KG/M2 | TEMPERATURE: 97.9 F | HEART RATE: 82 BPM | DIASTOLIC BLOOD PRESSURE: 83 MMHG | WEIGHT: 268 LBS | RESPIRATION RATE: 16 BRPM | SYSTOLIC BLOOD PRESSURE: 134 MMHG | HEIGHT: 65 IN

## 2024-06-03 DIAGNOSIS — D50.0 IRON DEFICIENCY ANEMIA DUE TO CHRONIC BLOOD LOSS: Primary | ICD-10-CM

## 2024-06-03 PROCEDURE — 250N000011 HC RX IP 250 OP 636: Performed by: PEDIATRICS

## 2024-06-03 PROCEDURE — 96365 THER/PROPH/DIAG IV INF INIT: CPT

## 2024-06-03 PROCEDURE — 258N000003 HC RX IP 258 OP 636: Performed by: PEDIATRICS

## 2024-06-03 RX ORDER — ALBUTEROL SULFATE 90 UG/1
1-2 AEROSOL, METERED RESPIRATORY (INHALATION)
Status: CANCELLED
Start: 2024-06-05

## 2024-06-03 RX ORDER — HEPARIN SODIUM (PORCINE) LOCK FLUSH IV SOLN 100 UNIT/ML 100 UNIT/ML
5 SOLUTION INTRAVENOUS
Status: CANCELLED | OUTPATIENT
Start: 2024-06-05

## 2024-06-03 RX ORDER — EPINEPHRINE 1 MG/ML
0.3 INJECTION, SOLUTION INTRAMUSCULAR; SUBCUTANEOUS EVERY 5 MIN PRN
Status: CANCELLED | OUTPATIENT
Start: 2024-06-05

## 2024-06-03 RX ORDER — ALBUTEROL SULFATE 0.83 MG/ML
2.5 SOLUTION RESPIRATORY (INHALATION)
Status: CANCELLED | OUTPATIENT
Start: 2024-06-05

## 2024-06-03 RX ORDER — METHYLPREDNISOLONE SODIUM SUCCINATE 125 MG/2ML
125 INJECTION, POWDER, LYOPHILIZED, FOR SOLUTION INTRAMUSCULAR; INTRAVENOUS
Status: CANCELLED
Start: 2024-06-05

## 2024-06-03 RX ORDER — DIPHENHYDRAMINE HYDROCHLORIDE 50 MG/ML
50 INJECTION INTRAMUSCULAR; INTRAVENOUS
Status: CANCELLED
Start: 2024-06-05

## 2024-06-03 RX ORDER — MEPERIDINE HYDROCHLORIDE 25 MG/ML
25 INJECTION INTRAMUSCULAR; INTRAVENOUS; SUBCUTANEOUS EVERY 30 MIN PRN
Status: CANCELLED | OUTPATIENT
Start: 2024-06-05

## 2024-06-03 RX ORDER — HEPARIN SODIUM,PORCINE 10 UNIT/ML
5-20 VIAL (ML) INTRAVENOUS DAILY PRN
Status: CANCELLED | OUTPATIENT
Start: 2024-06-05

## 2024-06-03 RX ADMIN — SODIUM CHLORIDE 250 ML: 9 INJECTION, SOLUTION INTRAVENOUS at 14:15

## 2024-06-03 RX ADMIN — IRON SUCROSE 300 MG: 20 INJECTION, SOLUTION INTRAVENOUS at 14:15

## 2024-06-03 NOTE — PROGRESS NOTES
Infusion Nursing Note:  Berna Lee presents today for first Venofer.    Patient seen by provider today: No   present during visit today: Not Applicable.    Note: Handout given and reviewed with patient. Oriented to clinic. All of patient's questions answered.      Intravenous Access:  Peripheral IV placed.    Treatment Conditions:  Not Applicable.      Post Infusion Assessment:  Patient tolerated infusion without incident.  Blood return noted pre and post infusion.  Site patent and intact, free from redness, edema or discomfort.  No evidence of extravasations.  Access discontinued per protocol.       Discharge Plan:   Discharge instructions reviewed with: Patient.  Patient and/or family verbalized understanding of discharge instructions and all questions answered.  AVS to patient via UbiCastT.  Patient will return 6/5/24 for next appointment.   Patient discharged in stable condition accompanied by: self.  Departure Mode: Ambulatory.      Kemi Moore RN

## 2024-06-05 ENCOUNTER — INFUSION THERAPY VISIT (OUTPATIENT)
Dept: INFUSION THERAPY | Facility: CLINIC | Age: 64
End: 2024-06-05
Attending: PEDIATRICS
Payer: COMMERCIAL

## 2024-06-05 VITALS
OXYGEN SATURATION: 94 % | SYSTOLIC BLOOD PRESSURE: 124 MMHG | HEART RATE: 91 BPM | DIASTOLIC BLOOD PRESSURE: 76 MMHG | RESPIRATION RATE: 16 BRPM | TEMPERATURE: 98.3 F

## 2024-06-05 DIAGNOSIS — D50.0 IRON DEFICIENCY ANEMIA DUE TO CHRONIC BLOOD LOSS: Primary | ICD-10-CM

## 2024-06-05 PROCEDURE — 258N000003 HC RX IP 258 OP 636: Performed by: PEDIATRICS

## 2024-06-05 PROCEDURE — 96365 THER/PROPH/DIAG IV INF INIT: CPT

## 2024-06-05 PROCEDURE — 250N000011 HC RX IP 250 OP 636: Performed by: PEDIATRICS

## 2024-06-05 RX ORDER — ALBUTEROL SULFATE 90 UG/1
1-2 AEROSOL, METERED RESPIRATORY (INHALATION)
Status: CANCELLED
Start: 2024-06-07

## 2024-06-05 RX ORDER — HEPARIN SODIUM (PORCINE) LOCK FLUSH IV SOLN 100 UNIT/ML 100 UNIT/ML
5 SOLUTION INTRAVENOUS
Status: CANCELLED | OUTPATIENT
Start: 2024-06-07

## 2024-06-05 RX ORDER — HEPARIN SODIUM,PORCINE 10 UNIT/ML
5-20 VIAL (ML) INTRAVENOUS DAILY PRN
Status: CANCELLED | OUTPATIENT
Start: 2024-06-07

## 2024-06-05 RX ORDER — METHYLPREDNISOLONE SODIUM SUCCINATE 125 MG/2ML
125 INJECTION, POWDER, LYOPHILIZED, FOR SOLUTION INTRAMUSCULAR; INTRAVENOUS
Status: CANCELLED
Start: 2024-06-07

## 2024-06-05 RX ORDER — EPINEPHRINE 1 MG/ML
0.3 INJECTION, SOLUTION INTRAMUSCULAR; SUBCUTANEOUS EVERY 5 MIN PRN
Status: CANCELLED | OUTPATIENT
Start: 2024-06-07

## 2024-06-05 RX ORDER — ALBUTEROL SULFATE 0.83 MG/ML
2.5 SOLUTION RESPIRATORY (INHALATION)
Status: CANCELLED | OUTPATIENT
Start: 2024-06-07

## 2024-06-05 RX ORDER — MEPERIDINE HYDROCHLORIDE 25 MG/ML
25 INJECTION INTRAMUSCULAR; INTRAVENOUS; SUBCUTANEOUS EVERY 30 MIN PRN
Status: CANCELLED | OUTPATIENT
Start: 2024-06-07

## 2024-06-05 RX ORDER — DIPHENHYDRAMINE HYDROCHLORIDE 50 MG/ML
50 INJECTION INTRAMUSCULAR; INTRAVENOUS
Status: CANCELLED
Start: 2024-06-07

## 2024-06-05 RX ADMIN — IRON SUCROSE 300 MG: 20 INJECTION, SOLUTION INTRAVENOUS at 14:32

## 2024-06-05 RX ADMIN — SODIUM CHLORIDE 250 ML: 9 INJECTION, SOLUTION INTRAVENOUS at 14:28

## 2024-06-05 NOTE — PROGRESS NOTES
Infusion Nursing Note:  Berna Lee presents today for Venofer #2 of 3.    Patient seen by provider today: No   present during visit today: Not Applicable.    Note: Berna reports she tolerated her first Venofer infusion with no issues.  She reports she is feeling well today and denies any new or concerning symptoms.    Intravenous Access:  Peripheral IV placed.    Treatment Conditions:   05/02/24 13:07 05/09/24 14:36   Ferritin 17    Iron 13 (L)    Iron Binding Capacity 400    Iron Sat Index 3 (L)    Hemoglobin 10.1 (L) 10.6 (L)     Post Infusion Assessment:  Patient tolerated infusion without incident.  Blood return noted pre and post infusion.  Site patent and intact, free from redness, edema or discomfort.  No evidence of extravasations.  Access discontinued per protocol.     Discharge Plan:   Discharge instructions reviewed with: Patient.  Patient and/or family verbalized understanding of discharge instructions and all questions answered.  AVS to patient via iVilkaHART.  Patient will return 6/7 for next appointment.   Patient discharged in stable condition accompanied by: self.  Departure Mode: Ambulatory.      Navin Lane RN

## 2024-06-07 ENCOUNTER — INFUSION THERAPY VISIT (OUTPATIENT)
Dept: INFUSION THERAPY | Facility: CLINIC | Age: 64
End: 2024-06-07
Attending: PEDIATRICS
Payer: COMMERCIAL

## 2024-06-07 VITALS
HEART RATE: 84 BPM | OXYGEN SATURATION: 95 % | SYSTOLIC BLOOD PRESSURE: 124 MMHG | TEMPERATURE: 98.1 F | RESPIRATION RATE: 16 BRPM | DIASTOLIC BLOOD PRESSURE: 79 MMHG

## 2024-06-07 DIAGNOSIS — D50.0 IRON DEFICIENCY ANEMIA DUE TO CHRONIC BLOOD LOSS: Primary | ICD-10-CM

## 2024-06-07 PROCEDURE — 96366 THER/PROPH/DIAG IV INF ADDON: CPT

## 2024-06-07 PROCEDURE — 258N000003 HC RX IP 258 OP 636: Mod: JZ | Performed by: PEDIATRICS

## 2024-06-07 PROCEDURE — 96365 THER/PROPH/DIAG IV INF INIT: CPT

## 2024-06-07 PROCEDURE — 250N000011 HC RX IP 250 OP 636: Mod: JZ | Performed by: PEDIATRICS

## 2024-06-07 RX ORDER — HEPARIN SODIUM,PORCINE 10 UNIT/ML
5-20 VIAL (ML) INTRAVENOUS DAILY PRN
OUTPATIENT
Start: 2024-06-07

## 2024-06-07 RX ORDER — ALBUTEROL SULFATE 90 UG/1
1-2 AEROSOL, METERED RESPIRATORY (INHALATION)
Start: 2024-06-07

## 2024-06-07 RX ORDER — ALBUTEROL SULFATE 0.83 MG/ML
2.5 SOLUTION RESPIRATORY (INHALATION)
OUTPATIENT
Start: 2024-06-07

## 2024-06-07 RX ORDER — METHYLPREDNISOLONE SODIUM SUCCINATE 125 MG/2ML
125 INJECTION, POWDER, LYOPHILIZED, FOR SOLUTION INTRAMUSCULAR; INTRAVENOUS
Start: 2024-06-07

## 2024-06-07 RX ORDER — EPINEPHRINE 1 MG/ML
0.3 INJECTION, SOLUTION INTRAMUSCULAR; SUBCUTANEOUS EVERY 5 MIN PRN
OUTPATIENT
Start: 2024-06-07

## 2024-06-07 RX ORDER — DIPHENHYDRAMINE HYDROCHLORIDE 50 MG/ML
50 INJECTION INTRAMUSCULAR; INTRAVENOUS
Start: 2024-06-07

## 2024-06-07 RX ORDER — MEPERIDINE HYDROCHLORIDE 25 MG/ML
25 INJECTION INTRAMUSCULAR; INTRAVENOUS; SUBCUTANEOUS EVERY 30 MIN PRN
OUTPATIENT
Start: 2024-06-07

## 2024-06-07 RX ORDER — HEPARIN SODIUM (PORCINE) LOCK FLUSH IV SOLN 100 UNIT/ML 100 UNIT/ML
5 SOLUTION INTRAVENOUS
OUTPATIENT
Start: 2024-06-07

## 2024-06-07 RX ADMIN — SODIUM CHLORIDE 250 ML: 9 INJECTION, SOLUTION INTRAVENOUS at 14:24

## 2024-06-07 RX ADMIN — IRON SUCROSE 300 MG: 20 INJECTION, SOLUTION INTRAVENOUS at 14:19

## 2024-06-07 NOTE — PROGRESS NOTES
Infusion Nursing Note:  Berna Lee presents today for #3/3 Venofer.    Patient seen by provider today: No   present during visit today: Not Applicable.    Note: N/A.    Intravenous Access:  Peripheral IV placed.    Treatment Conditions:  Not Applicable.      Post Infusion Assessment:  Patient tolerated infusion without incident.  Blood return noted pre and post infusion.  Site patent and intact, free from redness, edema or discomfort.  No evidence of extravasations.  Access discontinued per protocol.       Discharge Plan:   AVS to patient via MYCHART.  Patient will return NA for next appointment.   Patient discharged in stable condition accompanied by: self.  Departure Mode: Ambulatory.      Alix Pablo RN

## 2024-07-30 ENCOUNTER — TRANSFERRED RECORDS (OUTPATIENT)
Dept: HEALTH INFORMATION MANAGEMENT | Facility: CLINIC | Age: 64
End: 2024-07-30
Payer: COMMERCIAL

## 2024-09-05 ENCOUNTER — DOCUMENTATION ONLY (OUTPATIENT)
Dept: PEDIATRICS | Facility: CLINIC | Age: 64
End: 2024-09-05
Payer: COMMERCIAL

## 2024-09-05 DIAGNOSIS — D50.0 IRON DEFICIENCY ANEMIA DUE TO CHRONIC BLOOD LOSS: Primary | ICD-10-CM

## 2024-09-05 NOTE — PROGRESS NOTES
Berna Lee has an upcoming lab appointment:    Future Appointments   Date Time Provider Department Center   9/9/2024  1:15 PM [facility]   9[facility]16/2024  4:00 [facility]     Patient is scheduled for the following lab(s): Scheduling Notes:lab per Dr Fung check hemoglobin  Made On:  Change Notes:5/9/2024 2:29 PM  5/9/2024 2:30 PM  By:  By:  CLAUDIA GILBERT ASSEFU W      There is no order available. Please review and place either future orders or HMPO (Review of Health Maintenance Protocol Orders), as appropriate.    Health Maintenance Due   Topic    ANNUAL REVIEW OF HM ORDERS       Orders needs for lab appt if not have care team contact patient. Cherry Walsh

## 2024-09-09 ENCOUNTER — LAB (OUTPATIENT)
Dept: LAB | Facility: CLINIC | Age: 64
End: 2024-09-09
Payer: COMMERCIAL

## 2024-09-09 DIAGNOSIS — D50.0 IRON DEFICIENCY ANEMIA DUE TO CHRONIC BLOOD LOSS: ICD-10-CM

## 2024-09-09 LAB
ERYTHROCYTE [DISTWIDTH] IN BLOOD BY AUTOMATED COUNT: 15.4 % (ref 10–15)
HCT VFR BLD AUTO: 41.7 % (ref 35–47)
HGB BLD-MCNC: 12.9 G/DL (ref 11.7–15.7)
MCH RBC QN AUTO: 28.4 PG (ref 26.5–33)
MCHC RBC AUTO-ENTMCNC: 30.9 G/DL (ref 31.5–36.5)
MCV RBC AUTO: 92 FL (ref 78–100)
PLATELET # BLD AUTO: 296 10E3/UL (ref 150–450)
RBC # BLD AUTO: 4.54 10E6/UL (ref 3.8–5.2)
WBC # BLD AUTO: 8.8 10E3/UL (ref 4–11)

## 2024-09-09 PROCEDURE — 82728 ASSAY OF FERRITIN: CPT

## 2024-09-09 PROCEDURE — 36415 COLL VENOUS BLD VENIPUNCTURE: CPT

## 2024-09-09 PROCEDURE — 85027 COMPLETE CBC AUTOMATED: CPT

## 2024-09-10 LAB — FERRITIN SERPL-MCNC: 27 NG/ML (ref 11–328)

## 2024-09-16 ENCOUNTER — OFFICE VISIT (OUTPATIENT)
Dept: PEDIATRICS | Facility: CLINIC | Age: 64
End: 2024-09-16
Payer: COMMERCIAL

## 2024-09-16 VITALS
SYSTOLIC BLOOD PRESSURE: 115 MMHG | DIASTOLIC BLOOD PRESSURE: 76 MMHG | RESPIRATION RATE: 20 BRPM | HEIGHT: 65 IN | TEMPERATURE: 97.1 F | HEART RATE: 84 BPM | WEIGHT: 269.4 LBS | OXYGEN SATURATION: 95 % | BODY MASS INDEX: 44.89 KG/M2

## 2024-09-16 DIAGNOSIS — F33.1 MODERATE EPISODE OF RECURRENT MAJOR DEPRESSIVE DISORDER (H): ICD-10-CM

## 2024-09-16 DIAGNOSIS — D50.0 IRON DEFICIENCY ANEMIA DUE TO CHRONIC BLOOD LOSS: Primary | ICD-10-CM

## 2024-09-16 DIAGNOSIS — Z23 NEED FOR VACCINATION AGAINST STREPTOCOCCUS PNEUMONIAE: ICD-10-CM

## 2024-09-16 DIAGNOSIS — F41.1 GAD (GENERALIZED ANXIETY DISORDER): ICD-10-CM

## 2024-09-16 DIAGNOSIS — M15.0 PRIMARY OSTEOARTHRITIS INVOLVING MULTIPLE JOINTS: ICD-10-CM

## 2024-09-16 DIAGNOSIS — R60.0 EDEMA OF BOTH LOWER EXTREMITIES: ICD-10-CM

## 2024-09-16 PROCEDURE — 99214 OFFICE O/P EST MOD 30 MIN: CPT | Mod: 25 | Performed by: PEDIATRICS

## 2024-09-16 PROCEDURE — G0009 ADMIN PNEUMOCOCCAL VACCINE: HCPCS | Performed by: PEDIATRICS

## 2024-09-16 PROCEDURE — 90677 PCV20 VACCINE IM: CPT | Performed by: PEDIATRICS

## 2024-09-16 ASSESSMENT — PAIN SCALES - GENERAL: PAINLEVEL: NO PAIN (0)

## 2024-09-16 NOTE — PATIENT INSTRUCTIONS
Come back for non fasting labs in about 3 months    Pneumonia vaccine today (Prevnar 20)    COVID and Flu vaccines in October    RSV vaccine in pharmacy after that

## 2024-09-16 NOTE — PROGRESS NOTES
"  Assessment & Plan       ICD-10-CM    1. Iron deficiency anemia due to chronic blood loss  D50.0 **CBC with platelets differential FUTURE 2mo     Ferritin    Hx of PUD - most recent EGD normal.  Has responded well to recent iron infusions.    CBC now normal    Plan to repeat labs in 3 months - sooner with any concern for symptoms indicating new blood loss      2 Primary OA involving multiple joints  Working with orthopedics - last steroid injection in left knee in July, on celebrex      3. Moderate episode of recurrent major depressive disorder (H) and anxiety F33.1 Well controlled, continue current medications        4. Edema of both lower extremities  R60.0 Seeing great improvement with lymphedema therapy and compression stockings      5. Need for vaccination against Streptococcus pneumoniae  Z23 PNEUMOCOCCAL 20 VALENT CONJUGATE (PREVNAR 20)                BMI  Estimated body mass index is 45.11 kg/m  as calculated from the following:    Height as of this encounter: 1.646 m (5' 4.8\").    Weight as of this encounter: 122.2 kg (269 lb 6.4 oz).   Weight management plan: Discussed healthy diet and exercise guidelines      See Patient Instructions    Kelly Coronel is a 64 year old, presenting for the following health issues:  Follow Up        9/16/2024     3:48 PM   Additional Questions   Roomed by S   Accompanied by self     History of Present Illness       Reason for visit:  Follow up for anemia    She eats 0-1 servings of fruits and vegetables daily.She consumes 2 sweetened beverage(s) daily.She exercises with enough effort to increase her heart rate 9 or less minutes per day.  She exercises with enough effort to increase her heart rate 3 or less days per week. She is missing 1 dose(s) of medications per week.  She is not taking prescribed medications regularly due to other.       Normal EGD this summer, no ulcers, hiatal hernia present.  No GERD symptoms.  Prefers to be off PPI.    Had iron infusions - last " "blood counts improved - energy slightly better    Mood - slightly better    LE edema - working with lymphedema clinic    Left knee OA - last cortisone injection in July - typically lasts 2 months            Objective    /76 (BP Location: Right arm, Patient Position: Sitting, Cuff Size: Adult Large)   Pulse 84   Temp 97.1  F (36.2  C) (Temporal)   Resp 20   Ht 1.646 m (5' 4.8\")   Wt 122.2 kg (269 lb 6.4 oz)   SpO2 95%   BMI 45.11 kg/m    Body mass index is 45.11 kg/m .  Wt Readings from Last 4 Encounters:   09/16/24 122.2 kg (269 lb 6.4 oz)   06/03/24 121.6 kg (268 lb)   05/30/24 119.7 kg (264 lb)   05/09/24 120 kg (264 lb 8 oz)       Physical Exam   GENERAL: alert and no distress  RESP: lungs clear to auscultation - no rales, rhonchi or wheezes  CV: regular rates and rhythm, normal S1 S2, no S3 or S4, no murmur, click or rub, peripheral pulses strong, and 1+ bilateral lower extremity pitting edema to ankles    ABDOMEN: nontender to palpation  PSYCH: mentation appears normal, affect normal/bright    Reviewed recent labs        Signed Electronically by: Bing Fung MD    "

## 2024-10-29 ENCOUNTER — TRANSFERRED RECORDS (OUTPATIENT)
Dept: HEALTH INFORMATION MANAGEMENT | Facility: CLINIC | Age: 64
End: 2024-10-29
Payer: COMMERCIAL

## 2024-11-26 ENCOUNTER — TRANSFERRED RECORDS (OUTPATIENT)
Dept: HEALTH INFORMATION MANAGEMENT | Facility: CLINIC | Age: 64
End: 2024-11-26
Payer: COMMERCIAL

## 2024-12-16 ENCOUNTER — LAB (OUTPATIENT)
Dept: LAB | Facility: CLINIC | Age: 64
End: 2024-12-16
Payer: COMMERCIAL

## 2024-12-16 DIAGNOSIS — D50.0 IRON DEFICIENCY ANEMIA DUE TO CHRONIC BLOOD LOSS: ICD-10-CM

## 2024-12-16 LAB
BASOPHILS # BLD AUTO: 0.1 10E3/UL (ref 0–0.2)
BASOPHILS NFR BLD AUTO: 1 %
EOSINOPHIL # BLD AUTO: 0.4 10E3/UL (ref 0–0.7)
EOSINOPHIL NFR BLD AUTO: 4 %
ERYTHROCYTE [DISTWIDTH] IN BLOOD BY AUTOMATED COUNT: 13.6 % (ref 10–15)
FERRITIN SERPL-MCNC: 20 NG/ML (ref 11–328)
HCT VFR BLD AUTO: 43.8 % (ref 35–47)
HGB BLD-MCNC: 13.4 G/DL (ref 11.7–15.7)
IMM GRANULOCYTES # BLD: 0 10E3/UL
IMM GRANULOCYTES NFR BLD: 0 %
LYMPHOCYTES # BLD AUTO: 2.2 10E3/UL (ref 0.8–5.3)
LYMPHOCYTES NFR BLD AUTO: 24 %
MCH RBC QN AUTO: 27 PG (ref 26.5–33)
MCHC RBC AUTO-ENTMCNC: 30.6 G/DL (ref 31.5–36.5)
MCV RBC AUTO: 88 FL (ref 78–100)
MONOCYTES # BLD AUTO: 1 10E3/UL (ref 0–1.3)
MONOCYTES NFR BLD AUTO: 11 %
NEUTROPHILS # BLD AUTO: 5.5 10E3/UL (ref 1.6–8.3)
NEUTROPHILS NFR BLD AUTO: 61 %
PLATELET # BLD AUTO: 329 10E3/UL (ref 150–450)
RBC # BLD AUTO: 4.96 10E6/UL (ref 3.8–5.2)
WBC # BLD AUTO: 9.1 10E3/UL (ref 4–11)

## 2024-12-16 PROCEDURE — 36415 COLL VENOUS BLD VENIPUNCTURE: CPT

## 2024-12-16 PROCEDURE — 85025 COMPLETE CBC W/AUTO DIFF WBC: CPT

## 2024-12-16 PROCEDURE — 82728 ASSAY OF FERRITIN: CPT

## 2025-01-25 NOTE — TELEPHONE ENCOUNTER
Hospital follow up    Admission date: 6/14/19    Discharge date: 6/15/19    Hospital[facility]    Reason for hospitalization: Acute Renal Colic      Discharge summary reviewed: YES    Follow-up Appointments     Follow-up and recommended labs and tests       Follow up with primary care provider, Bing Fung, within 7 days   for hospital follow- up.  Get a CBC with differential checked at that   appointment.  Follow up with urology next week.           Pt instructed to follow up with PCP in 7 days and with Urologist if indicated.   Continue to strain urine if stone not passed.   Follow-up Labs None           Medication changes: Sent home with pain medication, anti-nausea and Flomax.      Follow up needed for today: Message sent to Dr. Fung (re: clinic follow-up)      Current status: Stable. Denies pain. Lithotripsy on Thursday, 6/20    Ly Clayton RN BSN   [facility]              
Called patient and lm for her to call us back. Per Dr Fung can use same day spot see Nyla for help with scheduling it. Also holding a time on the residents schedule on 6/26 when Dr Fung is precepting. Please see Nyla with help with scheduling this.  
Dr. Fung:    Patient was wondering when you would like to see her back for hospital follow-up and repeat CBC for low Hgb. Patient reports increased fatigue, denies bleeding, lightheadedness/dizziness.     She is having a lithotripsy procedure on Thursday, 6/20.      *Triage: please call patient back on her mobile phone*      Ly Clayton RN BSN   [facility]    
ED / Discharge Outreach Protocol    Patient Contact    Attempt # 1    Was call answered?  No.  Left message on voicemail with information to call me back.    Ly Clayton RN BSN   [facility]    
Patient can follow up with me in a same day spot in the next 2 weeks.   OK to use a same day spot if needed.      Bing Fung MD  Internal Medicine - Pediatrics    
Please contact patient for In-patient follow up.  [phone] (home)     Visit date: [id number]  Diagnosis listed: At Risk For Inadequate Pain Control  Number of visits in past 12 months: 1 ED / 0 IP      
Pt called and said she is having Lithotripsy on Thursday. She did take the appt with Dr Melvin on the 26th.  
normal...

## 2025-01-28 ENCOUNTER — TRANSFERRED RECORDS (OUTPATIENT)
Dept: HEALTH INFORMATION MANAGEMENT | Facility: CLINIC | Age: 65
End: 2025-01-28
Payer: COMMERCIAL

## 2025-01-30 ENCOUNTER — MYC REFILL (OUTPATIENT)
Dept: PEDIATRICS | Facility: CLINIC | Age: 65
End: 2025-01-30
Payer: COMMERCIAL

## 2025-01-30 DIAGNOSIS — M15.0 PRIMARY OSTEOARTHRITIS INVOLVING MULTIPLE JOINTS: Primary | ICD-10-CM

## 2025-01-30 RX ORDER — CELECOXIB 100 MG/1
100 CAPSULE ORAL 2 TIMES DAILY
Qty: 60 CAPSULE | Refills: 11 | Status: SHIPPED | OUTPATIENT
Start: 2025-01-30

## 2025-04-11 ENCOUNTER — MYC MEDICAL ADVICE (OUTPATIENT)
Dept: PEDIATRICS | Facility: CLINIC | Age: 65
End: 2025-04-11
Payer: COMMERCIAL

## 2025-04-11 DIAGNOSIS — Z83.49 FAMILY HISTORY OF THYROID DISORDER: ICD-10-CM

## 2025-04-11 DIAGNOSIS — D50.0 IRON DEFICIENCY ANEMIA DUE TO CHRONIC BLOOD LOSS: ICD-10-CM

## 2025-04-11 DIAGNOSIS — Z00.00 ENCOUNTER FOR ANNUAL WELLNESS VISIT (AWV) IN MEDICARE PATIENT: Primary | ICD-10-CM

## 2025-04-11 DIAGNOSIS — D50.0 BLOOD LOSS ANEMIA: ICD-10-CM

## 2025-04-14 NOTE — TELEPHONE ENCOUNTER
Patient does not have any labs that will be due in care gaps. Would you like to order any labs so patient can discuss them at upcoming appointment?     Looks like previous annual labs having included: CBC, TSH, CMP, Lipids, Iron and bonding, Ferritin, Hem A1c.     Thanks,   Ellyn Valle RN

## 2025-04-29 ENCOUNTER — TRANSFERRED RECORDS (OUTPATIENT)
Dept: HEALTH INFORMATION MANAGEMENT | Facility: CLINIC | Age: 65
End: 2025-04-29
Payer: COMMERCIAL

## 2025-05-07 SDOH — HEALTH STABILITY: PHYSICAL HEALTH: ON AVERAGE, HOW MANY MINUTES DO YOU ENGAGE IN EXERCISE AT THIS LEVEL?: 10 MIN

## 2025-05-07 SDOH — HEALTH STABILITY: PHYSICAL HEALTH: ON AVERAGE, HOW MANY DAYS PER WEEK DO YOU ENGAGE IN MODERATE TO STRENUOUS EXERCISE (LIKE A BRISK WALK)?: 0 DAYS

## 2025-05-07 ASSESSMENT — SOCIAL DETERMINANTS OF HEALTH (SDOH): HOW OFTEN DO YOU GET TOGETHER WITH FRIENDS OR RELATIVES?: NEVER

## 2025-05-10 ENCOUNTER — LAB (OUTPATIENT)
Dept: LAB | Facility: CLINIC | Age: 65
End: 2025-05-10
Payer: COMMERCIAL

## 2025-05-10 DIAGNOSIS — Z83.49 FAMILY HISTORY OF THYROID DISORDER: ICD-10-CM

## 2025-05-10 DIAGNOSIS — Z00.00 ENCOUNTER FOR ANNUAL WELLNESS VISIT (AWV) IN MEDICARE PATIENT: ICD-10-CM

## 2025-05-10 DIAGNOSIS — D50.0 BLOOD LOSS ANEMIA: ICD-10-CM

## 2025-05-10 DIAGNOSIS — D50.0 IRON DEFICIENCY ANEMIA DUE TO CHRONIC BLOOD LOSS: ICD-10-CM

## 2025-05-10 LAB
BASOPHILS # BLD AUTO: 0.1 10E3/UL (ref 0–0.2)
BASOPHILS NFR BLD AUTO: 1 %
EOSINOPHIL # BLD AUTO: 0.3 10E3/UL (ref 0–0.7)
EOSINOPHIL NFR BLD AUTO: 3 %
ERYTHROCYTE [DISTWIDTH] IN BLOOD BY AUTOMATED COUNT: 14.9 % (ref 10–15)
EST. AVERAGE GLUCOSE BLD GHB EST-MCNC: 131 MG/DL
HBA1C MFR BLD: 6.2 % (ref 0–5.6)
HCT VFR BLD AUTO: 42.5 % (ref 35–47)
HGB BLD-MCNC: 13.4 G/DL (ref 11.7–15.7)
IMM GRANULOCYTES # BLD: 0 10E3/UL
IMM GRANULOCYTES NFR BLD: 0 %
LYMPHOCYTES # BLD AUTO: 2.5 10E3/UL (ref 0.8–5.3)
LYMPHOCYTES NFR BLD AUTO: 23 %
MCH RBC QN AUTO: 27.9 PG (ref 26.5–33)
MCHC RBC AUTO-ENTMCNC: 31.5 G/DL (ref 31.5–36.5)
MCV RBC AUTO: 89 FL (ref 78–100)
MONOCYTES # BLD AUTO: 1.2 10E3/UL (ref 0–1.3)
MONOCYTES NFR BLD AUTO: 11 %
NEUTROPHILS # BLD AUTO: 6.9 10E3/UL (ref 1.6–8.3)
NEUTROPHILS NFR BLD AUTO: 63 %
PLATELET # BLD AUTO: 364 10E3/UL (ref 150–450)
RBC # BLD AUTO: 4.8 10E6/UL (ref 3.8–5.2)
WBC # BLD AUTO: 10.9 10E3/UL (ref 4–11)

## 2025-05-10 PROCEDURE — 83540 ASSAY OF IRON: CPT

## 2025-05-10 PROCEDURE — 80061 LIPID PANEL: CPT | Mod: GZ

## 2025-05-10 PROCEDURE — 83036 HEMOGLOBIN GLYCOSYLATED A1C: CPT | Mod: GZ

## 2025-05-10 PROCEDURE — 82728 ASSAY OF FERRITIN: CPT

## 2025-05-10 PROCEDURE — 36415 COLL VENOUS BLD VENIPUNCTURE: CPT

## 2025-05-10 PROCEDURE — 84443 ASSAY THYROID STIM HORMONE: CPT | Mod: GZ

## 2025-05-10 PROCEDURE — 80053 COMPREHEN METABOLIC PANEL: CPT

## 2025-05-10 PROCEDURE — 85025 COMPLETE CBC W/AUTO DIFF WBC: CPT

## 2025-05-10 PROCEDURE — 83550 IRON BINDING TEST: CPT

## 2025-05-11 LAB
ALBUMIN SERPL BCG-MCNC: 4.1 G/DL (ref 3.5–5.2)
ALP SERPL-CCNC: 93 U/L (ref 40–150)
ALT SERPL W P-5'-P-CCNC: 26 U/L (ref 0–50)
ANION GAP SERPL CALCULATED.3IONS-SCNC: 9 MMOL/L (ref 7–15)
AST SERPL W P-5'-P-CCNC: 21 U/L (ref 0–45)
BILIRUB SERPL-MCNC: 0.4 MG/DL
BUN SERPL-MCNC: 19.2 MG/DL (ref 8–23)
CALCIUM SERPL-MCNC: 9.5 MG/DL (ref 8.8–10.4)
CHLORIDE SERPL-SCNC: 103 MMOL/L (ref 98–107)
CHOLEST SERPL-MCNC: 222 MG/DL
CREAT SERPL-MCNC: 0.73 MG/DL (ref 0.51–0.95)
EGFRCR SERPLBLD CKD-EPI 2021: >90 ML/MIN/1.73M2
FASTING STATUS PATIENT QL REPORTED: YES
FASTING STATUS PATIENT QL REPORTED: YES
FERRITIN SERPL-MCNC: 18 NG/ML (ref 11–328)
GLUCOSE SERPL-MCNC: 100 MG/DL (ref 70–99)
HCO3 SERPL-SCNC: 28 MMOL/L (ref 22–29)
HDLC SERPL-MCNC: 90 MG/DL
IRON BINDING CAPACITY (ROCHE): 267 UG/DL (ref 240–430)
IRON SATN MFR SERPL: 15 % (ref 15–46)
IRON SERPL-MCNC: 40 UG/DL (ref 37–145)
LDLC SERPL CALC-MCNC: 115 MG/DL
NONHDLC SERPL-MCNC: 132 MG/DL
POTASSIUM SERPL-SCNC: 4.9 MMOL/L (ref 3.4–5.3)
PROT SERPL-MCNC: 6.5 G/DL (ref 6.4–8.3)
SODIUM SERPL-SCNC: 140 MMOL/L (ref 135–145)
TRIGL SERPL-MCNC: 84 MG/DL
TSH SERPL DL<=0.005 MIU/L-ACNC: 2.35 UIU/ML (ref 0.3–4.2)

## 2025-05-12 ENCOUNTER — OFFICE VISIT (OUTPATIENT)
Dept: PEDIATRICS | Facility: CLINIC | Age: 65
End: 2025-05-12
Payer: COMMERCIAL

## 2025-05-12 VITALS
HEART RATE: 83 BPM | WEIGHT: 261.9 LBS | SYSTOLIC BLOOD PRESSURE: 126 MMHG | RESPIRATION RATE: 18 BRPM | TEMPERATURE: 97.8 F | DIASTOLIC BLOOD PRESSURE: 82 MMHG | HEIGHT: 65 IN | BODY MASS INDEX: 43.64 KG/M2 | OXYGEN SATURATION: 96 %

## 2025-05-12 DIAGNOSIS — Z12.31 VISIT FOR SCREENING MAMMOGRAM: ICD-10-CM

## 2025-05-12 DIAGNOSIS — R60.0 EDEMA OF BOTH LOWER EXTREMITIES: ICD-10-CM

## 2025-05-12 DIAGNOSIS — F33.1 MAJOR DEPRESSIVE DISORDER, RECURRENT EPISODE, MODERATE (H): ICD-10-CM

## 2025-05-12 DIAGNOSIS — M15.0 PRIMARY OSTEOARTHRITIS INVOLVING MULTIPLE JOINTS: ICD-10-CM

## 2025-05-12 DIAGNOSIS — E66.813 CLASS 3 OBESITY (H): ICD-10-CM

## 2025-05-12 DIAGNOSIS — F33.1 MODERATE EPISODE OF RECURRENT MAJOR DEPRESSIVE DISORDER (H): ICD-10-CM

## 2025-05-12 DIAGNOSIS — D50.0 IRON DEFICIENCY ANEMIA DUE TO CHRONIC BLOOD LOSS: ICD-10-CM

## 2025-05-12 DIAGNOSIS — R73.01 IFG (IMPAIRED FASTING GLUCOSE): ICD-10-CM

## 2025-05-12 DIAGNOSIS — Z00.00 ENCOUNTER FOR MEDICARE ANNUAL WELLNESS EXAM: Primary | ICD-10-CM

## 2025-05-12 DIAGNOSIS — Z87.19 HISTORY OF GI BLEED: ICD-10-CM

## 2025-05-12 DIAGNOSIS — R32 URINARY INCONTINENCE, UNSPECIFIED TYPE: ICD-10-CM

## 2025-05-12 DIAGNOSIS — F41.1 GAD (GENERALIZED ANXIETY DISORDER): ICD-10-CM

## 2025-05-12 DIAGNOSIS — G47.30 SLEEP APNEA, UNSPECIFIED TYPE: ICD-10-CM

## 2025-05-12 PROBLEM — M06.9 RHEUMATOID ARTHRITIS (H): Status: RESOLVED | Noted: 2021-12-27 | Resolved: 2025-05-12

## 2025-05-12 PROCEDURE — 3074F SYST BP LT 130 MM HG: CPT | Performed by: PEDIATRICS

## 2025-05-12 PROCEDURE — G2211 COMPLEX E/M VISIT ADD ON: HCPCS | Performed by: PEDIATRICS

## 2025-05-12 PROCEDURE — G0402 INITIAL PREVENTIVE EXAM: HCPCS | Performed by: PEDIATRICS

## 2025-05-12 PROCEDURE — 1125F AMNT PAIN NOTED PAIN PRSNT: CPT | Performed by: PEDIATRICS

## 2025-05-12 PROCEDURE — 99214 OFFICE O/P EST MOD 30 MIN: CPT | Mod: 25 | Performed by: PEDIATRICS

## 2025-05-12 PROCEDURE — 3079F DIAST BP 80-89 MM HG: CPT | Performed by: PEDIATRICS

## 2025-05-12 RX ORDER — CITALOPRAM HYDROBROMIDE 20 MG/1
20 TABLET ORAL DAILY
Qty: 90 TABLET | Refills: 3 | Status: SHIPPED | OUTPATIENT
Start: 2025-05-12

## 2025-05-12 ASSESSMENT — PATIENT HEALTH QUESTIONNAIRE - PHQ9
10. IF YOU CHECKED OFF ANY PROBLEMS, HOW DIFFICULT HAVE THESE PROBLEMS MADE IT FOR YOU TO DO YOUR WORK, TAKE CARE OF THINGS AT HOME, OR GET ALONG WITH OTHER PEOPLE: NOT DIFFICULT AT ALL
SUM OF ALL RESPONSES TO PHQ QUESTIONS 1-9: 6
SUM OF ALL RESPONSES TO PHQ QUESTIONS 1-9: 6

## 2025-05-12 ASSESSMENT — PAIN SCALES - GENERAL: PAINLEVEL_OUTOF10: MILD PAIN (2)

## 2025-05-12 NOTE — PROGRESS NOTES
Answers submitted by the patient for this visit:  Patient Health Questionnaire (Submitted on 5/12/2025)  If you checked off any problems, how difficult have these problems made it for you to do your work, take care of things at home, or get along with other people?: Not difficult at all  PHQ9 TOTAL SCORE: 6  Preventive Care Visit  [facility] BRENDA Fung MD, Internal Medicine - Pediatrics  May 12, 2025      Assessment & Plan       ICD-10-CM    1. Encounter for Medicare annual wellness exam  Z00.00 PRIMARY CARE FOLLOW-UP SCHEDULING    Reviewed recent labs  Mammogram scheduled      2. Class 3 obesity (H)  E66.813 With IF[facility] - plan recheck of A1C in 6 months. Hoping to avoid medication if at all possible.      3. Major depressive disorder, recurrent episode, moderate (H)  F33.1 Stopped citalopram over the winter, but may want to restart - new medication sent to pharmacy at lower dose for restarting purpsoses      4. Iron deficiency anemia due to chronic blood loss  D50.0 Most recent labs are stable - will enter future orders to recheck prior to our 6 month check in      5. History of GI bleed  Z87.19 Iron stores stable, last EGD 1 year ago.   Hemoglobin normal.  Will continue to watch closely.        6. Edema of both lower extremities  R60.0 Physical Therapy  Referral    Has benefited greatly historically from lymphedema therapy and is interested in revisiting      7. JAROD (generalized anxiety disorder)  F41.1 citalopram (CELEXA) 20 MG tablet  See above      8. Sleep apnea, unspecified type  G47.30 No acute changes, will continue to encourage treatment      9. Primary osteoarthritis involving multiple joints  M15.0 Working with orthpedics      10. Moderate episode of recurrent major depressive disorder (H)  F33.1 citalopram (CELEXA) 20 MG tablet      11. Visit for screening mammogram  Z12.31 MA Screen Bilateral w/Leonardo      12. Urinary incontinence, unspecified type  R32 Physical  "Therapy  Referral    Struggling more with mixed incontinence - recommended pelvic floor physical therapy          The longitudinal plan of care for the diagnosis(es)/condition(s) as documented were addressed during this visit. Due to the added complexity in care, I will continue to support Berna in the subsequent management and with ongoing continuity of care.         BMI  Estimated body mass index is 43.92 kg/m  as calculated from the following:    Height as of this encounter: 1.645 m (5' 4.75\").    Weight as of this encounter: 118.8 kg (261 lb 14.4 oz).   Weight management plan: Discussed healthy diet and exercise guidelines    Counseling  Appropriate preventive services were addressed with this patient via screening, questionnaire, or discussion as appropriate for fall prevention, nutrition, physical activity, Tobacco-use cessation, social engagement, weight loss and cognition.  Checklist reviewing preventive services available has been given to the patient.  Reviewed patient's diet, addressing concerns and/or questions.   Patient is at risk for social isolation and has been provided with information about the benefit of social connection.   She is at risk for psychosocial distress and has been provided with information to reduce risk.   Patient reported safety concerns were addressed today.Information on urinary incontinence and treatment options given to patient.   The patient's PHQ-9 score is consistent with mild depression. She was provided with information regarding depression.           Subjective   Berna is a 64 year old, presenting for the following:  Physical        5/12/2025     2:16 PM   Additional Questions   Roomed by cyril   Accompanied by jillian         5/12/2025     2:16 PM   Patient Reported Additional Medications   Patient reports taking the following new medications jillian         HPI    Advance Care Planning    Discussed advance care planning with patient; informed AVS has link to Honoring " Choices.        5/7/2025   General Health   How would you rate your overall physical health? Good   Feel stress (tense, anxious, or unable to sleep) Rather much   (!) STRESS CONCERN      5/7/2025   Nutrition   Diet: Regular (no restrictions)    Breakfast skipped       Multiple values from one day are sorted in reverse-chronological order         5/7/2025   Exercise   Days per week of moderate/strenous exercise 0 days   Average minutes spent exercising at this level 10 min   (!) EXERCISE CONCERN      5/7/2025   Social Factors   Frequency of gathering with friends or relatives Never   Worry food won't last until get money to buy more No   Food not last or not have enough money for food? No   Do you have housing? (Housing is defined as stable permanent housing and does not include staying outside in a car, in a tent, in an abandoned building, in an overnight shelter, or couch-surfing.) Yes   Are you worried about losing your housing? No   Lack of transportation? No   Unable to get utilities (heat,electricity)? No   (!) SOCIAL CONNECTIONS CONCERN      5/7/2025   Fall Risk   Fallen 2 or more times in the past year? No   Trouble with walking or balance? No          5/7/2025   Activities of Daily Living- Home Safety   Needs help with the following daily activites None of the above   Safety concerns in the home No grab bars in the bathroom         5/7/2025   Dental   Dentist two times every year? Yes         5/7/2025   Hearing Screening   Hearing concerns? None of the above         5/7/2025   Driving Risk Screening   Patient/family members have concerns about driving No         5/7/2025   General Alertness/Fatigue Screening   Have you been more tired than usual lately? No         5/7/2025   Urinary Incontinence Screening   Bothered by leaking urine in past 6 months Yes       Today's PHQ-9 Score:       5/12/2025     1:52 AM   PHQ-9 SCORE   PHQ-9 Total Score MyChart 6 (Mild depression)   PHQ-9 Total Score 6         Patient-reported         5/7/2025   Substance Use   Alcohol more than 3/day or more than 7/wk No   Do you have a current opioid prescription? No   How severe/bad is pain from 1 to 10? 1/10   Do you use any other substances recreationally? No     Social History     Tobacco Use    Smoking status: Passive Smoke Exposure - Never Smoker     Passive exposure: Yes    Smokeless tobacco: Never    Tobacco comments:     Live with a smoker   Vaping Use    Vaping status: Never Used   Substance Use Topics    Alcohol use: Yes     Comment: occasional , social    Drug use: No           9/14/2021   LAST FHS-7 RESULTS   1st degree relative breast or ovarian cancer No   Any relative bilateral breast cancer No   Any male have breast cancer No   Any ONE woman have BOTH breast AND ovarian cancer No   Any woman with breast cancer before 50yrs No   2 or more relatives with breast AND/OR ovarian cancer No   2 or more relatives with breast AND/OR bowel cancer No        Mammogram Screening - Mammogram every 1-2 years updated in Health Maintenance based on mutual decision making      History of abnormal Pap smear: No - age 65 or older with adequate negative prior screening test results (3 consecutive negative cytology results, 2 consecutive negative cotesting results, or 2 consecutive negative HrHPV test results within 10 years, with the most recent test occurring within the recommended screening interval for the test used)       ASCVD Risk   The 10-year ASCVD risk score (Marianna DK, et al., 2019) is: 4.6%    Values used to calculate the score:      Age: 64 years      Sex: Female      Is Non- : No      Diabetic: No      Tobacco smoker: No      Systolic Blood Pressure: 126 mmHg      Is BP treated: No      HDL Cholesterol: 90 mg/dL      Total Cholesterol: 222 mg/dL            Reviewed and updated as needed this visit by Provider                      Current providers sharing in care for this patient  "include:  Patient Care Team:  Bing Fung MD as PCP - General (Internal Medicine)  Bing Fung MD as Assigned PCP    The following health maintenance items are reviewed in Epic and correct as of today:  Health Maintenance   Topic Date Due    ANNUAL REVIEW OF  ORDERS  Never done    DEPRESSION ACTION PLAN  Never done    RSV VACCINE (1 - Risk 60-74 years 1-dose series) Never done    COVID-19 Vaccine (8 - 2024-25 season) 09/01/2024    INFLUENZA VACCINE (Season Ended) 09/01/2025    PHQ-9  11/12/2025    MAMMO SCREENING  11/28/2025    MEDICARE ANNUAL WELLNESS VISIT  05/12/2026    FALL RISK ASSESSMENT  05/12/2026    DIABETES SCREENING  05/10/2028    ADVANCE CARE PLANNING  05/09/2029    COLORECTAL CANCER SCREENING  07/16/2029    LIPID  05/10/2030    DTAP/TDAP/TD IMMUNIZATION (3 - Td or Tdap) 07/11/2033    DEXA  11/29/2036    HEPATITIS C SCREENING  Completed    HIV SCREENING  Completed    Pneumococcal Vaccine: 50+ Years  Completed    ZOSTER IMMUNIZATION  Completed    HPV IMMUNIZATION  Aged Out    MENINGITIS IMMUNIZATION  Aged Out       Depression/anxiety - doing ok.      Still on vit D supplement    Hx of GI bleed, TERENCE - last EGD was 5/2024 and was normal - recommended PPI indefinitely.  Large hiatal hernia present    Knee OA, foot OA - knees doing ok, feet bother more.  Cortisone q 3 months, synvisc q 6 months.      LE edema - working with lymphedema clinic - doing much better with compression garments     ROS: 10 point ROS neg other than the symptoms noted above in the HPI.       Objective    Exam  /82   Pulse 83   Temp 97.8  F (36.6  C) (Tympanic)   Resp 18   Ht 1.645 m (5' 4.75\")   Wt 118.8 kg (261 lb 14.4 oz)   SpO2 96%   BMI 43.92 kg/m     Estimated body mass index is 43.92 kg/m  as calculated from the following:    Height as of this encounter: 1.645 m (5' 4.75\").    Weight as of this encounter: 118.8 kg (261 lb 14.4 oz).  Wt Readings from Last 4 Encounters:   05/12/25 118.8 kg (261 lb " 14.4 oz)   09/16/24 122.2 kg (269 lb 6.4 oz)   06/03/24 121.6 kg (268 lb)   05/30/24 119.7 kg (264 lb)         Physical Exam  GENERAL: alert and no distress  EYES: Eyes grossly normal to inspection, PERRL and conjunctivae and sclerae normal  HENT: ear canals and TM's normal, nose and mouth without ulcers or lesions  NECK: no adenopathy, no asymmetry, masses, or scars  RESP: lungs clear to auscultation - no rales, rhonchi or wheezes  CV: regular rate and rhythm, normal S1 S2, no S3 or S4, no murmur, click or rub, no peripheral edema  ABDOMEN: soft, nontender, no hepatosplenomegaly, no masses and bowel sounds normal  MS: no gross musculoskeletal defects noted, no edema  SKIN: no suspicious lesions or rashes  NEURO: Normal strength and tone, mentation intact and speech normal  PSYCH: mentation appears normal, affect normal/bright        5/12/2025   Mini Cog   Clock Draw Score 2 Normal   3 Item Recall 3 objects recalled   Mini Cog Total Score 5             5/9/2024   Vision Screen   Patient wears corrective lenses (select all that apply) Worn during vision screen;Prescription older than 1 year   Vision Screen Results Pass       Signed Electronically by: Bing Fung MD

## 2025-05-12 NOTE — PATIENT INSTRUCTIONS
Please alert me if having any new stomach symptoms or signs of GI bleeding!    Keep an eye on your sugary sweets!    Ok to start back on citalopram if       Patient Education   Preventive Care Advice   This is general advice given by our system to help you stay healthy. However, your care team may have specific advice just for you. Please talk to your care team about your preventive care needs.  Nutrition  Eat 5 or more servings of fruits and vegetables each day.  Try wheat bread, brown rice and whole grain pasta (instead of white bread, rice, and pasta).  Get enough calcium and vitamin D. Check the label on foods and aim for 100% of the RDA (recommended daily allowance).  Lifestyle  Exercise at least 150 minutes each week  (30 minutes a day, 5 days a week).  Do muscle strengthening activities 2 days a week. These help control your weight and prevent disease.  No smoking.  Wear sunscreen to prevent skin cancer.  Have a dental exam and cleaning every 6 months.  Yearly exams  See your health care team every year to talk about:  Any changes in your health.  Any medicines your care team has prescribed.  Preventive care, family planning, and ways to prevent chronic diseases.  Shots (vaccines)   HPV shots (up to age 26), if you've never had them before.  Hepatitis B shots (up to age 59), if you've never had them before.  COVID-19 shot: Get this shot when it's due.  Flu shot: Get a flu shot every year.  Tetanus shot: Get a tetanus shot every 10 years.  Pneumococcal, hepatitis A, and RSV shots: Ask your care team if you need these based on your risk.  Shingles shot (for age 50 and up)  General health tests  Diabetes screening:  Starting at age 35, Get screened for diabetes at least every 3 years.  If you are younger than age 35, ask your care team if you should be screened for diabetes.  Cholesterol test: At age 39, start having a cholesterol test every 5 years, or more often if advised.  Bone density scan (DEXA): At age  50, ask your care team if you should have this scan for osteoporosis (brittle bones).  Hepatitis C: Get tested at least once in your life.  STIs (sexually transmitted infections)  Before age 24: Ask your care team if you should be screened for STIs.  After age 24: Get screened for STIs if you're at risk. You are at risk for STIs (including HIV) if:  You are sexually active with more than one person.  You don't use condoms every time.  You or a partner was diagnosed with a sexually transmitted infection.  If you are at risk for HIV, ask about PrEP medicine to prevent HIV.  Get tested for HIV at least once in your life, whether you are at risk for HIV or not.  Cancer screening tests  Cervical cancer screening: If you have a cervix, begin getting regular cervical cancer screening tests starting at age 21.  Breast cancer scan (mammogram): If you've ever had breasts, begin having regular mammograms starting at age 40. This is a scan to check for breast cancer.  Colon cancer screening: It is important to start screening for colon cancer at age 45.  Have a colonoscopy test every 10 years (or more often if you're at risk) Or, ask your provider about stool tests like a FIT test every year or Cologuard test every 3 years.  To learn more about your testing options, visit:   .  For help making a decision, visit:   https://bit.ly/mf80651.  Prostate cancer screening test: If you have a prostate, ask your care team if a prostate cancer screening test (PSA) at age 55 is right for you.  Lung cancer screening: If you are a current or former smoker ages 50 to 80, ask your care team if ongoing lung cancer screenings are right for you.  For informational purposes only. Not to replace the advice of your health care provider. Copyright   2023 [employer]. All rights reserved. Clinically reviewed by the [facility]. [employer] [id number] - REV 01/24.

## 2025-05-21 ENCOUNTER — ANCILLARY PROCEDURE (OUTPATIENT)
Dept: MAMMOGRAPHY | Facility: CLINIC | Age: 65
End: 2025-05-21
Attending: PEDIATRICS
Payer: COMMERCIAL

## 2025-05-21 DIAGNOSIS — Z12.31 VISIT FOR SCREENING MAMMOGRAM: ICD-10-CM

## 2025-05-21 PROCEDURE — 77063 BREAST TOMOSYNTHESIS BI: CPT | Mod: TC | Performed by: RADIOLOGY

## 2025-05-21 PROCEDURE — 77067 SCR MAMMO BI INCL CAD: CPT | Mod: TC | Performed by: RADIOLOGY

## 2025-05-27 ENCOUNTER — TRANSFERRED RECORDS (OUTPATIENT)
Dept: HEALTH INFORMATION MANAGEMENT | Facility: CLINIC | Age: 65
End: 2025-05-27
Payer: COMMERCIAL

## (undated) DEVICE — FLEXIVA 200

## (undated) DEVICE — KIT ENDO TURNOVER/PROCEDURE W/CLEAN A SCOPE LINERS 103888

## (undated) DEVICE — SHEATH URETERAL ACCESS NAVIGATOR HD 12/14FRX36CM M0062502250

## (undated) DEVICE — PACK CYSTO CUSTOM RIDGES

## (undated) DEVICE — BASKET NITINOL TIPLESS HALO  1.5FRX120CM 554120

## (undated) DEVICE — ENDO BITE BLOCK ADULT OLYMPUS LATEX FREE MAJ-1632

## (undated) DEVICE — RAD RX ISOVUE 300 (50ML) 61% IOPAMIDOL CHARGE PER ML

## (undated) DEVICE — LINEN FULL SHEET 5511

## (undated) DEVICE — LINEN HALF SHEET 5512

## (undated) DEVICE — SOL NACL 0.9% IRRIG 3000ML BAG 2B7477

## (undated) DEVICE — TUBING IRRIG TUR Y TYPE 96" LF 6543-01

## (undated) DEVICE — PAD CHUX UNDERPAD 23X24" 7136

## (undated) DEVICE — COVER FOOTSWITCH W/CINCH 20X24" 923267

## (undated) DEVICE — BAG CLEAR TRASH 1.3M 39X33" P4040C

## (undated) DEVICE — GLOVE PROTEXIS POWDER FREE SMT 7.5  2D72PT75X

## (undated) DEVICE — WIRE GUIDE AMPLATZ SUPER STIFF 0.035"X145CM 46-524

## (undated) DEVICE — CATH URETERAL FLEX TIP TIGERTAIL 06FRX70CM 139006

## (undated) DEVICE — GUIDEWIRE SENSOR DUAL FLEX STR 0.035"X150CM M0066703080

## (undated) DEVICE — SOL WATER IRRIG 1000ML BOTTLE 2F7114

## (undated) DEVICE — ENDO FORCEP ENDOJAW BIOPSY 2.0MMX155CM FB-221K

## (undated) RX ORDER — CEFAZOLIN SODIUM 2 G/100ML
INJECTION, SOLUTION INTRAVENOUS
Status: DISPENSED
Start: 2019-06-20

## (undated) RX ORDER — FENTANYL CITRATE 50 UG/ML
INJECTION, SOLUTION INTRAMUSCULAR; INTRAVENOUS
Status: DISPENSED
Start: 2019-06-20

## (undated) RX ORDER — ONDANSETRON 2 MG/ML
INJECTION INTRAMUSCULAR; INTRAVENOUS
Status: DISPENSED
Start: 2019-06-20

## (undated) RX ORDER — GLYCOPYRROLATE 0.2 MG/ML
INJECTION INTRAMUSCULAR; INTRAVENOUS
Status: DISPENSED
Start: 2019-06-20

## (undated) RX ORDER — SIMETHICONE 40MG/0.6ML
SUSPENSION, DROPS(FINAL DOSAGE FORM)(ML) ORAL
Status: DISPENSED
Start: 2021-09-03

## (undated) RX ORDER — PROPOFOL 10 MG/ML
INJECTION, EMULSION INTRAVENOUS
Status: DISPENSED
Start: 2019-06-20

## (undated) RX ORDER — HYDROMORPHONE HYDROCHLORIDE 1 MG/ML
INJECTION, SOLUTION INTRAMUSCULAR; INTRAVENOUS; SUBCUTANEOUS
Status: DISPENSED
Start: 2019-06-20

## (undated) RX ORDER — LIDOCAINE HYDROCHLORIDE 10 MG/ML
INJECTION, SOLUTION EPIDURAL; INFILTRATION; INTRACAUDAL; PERINEURAL
Status: DISPENSED
Start: 2019-06-20

## (undated) RX ORDER — DEXAMETHASONE SODIUM PHOSPHATE 4 MG/ML
INJECTION, SOLUTION INTRA-ARTICULAR; INTRALESIONAL; INTRAMUSCULAR; INTRAVENOUS; SOFT TISSUE
Status: DISPENSED
Start: 2019-06-20

## (undated) RX ORDER — FENTANYL CITRATE 50 UG/ML
INJECTION, SOLUTION INTRAMUSCULAR; INTRAVENOUS
Status: DISPENSED
Start: 2024-05-30

## (undated) RX ORDER — NEOSTIGMINE METHYLSULFATE 1 MG/ML
VIAL (ML) INJECTION
Status: DISPENSED
Start: 2019-06-20

## (undated) RX ORDER — FENTANYL CITRATE 50 UG/ML
INJECTION, SOLUTION INTRAMUSCULAR; INTRAVENOUS
Status: DISPENSED
Start: 2019-07-16